# Patient Record
Sex: FEMALE | Race: BLACK OR AFRICAN AMERICAN | NOT HISPANIC OR LATINO | URBAN - METROPOLITAN AREA
[De-identification: names, ages, dates, MRNs, and addresses within clinical notes are randomized per-mention and may not be internally consistent; named-entity substitution may affect disease eponyms.]

---

## 2024-06-03 ENCOUNTER — INPATIENT (INPATIENT)
Facility: HOSPITAL | Age: 39
LOS: 7 days | Discharge: HOME | End: 2024-06-11
Attending: HOSPITALIST | Admitting: HOSPITALIST
Payer: MEDICAID

## 2024-06-03 VITALS
HEART RATE: 148 BPM | DIASTOLIC BLOOD PRESSURE: 60 MMHG | OXYGEN SATURATION: 99 % | TEMPERATURE: 100 F | RESPIRATION RATE: 10 BRPM | SYSTOLIC BLOOD PRESSURE: 100 MMHG

## 2024-06-03 DIAGNOSIS — E11.10 TYPE 2 DIABETES MELLITUS WITH KETOACIDOSIS WITHOUT COMA: ICD-10-CM

## 2024-06-03 LAB
ADD ON TEST-SPECIMEN IN LAB: SIGNIFICANT CHANGE UP
ALBUMIN SERPL ELPH-MCNC: 3.6 G/DL — SIGNIFICANT CHANGE UP (ref 3.3–5)
ALBUMIN SERPL ELPH-MCNC: 4.1 G/DL — SIGNIFICANT CHANGE UP (ref 3.3–5)
ALP SERPL-CCNC: 132 U/L — HIGH (ref 40–120)
ALP SERPL-CCNC: 150 U/L — HIGH (ref 40–120)
ALT FLD-CCNC: 10 U/L — SIGNIFICANT CHANGE UP (ref 4–33)
ALT FLD-CCNC: 7 U/L — SIGNIFICANT CHANGE UP (ref 4–33)
ANION GAP SERPL CALC-SCNC: 36 MMOL/L — HIGH (ref 7–14)
ANION GAP SERPL CALC-SCNC: 38 MMOL/L — HIGH (ref 7–14)
ANION GAP SERPL CALC-SCNC: 40 MMOL/L — HIGH (ref 7–14)
APPEARANCE UR: CLEAR — SIGNIFICANT CHANGE UP
APPEARANCE UR: CLEAR — SIGNIFICANT CHANGE UP
APTT BLD: 25.2 SEC — SIGNIFICANT CHANGE UP (ref 24.5–35.6)
AST SERPL-CCNC: 10 U/L — SIGNIFICANT CHANGE UP (ref 4–32)
AST SERPL-CCNC: 21 U/L — SIGNIFICANT CHANGE UP (ref 4–32)
B-OH-BUTYR SERPL-SCNC: >9 MMOL/L — HIGH (ref 0–0.4)
BASOPHILS # BLD AUTO: 0.02 K/UL — SIGNIFICANT CHANGE UP (ref 0–0.2)
BASOPHILS # BLD AUTO: 0.02 K/UL — SIGNIFICANT CHANGE UP (ref 0–0.2)
BASOPHILS NFR BLD AUTO: 0.1 % — SIGNIFICANT CHANGE UP (ref 0–2)
BASOPHILS NFR BLD AUTO: 0.1 % — SIGNIFICANT CHANGE UP (ref 0–2)
BILIRUB SERPL-MCNC: 0.2 MG/DL — SIGNIFICANT CHANGE UP (ref 0.2–1.2)
BILIRUB SERPL-MCNC: 0.3 MG/DL — SIGNIFICANT CHANGE UP (ref 0.2–1.2)
BILIRUB UR-MCNC: NEGATIVE — SIGNIFICANT CHANGE UP
BILIRUB UR-MCNC: NEGATIVE — SIGNIFICANT CHANGE UP
BUN SERPL-MCNC: 67 MG/DL — HIGH (ref 7–23)
BUN SERPL-MCNC: 69 MG/DL — HIGH (ref 7–23)
BUN SERPL-MCNC: 74 MG/DL — HIGH (ref 7–23)
CALCIUM SERPL-MCNC: 9.2 MG/DL — SIGNIFICANT CHANGE UP (ref 8.4–10.5)
CALCIUM SERPL-MCNC: 9.3 MG/DL — SIGNIFICANT CHANGE UP (ref 8.4–10.5)
CALCIUM SERPL-MCNC: 9.9 MG/DL — SIGNIFICANT CHANGE UP (ref 8.4–10.5)
CHLORIDE SERPL-SCNC: 105 MMOL/L — SIGNIFICANT CHANGE UP (ref 98–107)
CHLORIDE SERPL-SCNC: 90 MMOL/L — LOW (ref 98–107)
CHLORIDE SERPL-SCNC: 99 MMOL/L — SIGNIFICANT CHANGE UP (ref 98–107)
CO2 SERPL-SCNC: 10 MMOL/L — CRITICAL LOW (ref 22–31)
CO2 SERPL-SCNC: 8 MMOL/L — CRITICAL LOW (ref 22–31)
CO2 SERPL-SCNC: 9 MMOL/L — CRITICAL LOW (ref 22–31)
COLOR SPEC: YELLOW — SIGNIFICANT CHANGE UP
COLOR SPEC: YELLOW — SIGNIFICANT CHANGE UP
CREAT ?TM UR-MCNC: 25 MG/DL — SIGNIFICANT CHANGE UP
CREAT SERPL-MCNC: 3.75 MG/DL — HIGH (ref 0.5–1.3)
CREAT SERPL-MCNC: 3.92 MG/DL — HIGH (ref 0.5–1.3)
CREAT SERPL-MCNC: 4.57 MG/DL — HIGH (ref 0.5–1.3)
DIFF PNL FLD: ABNORMAL
DIFF PNL FLD: ABNORMAL
EGFR: 12 ML/MIN/1.73M2 — LOW
EGFR: 14 ML/MIN/1.73M2 — LOW
EGFR: 15 ML/MIN/1.73M2 — LOW
EOSINOPHIL # BLD AUTO: 0 K/UL — SIGNIFICANT CHANGE UP (ref 0–0.5)
EOSINOPHIL # BLD AUTO: 0 K/UL — SIGNIFICANT CHANGE UP (ref 0–0.5)
EOSINOPHIL NFR BLD AUTO: 0 % — SIGNIFICANT CHANGE UP (ref 0–6)
EOSINOPHIL NFR BLD AUTO: 0 % — SIGNIFICANT CHANGE UP (ref 0–6)
FLUAV AG NPH QL: SIGNIFICANT CHANGE UP
FLUBV AG NPH QL: SIGNIFICANT CHANGE UP
GAS PNL BLDV: SIGNIFICANT CHANGE UP
GAS PNL BLDV: SIGNIFICANT CHANGE UP
GLUCOSE BLDC GLUCOMTR-MCNC: >600 MG/DL — CRITICAL HIGH (ref 70–99)
GLUCOSE SERPL-MCNC: 1011 MG/DL — CRITICAL HIGH (ref 70–99)
GLUCOSE SERPL-MCNC: 1202 MG/DL — CRITICAL HIGH (ref 70–99)
GLUCOSE SERPL-MCNC: >1500 MG/DL — CRITICAL HIGH (ref 70–99)
GLUCOSE UR QL: >=1000 MG/DL
GLUCOSE UR QL: >=1000 MG/DL
HCG SERPL-ACNC: <1 MIU/ML — SIGNIFICANT CHANGE UP
HCT VFR BLD CALC: 33.1 % — LOW (ref 34.5–45)
HCT VFR BLD CALC: 35.1 % — SIGNIFICANT CHANGE UP (ref 34.5–45)
HCT VFR BLD CALC: 40.1 % — SIGNIFICANT CHANGE UP (ref 34.5–45)
HGB BLD-MCNC: 10.2 G/DL — LOW (ref 11.5–15.5)
HGB BLD-MCNC: 11.2 G/DL — LOW (ref 11.5–15.5)
HGB BLD-MCNC: 9.9 G/DL — LOW (ref 11.5–15.5)
IANC: 14.44 K/UL — HIGH (ref 1.8–7.4)
IANC: 16.63 K/UL — HIGH (ref 1.8–7.4)
IMM GRANULOCYTES NFR BLD AUTO: 0.6 % — SIGNIFICANT CHANGE UP (ref 0–0.9)
IMM GRANULOCYTES NFR BLD AUTO: 0.8 % — SIGNIFICANT CHANGE UP (ref 0–0.9)
INR BLD: 1.06 RATIO — SIGNIFICANT CHANGE UP (ref 0.85–1.18)
KETONES UR-MCNC: 40 MG/DL
KETONES UR-MCNC: 40 MG/DL
LEUKOCYTE ESTERASE UR-ACNC: NEGATIVE — SIGNIFICANT CHANGE UP
LEUKOCYTE ESTERASE UR-ACNC: NEGATIVE — SIGNIFICANT CHANGE UP
LYMPHOCYTES # BLD AUTO: 0.32 K/UL — LOW (ref 1–3.3)
LYMPHOCYTES # BLD AUTO: 0.76 K/UL — LOW (ref 1–3.3)
LYMPHOCYTES # BLD AUTO: 1.9 % — LOW (ref 13–44)
LYMPHOCYTES # BLD AUTO: 4 % — LOW (ref 13–44)
MAGNESIUM SERPL-MCNC: 3.3 MG/DL — HIGH (ref 1.6–2.6)
MCHC RBC-ENTMCNC: 23 PG — LOW (ref 27–34)
MCHC RBC-ENTMCNC: 23.4 PG — LOW (ref 27–34)
MCHC RBC-ENTMCNC: 23.9 PG — LOW (ref 27–34)
MCHC RBC-ENTMCNC: 27.9 GM/DL — LOW (ref 32–36)
MCHC RBC-ENTMCNC: 28.2 GM/DL — LOW (ref 32–36)
MCHC RBC-ENTMCNC: 30.8 GM/DL — LOW (ref 32–36)
MCV RBC AUTO: 75.9 FL — LOW (ref 80–100)
MCV RBC AUTO: 81.4 FL — SIGNIFICANT CHANGE UP (ref 80–100)
MCV RBC AUTO: 85.5 FL — SIGNIFICANT CHANGE UP (ref 80–100)
MONOCYTES # BLD AUTO: 1.52 K/UL — HIGH (ref 0–0.9)
MONOCYTES # BLD AUTO: 1.98 K/UL — HIGH (ref 0–0.9)
MONOCYTES NFR BLD AUTO: 11.7 % — SIGNIFICANT CHANGE UP (ref 2–14)
MONOCYTES NFR BLD AUTO: 8 % — SIGNIFICANT CHANGE UP (ref 2–14)
NEUTROPHILS # BLD AUTO: 14.44 K/UL — HIGH (ref 1.8–7.4)
NEUTROPHILS # BLD AUTO: 16.63 K/UL — HIGH (ref 1.8–7.4)
NEUTROPHILS NFR BLD AUTO: 85.5 % — HIGH (ref 43–77)
NEUTROPHILS NFR BLD AUTO: 87.3 % — HIGH (ref 43–77)
NITRITE UR-MCNC: NEGATIVE — SIGNIFICANT CHANGE UP
NITRITE UR-MCNC: NEGATIVE — SIGNIFICANT CHANGE UP
NRBC # BLD: 0 /100 WBCS — SIGNIFICANT CHANGE UP (ref 0–0)
NRBC # FLD: 0 K/UL — SIGNIFICANT CHANGE UP (ref 0–0)
NRBC # FLD: 0 K/UL — SIGNIFICANT CHANGE UP (ref 0–0)
NRBC # FLD: 0.02 K/UL — HIGH (ref 0–0)
PH UR: 5.5 — SIGNIFICANT CHANGE UP (ref 5–8)
PH UR: 5.5 — SIGNIFICANT CHANGE UP (ref 5–8)
PHOSPHATE SERPL-MCNC: 1.3 MG/DL — LOW (ref 2.5–4.5)
PLATELET # BLD AUTO: 391 K/UL — SIGNIFICANT CHANGE UP (ref 150–400)
PLATELET # BLD AUTO: 427 K/UL — HIGH (ref 150–400)
PLATELET # BLD AUTO: 509 K/UL — HIGH (ref 150–400)
POTASSIUM SERPL-MCNC: 3.3 MMOL/L — LOW (ref 3.5–5.3)
POTASSIUM SERPL-MCNC: 3.4 MMOL/L — LOW (ref 3.5–5.3)
POTASSIUM SERPL-MCNC: 3.6 MMOL/L — SIGNIFICANT CHANGE UP (ref 3.5–5.3)
POTASSIUM SERPL-SCNC: 3.3 MMOL/L — LOW (ref 3.5–5.3)
POTASSIUM SERPL-SCNC: 3.4 MMOL/L — LOW (ref 3.5–5.3)
POTASSIUM SERPL-SCNC: 3.6 MMOL/L — SIGNIFICANT CHANGE UP (ref 3.5–5.3)
PROT SERPL-MCNC: 8 G/DL — SIGNIFICANT CHANGE UP (ref 6–8.3)
PROT SERPL-MCNC: 8.7 G/DL — HIGH (ref 6–8.3)
PROT UR-MCNC: 100 MG/DL
PROT UR-MCNC: 100 MG/DL
PROTHROM AB SERPL-ACNC: 12 SEC — SIGNIFICANT CHANGE UP (ref 9.5–13)
RBC # BLD: 4.31 M/UL — SIGNIFICANT CHANGE UP (ref 3.8–5.2)
RBC # BLD: 4.36 M/UL — SIGNIFICANT CHANGE UP (ref 3.8–5.2)
RBC # BLD: 4.69 M/UL — SIGNIFICANT CHANGE UP (ref 3.8–5.2)
RBC # FLD: 17.3 % — HIGH (ref 10.3–14.5)
RBC # FLD: 17.8 % — HIGH (ref 10.3–14.5)
RBC # FLD: 18.2 % — HIGH (ref 10.3–14.5)
RSV RNA NPH QL NAA+NON-PROBE: SIGNIFICANT CHANGE UP
SARS-COV-2 RNA SPEC QL NAA+PROBE: SIGNIFICANT CHANGE UP
SODIUM SERPL-SCNC: 140 MMOL/L — SIGNIFICANT CHANGE UP (ref 135–145)
SODIUM SERPL-SCNC: 146 MMOL/L — HIGH (ref 135–145)
SODIUM SERPL-SCNC: 149 MMOL/L — HIGH (ref 135–145)
SODIUM UR-SCNC: 54 MMOL/L — SIGNIFICANT CHANGE UP
SP GR SPEC: 1.02 — SIGNIFICANT CHANGE UP (ref 1–1.03)
SP GR SPEC: 1.02 — SIGNIFICANT CHANGE UP (ref 1–1.03)
UROBILINOGEN FLD QL: 0.2 MG/DL — SIGNIFICANT CHANGE UP (ref 0.2–1)
UROBILINOGEN FLD QL: 0.2 MG/DL — SIGNIFICANT CHANGE UP (ref 0.2–1)
WBC # BLD: 16.9 K/UL — HIGH (ref 3.8–10.5)
WBC # BLD: 19.05 K/UL — HIGH (ref 3.8–10.5)
WBC # BLD: 20.07 K/UL — HIGH (ref 3.8–10.5)
WBC # FLD AUTO: 16.9 K/UL — HIGH (ref 3.8–10.5)
WBC # FLD AUTO: 19.05 K/UL — HIGH (ref 3.8–10.5)
WBC # FLD AUTO: 20.07 K/UL — HIGH (ref 3.8–10.5)

## 2024-06-03 PROCEDURE — 99291 CRITICAL CARE FIRST HOUR: CPT | Mod: GC

## 2024-06-03 PROCEDURE — 99291 CRITICAL CARE FIRST HOUR: CPT

## 2024-06-03 PROCEDURE — 71045 X-RAY EXAM CHEST 1 VIEW: CPT | Mod: 26

## 2024-06-03 RX ORDER — DEXTROSE MONOHYDRATE, SODIUM CHLORIDE, AND POTASSIUM CHLORIDE 50; .745; 4.5 G/1000ML; G/1000ML; G/1000ML
1000 INJECTION, SOLUTION INTRAVENOUS
Refills: 0 | Status: DISCONTINUED | OUTPATIENT
Start: 2024-06-03 | End: 2024-06-03

## 2024-06-03 RX ORDER — INSULIN HUMAN 100 [IU]/ML
7 INJECTION, SOLUTION SUBCUTANEOUS
Qty: 100 | Refills: 0 | Status: DISCONTINUED | OUTPATIENT
Start: 2024-06-03 | End: 2024-06-06

## 2024-06-03 RX ORDER — CALCIUM GLUCONATE 100 MG/ML
2 VIAL (ML) INTRAVENOUS ONCE
Refills: 0 | Status: COMPLETED | OUTPATIENT
Start: 2024-06-03 | End: 2024-06-03

## 2024-06-03 RX ORDER — POTASSIUM PHOSPHATE, MONOBASIC POTASSIUM PHOSPHATE, DIBASIC 236; 224 MG/ML; MG/ML
30 INJECTION, SOLUTION INTRAVENOUS ONCE
Refills: 0 | Status: COMPLETED | OUTPATIENT
Start: 2024-06-03 | End: 2024-06-04

## 2024-06-03 RX ORDER — PIPERACILLIN AND TAZOBACTAM 4; .5 G/20ML; G/20ML
3.38 INJECTION, POWDER, LYOPHILIZED, FOR SOLUTION INTRAVENOUS EVERY 12 HOURS
Refills: 0 | Status: DISCONTINUED | OUTPATIENT
Start: 2024-06-04 | End: 2024-06-04

## 2024-06-03 RX ORDER — INSULIN HUMAN 100 [IU]/ML
0.8 INJECTION, SOLUTION SUBCUTANEOUS
Qty: 100 | Refills: 0 | Status: DISCONTINUED | OUTPATIENT
Start: 2024-06-03 | End: 2024-06-03

## 2024-06-03 RX ORDER — SODIUM CHLORIDE 9 MG/ML
1000 INJECTION INTRAMUSCULAR; INTRAVENOUS; SUBCUTANEOUS ONCE
Refills: 0 | Status: COMPLETED | OUTPATIENT
Start: 2024-06-03 | End: 2024-06-03

## 2024-06-03 RX ORDER — VANCOMYCIN HCL 1 G
1000 VIAL (EA) INTRAVENOUS ONCE
Refills: 0 | Status: COMPLETED | OUTPATIENT
Start: 2024-06-03 | End: 2024-06-03

## 2024-06-03 RX ORDER — HEPARIN SODIUM 5000 [USP'U]/ML
5000 INJECTION INTRAVENOUS; SUBCUTANEOUS EVERY 8 HOURS
Refills: 0 | Status: DISCONTINUED | OUTPATIENT
Start: 2024-06-03 | End: 2024-06-11

## 2024-06-03 RX ORDER — SODIUM CHLORIDE 9 MG/ML
2000 INJECTION INTRAMUSCULAR; INTRAVENOUS; SUBCUTANEOUS ONCE
Refills: 0 | Status: COMPLETED | OUTPATIENT
Start: 2024-06-03 | End: 2024-06-03

## 2024-06-03 RX ORDER — PIPERACILLIN AND TAZOBACTAM 4; .5 G/20ML; G/20ML
3.38 INJECTION, POWDER, LYOPHILIZED, FOR SOLUTION INTRAVENOUS ONCE
Refills: 0 | Status: COMPLETED | OUTPATIENT
Start: 2024-06-03 | End: 2024-06-03

## 2024-06-03 RX ORDER — SODIUM CHLORIDE 9 MG/ML
1000 INJECTION, SOLUTION INTRAVENOUS
Refills: 0 | Status: DISCONTINUED | OUTPATIENT
Start: 2024-06-03 | End: 2024-06-03

## 2024-06-03 RX ORDER — ACETAMINOPHEN 500 MG
1000 TABLET ORAL ONCE
Refills: 0 | Status: COMPLETED | OUTPATIENT
Start: 2024-06-03 | End: 2024-06-03

## 2024-06-03 RX ORDER — SODIUM CHLORIDE 9 MG/ML
1000 INJECTION, SOLUTION INTRAVENOUS
Refills: 0 | Status: DISCONTINUED | OUTPATIENT
Start: 2024-06-03 | End: 2024-06-04

## 2024-06-03 RX ORDER — INSULIN HUMAN 100 [IU]/ML
68 INJECTION, SOLUTION SUBCUTANEOUS
Qty: 100 | Refills: 0 | Status: DISCONTINUED | OUTPATIENT
Start: 2024-06-03 | End: 2024-06-03

## 2024-06-03 RX ORDER — POTASSIUM CHLORIDE 20 MEQ
10 PACKET (EA) ORAL ONCE
Refills: 0 | Status: DISCONTINUED | OUTPATIENT
Start: 2024-06-03 | End: 2024-06-03

## 2024-06-03 RX ADMIN — SODIUM CHLORIDE 1000 MILLILITER(S): 9 INJECTION INTRAMUSCULAR; INTRAVENOUS; SUBCUTANEOUS at 17:00

## 2024-06-03 RX ADMIN — SODIUM CHLORIDE 250 MILLILITER(S): 9 INJECTION, SOLUTION INTRAVENOUS at 19:00

## 2024-06-03 RX ADMIN — SODIUM CHLORIDE 250 MILLILITER(S): 9 INJECTION, SOLUTION INTRAVENOUS at 19:18

## 2024-06-03 RX ADMIN — SODIUM CHLORIDE 2000 MILLILITER(S): 9 INJECTION INTRAMUSCULAR; INTRAVENOUS; SUBCUTANEOUS at 16:50

## 2024-06-03 RX ADMIN — INSULIN HUMAN 8 UNIT(S)/HR: 100 INJECTION, SOLUTION SUBCUTANEOUS at 18:48

## 2024-06-03 RX ADMIN — Medication 400 MILLIGRAM(S): at 17:10

## 2024-06-03 RX ADMIN — PIPERACILLIN AND TAZOBACTAM 200 GRAM(S): 4; .5 INJECTION, POWDER, LYOPHILIZED, FOR SOLUTION INTRAVENOUS at 16:59

## 2024-06-03 RX ADMIN — HEPARIN SODIUM 5000 UNIT(S): 5000 INJECTION INTRAVENOUS; SUBCUTANEOUS at 23:44

## 2024-06-03 RX ADMIN — Medication 200 GRAM(S): at 16:50

## 2024-06-03 RX ADMIN — Medication 250 MILLIGRAM(S): at 18:36

## 2024-06-03 NOTE — ED ADULT NURSE REASSESSMENT NOTE - NS ED NURSE REASSESS COMMENT FT1
report received from day shift ED RN. pt in stretcher, + tachycardiac and tachypnea noted. maintained on cardiac monitor,  bpm, sinus tach. maintained on 2L NC, o2 sat 99% with supplemental o2. iv meds infusing well without complications, iv sites WNL. safety measures maintained, side rails up x2. awaiting MICU bed placement report received from day shift ED RN. pt in stretcher, nonverbal at this time, unable to follow commands. pt lethargic but eyes open spontaneously. + tachycardiac and tachypnea noted. maintained on cardiac monitor,  bpm, sinus tach. maintained on 2L NC, o2 sat 99% with supplemental o2. iv meds infusing well without complications, iv sites WNL. safety measures maintained, side rails up x2. awaiting MICU bed placement

## 2024-06-03 NOTE — ED ADULT NURSE REASSESSMENT NOTE - NS ED NURSE REASSESS COMMENT FT1
Break RN: Patient received in 25 as hyperglycemia notification; facilitator RN Justino and MD Kingston at bedside. Will provide care as needed.

## 2024-06-03 NOTE — PATIENT PROFILE ADULT - HEALTH LITERACY
We believe that your chronic cough is secondary to your congestive heart failure. We have performed an echo (ultrasound of your heart) here as well as ordered the necessary labs for your cardiology visit. You have an appointment in the cardiology clinic at 8:40 am with Dr. Mathew.    We have given you medicine here to help you void the fluid from your lungs that was causing the cough and shortness of breath. We would recommend continuing with the same medication in oral form, furosemide 40 mg, to be taken once a day. Please follow-up with your cardiologist in regards to titrating up or weaning down this medication.    One of your medications we have stopped, the lisinopril, which is a blood pressure medication but has a noted side effect of dry cough. We have reason to believe it may have contributed to your symptoms. In the future, you may be placed on another medication called losartan.     We have sent these medications as well as some cough medication or you to take, when needed, to your pharmacy ([facility] in [place], [place])    
no

## 2024-06-03 NOTE — PATIENT PROFILE ADULT - FALL HARM RISK - RISK INTERVENTIONS

## 2024-06-03 NOTE — ED ADULT NURSE REASSESSMENT NOTE - NS ED NURSE REASSESS COMMENT FT1
delayed report for MICU bed d/t cleaning, ANM made aware. report given to Champ MICU RN, all questions answered. charge RN made aware. awaiting mobile critical care RN for transport to MICU. 1500 cc of urine drained from lloyd bag. vitals as documented. iv meds infusing well without complications, iv sites WNL. side rails up x2.

## 2024-06-03 NOTE — ED ADULT NURSE REASSESSMENT NOTE - NS ED NURSE REASSESS COMMENT FT1
Float RN note- 20g IV placed to left wrist. Labs sent as ordered. Patient medicated as ordered. Safety maintained. Patient stable upon exiting the room.

## 2024-06-03 NOTE — ED PROVIDER NOTE - PROGRESS NOTE DETAILS
Kevan PGY2: Brother stating the patient is A&Ox4 at baseline.  She recently had a URI and brother found her on the floor.    Brother (Myriam): [phone]   (Stoney): [phone]    Patient has improving mental status on repeat evaluation. Admitted to MICU.

## 2024-06-03 NOTE — H&P ADULT - NSHPPHYSICALEXAM_GEN_ALL_CORE
VITALS:   T(C): 38 (06-03-24 @ 16:15), Max: 38 (06-03-24 @ 16:15)  HR: 138 (06-03-24 @ 16:45) (138 - 148)  BP: 127/82 (06-03-24 @ 16:45) (100/60 - 127/82)  RR: 18 (06-03-24 @ 16:45) (10 - 18)  SpO2: 98% (06-03-24 @ 16:45) (98% - 99%)    GENERAL: NAD, lying in bed comfortably  HEAD:  Atraumatic, Normocephalic  EYES: conjunctiva and sclera clear  ENT: Moist mucous membranes  NECK: Supple, No JVD  CHEST/LUNG: Clear to auscultation bilaterally; No rales, rhonchi, wheezing, or rubs. Unlabored respirations  HEART: Regular rate and rhythm; No murmurs, rubs, or gallops  ABDOMEN: BSx4; Soft, nontender, nondistended  EXTREMITIES:   No clubbing, cyanosis, or edema  NERVOUS SYSTEM:  A&Ox3, no focal deficits   SKIN: No rashes or lesions  Psych: Normal speech, normal behavior, normal affect VITALS:   T(C): 38 (06-03-24 @ 16:15), Max: 38 (06-03-24 @ 16:15)  HR: 138 (06-03-24 @ 16:45) (138 - 148)  BP: 127/82 (06-03-24 @ 16:45) (100/60 - 127/82)  RR: 18 (06-03-24 @ 16:45) (10 - 18)  SpO2: 98% (06-03-24 @ 16:45) (98% - 99%)    GENERAL: obese   HEAD:  Atraumatic, Normocephalic  EYES: conjunctiva and sclera clear  ENT: poor dentition   NECK: Supple, No JVD  CHEST/LUNG: Clear to auscultation bilaterally; No rales, rhonchi, wheezing, or rubs. Unlabored respirations  HEART: Regular rate and rhythm; No murmurs, rubs, or gallops  ABDOMEN: BSx4; Soft, nontender, nondistended  EXTREMITIES:  No clubbing, cyanosis, or edema  NERVOUS SYSTEM:  A&Ox0, pupils reaction, unable to follow commands, clenching of hands, not purposeful   SKIN: No rashes or lesions  Psych: Normal speech, normal behavior, normal affect

## 2024-06-03 NOTE — ED PROVIDER NOTE - ATTENDING CONTRIBUTION TO CARE
40 yo F w/ PMHx of DM presents via EMS after being found unresponsive with emesis on face in a hot room and with fingerstick reporting HI.  EMS gave patient 1 L of fluids and started her on a nonrebreather prior to bringing her to the ED.  EMS rhythm strip appeared to have peaked T waves.    In the ED, patient was barely responding to sternal rub.  She was on nonrebreather with bilateral breath sounds and warm to touch with bounding pulses. Pupils were equal and reactive to light.  Patient was exposed with no identifiable injury/contusion or wounds.    She was satting well on nonrebreather and was weaned down to 6 L NC w/ 100% SpO2.  Vital signs were remarkable for tachycardia (130s), tachypnea (30s), fever (100.9 F).  In addition to the 1 L fluids given by EMS, patient will receive 2 L of saline, 2 g calcium gluconate, vanc/Zosyn, and will order sepsis order set with beta-hydroxybutyrate.  Concern for DKA and sepsis from unknown etiology.  GlideScope and additional airway equipment set up at bedside. Will defer intubation at this time as patient is satting at 100% and has improving mental status.

## 2024-06-03 NOTE — H&P ADULT - HISTORY OF PRESENT ILLNESS
In the ED, T 100.4, , /60, RR 10, 99% on NRB -> weaned to 6L NC. Labs significant for WBC 16.9, AG 40, Cr 4.57, glucose >1500, BHB >9, pH 7.11, U/A w/ 40 ketones. s/p  In the ED, T 100.4, , /60, RR 10, 99% on NRB -> weaned to 6L NC. Labs significant for WBC 16.9, AG 40, Cr 4.57, glucose >1500, BHB >9, pH 7.11, U/A w/ 40 ketones. s/p vanc 1g x1, zosyn x1, 3L NS bolus, calcium gluconate 2g x1. MICU consulted for DKA mgmt.       In the ED, T 100.4, , /60, RR 10, 99% on NRB -> weaned to 6L NC. Labs significant for WBC 16.9, AG 40, Cr 4.57, glucose >1500, BHB >9, pH 7.11, U/A w/ 40 ketones. s/p vanc 1g x1, zosyn x1, 3L NS bolus, calcium gluconate 2g x1. MICU consulted for DKA mgmt.   This is a 40 y/o F with PMHx of DM who presented from EMS after found unresponsive w/ emesis on face. Patient unable to provide any history due to AMS. No family at bedside or contact info in chart to provide further collateral.     Per ED note, pt was given 1L of fluids, started on NRB. Patient barely responsive to sternal rub. Deferred intubation as patient satting 100%.     In the ED, T 100.4, , /60, RR 10, 99% on NRB -> weaned to 6L NC. Labs significant for WBC 16.9, AG 40, Cr 4.57, glucose >1500, BHB >9, pH 7.11, U/A w/ 40 ketones. s/p vanc 1g x1, zosyn x1, 3L NS bolus, calcium gluconate 2g x1. MICU consulted for DKA mgmt.

## 2024-06-03 NOTE — H&P ADULT - NSHPLABSRESULTS_GEN_ALL_CORE
11.2   16.90 )-----------( 509      ( 2024 16:30 )             40.1     -    140  |  90<L>  |  74<H>  ----------------------------<  >1500<HH>  3.6   |  10<LL>  |  4.57<H>    Ca    9.3      2024 16:30    TPro  8.7<H>  /  Alb  4.1  /  TBili  0.3  /  DBili  x   /  AST  10  /  ALT  7   /  AlkPhos  150<H>  06-        Urinalysis Basic - ( 2024 17:18 )    Color: Yellow / Appearance: Clear / S.023 / pH: x  Gluc: x / Ketone: 40 mg/dL  / Bili: Negative / Urobili: 0.2 mg/dL   Blood: x / Protein: 100 mg/dL / Nitrite: Negative   Leuk Esterase: Negative / RBC: 1 /HPF / WBC 12 /HPF   Sq Epi: x / Non Sq Epi: 9 /HPF / Bacteria: Occasional /HPF    PT/INR - ( 2024 16:30 )   PT: 12.0 sec;   INR: 1.06 ratio    PTT - ( 2024 16:30 )  PTT:25.2 sec    Lactate Trend    CAPILLARY BLOOD GLUCOSE  POCT Blood Glucose.: >600 mg/dL (2024 18:37)    < from: Xray Chest 1 View-PORTABLE IMMEDIATE (24 @ 17:24) >    FINDINGS:  Evaluation is limited by rotated film.  Heart/Vascular: Heart is normal in size.  Pulmonary: Low lung volumes. No focal consolidations. No pleural   effusion. No pneumothorax.  Bones: No acute bony abnormalities.  Lines and tubes: None.    IMPRESSION:  Limited evaluation.  No focal consolidations.    < end of copied text > 11.2   16.90 )-----------( 509      ( 2024 16:30 )              40.1     -    140  |  90<L>  |  74<H>  ----------------------------<  >1500<HH>  3.6   |  10<LL>  |  4.57<H>    Ca    9.3      2024 16:30    TPro  8.7<H>  /  Alb  4.1  /  TBili  0.3  /  DBili  x   /  AST  10  /  ALT  7   /  AlkPhos  150<H>  06-        Urinalysis Basic - ( 2024 17:18 )    Color: Yellow / Appearance: Clear / S.023 / pH: x  Gluc: x / Ketone: 40 mg/dL  / Bili: Negative / Urobili: 0.2 mg/dL   Blood: x / Protein: 100 mg/dL / Nitrite: Negative   Leuk Esterase: Negative / RBC: 1 /HPF / WBC 12 /HPF   Sq Epi: x / Non Sq Epi: 9 /HPF / Bacteria: Occasional /HPF    PT/INR - ( 2024 16:30 )   PT: 12.0 sec;   INR: 1.06 ratio    PTT - ( 2024 16:30 )  PTT:25.2 sec    Lactate Trend    CAPILLARY BLOOD GLUCOSE  POCT Blood Glucose.: >600 mg/dL (2024 18:37)    < from: Xray Chest 1 View-PORTABLE IMMEDIATE (24 @ 17:24) >    FINDINGS:  Evaluation is limited by rotated film.  Heart/Vascular: Heart is normal in size.  Pulmonary: Low lung volumes. No focal consolidations. No pleural   effusion. No pneumothorax.  Bones: No acute bony abnormalities.  Lines and tubes: None.    IMPRESSION:  Limited evaluation.  No focal consolidations.    < end of copied text >

## 2024-06-03 NOTE — ED ADULT NURSE NOTE - NSFALLRISKINTERV_ED_ALL_ED

## 2024-06-03 NOTE — H&P ADULT - ASSESSMENT
This is a 38 y/o F with PMHx     Plan :    Neuro:  This is a 40 y/o F with PMHx who was found down at home w/ nausea on face, admitted to MICU for DKA mgmt.     Plan :    Neuro:   #Metabolic Encephalopathy   - likely in setting of DKA vs sepsis  - baseline A&Ox     CV:   - no active issues at this time     PULM:  #AHRF     GI:   -     RENAL:   #OSMAN   - unclear baseline  - strict I's and O's  - avoid nephrotoxic agents  - renally dose meds     ENDO:  #DKA  #DM  - FS q1h  - A1c  - insulin gtt, titrate per hospital protocol  - start D5 when glucose <200   - BMP, VBG q4h       ID:  #sepsis   - unclear etiology   - f/u urine culture, blood cx   - trend WBC, fever curve     HEME/ONC:  #DVT ppx: SQH This is a 38 y/o F with PMHx who was found down at home w/ nausea on face, admitted to MICU for DKA mgmt.     =======NEURO=========  #Encephalopathy  - likely metabolic 2/2 DKA, also possibly in setting of infection  - reassess when blood glucose and volume status improved    =======CARDIOVASCULAR=====  - no active issues at this time    =======RESPIRATORY=========  #AHRF  - 2/2 to possible aspiration event   - CXR limited view but clear lungs   - wean o2 as tolerated     =======GASTROINTESTINAL====      =======GENITOURINARY=======  #OSMAN  - Admission Cr 4.57, unclear baseline   - given fluid boluses in ED  - Possibly pre-renal ISO volume depletion and DKA   - ctm Cr  - avoid nephrotoxic agents  - renally dose meds  - strict I's and O's    =======ID===================  #Sepsis   - WBC 16.9, T 100.4,    - U/A negative, limited CXR w/o focal consolidation, RVP negative   - continue zosyn for now (6/3- )   - f/u urine cx, blood culture     =======HEME================  #Normocytic anemia  - no active bleed  - monitor CBC    =======ENDOCRINE============  #DKA  - A1c 8.2 in March; now elevated > 10  - unclear etiology, possibly 2/2 to infection?   - Started on insulin drip at 8 units per hour, adjust per hospital protocol  - D5 when glucose <200  - NPO   - BMP/VBG q4 to monitor anion gap/acidosis    =======PROPHYLAXIS===========  -Access: PIV  -Code Status: Full  -Disposition: pending clinical course This is a 40 y/o F with PMHx who was found down at home w/ nausea on face, admitted to MICU for DKA mgmt.     =======NEURO=========  #Encephalopathy  - likely metabolic 2/2 DKA, also possibly in setting of infection  - reassess when blood glucose and volume status improved    =======CARDIOVASCULAR=====  #Tachycardia  - likely in setting of DKA vs active infection vs dehydration   - EKG    =======RESPIRATORY=========  #AHRF  - 2/2 to possible aspiration event   - CXR limited view but clear lungs  - will need more info once mental status improves   - wean o2 as tolerated     =======GASTROINTESTINAL====  - NPO     =======GENITOURINARY=======  #OSMAN  - Admission Cr 4.57, unclear baseline   - given fluid boluses in ED  - Possibly pre-renal ISO volume depletion and DKA   - urine lytes   - ctm Cr  - avoid nephrotoxic agents  - renally dose meds  - strict I's and O's    =======ID===================  #Sepsis   - WBC 16.9, T 100.4,    - U/A negative, limited CXR w/o focal consolidation, RVP negative   - s/p vanc/zosyn in ED   - continue vanc, zosyn for now (6/3- )   - f/u urine cx, blood culture     =======HEME================  #Normocytic anemia  - no active bleed  - monitor CBC    =======ENDOCRINE============  #DKA  - per EMS, hx of DM (unclear 1 vs 2)   - A1c 8.2 in March; now elevated > 10  - unclear etiology, will need more collateral   - Started on insulin drip at 8 units per hour, adjust per hospital protocol  - D5 when glucose <200  - NPO   - BMP/VBG q4 to monitor anion gap/acidosis    =======PROPHYLAXIS===========  -Access: PIV  -Code Status: Full  -Disposition: pending clinical course This is a 40 y/o F with PMHx who was found down at home w/ nausea on face, admitted to MICU for DKA mgmt.     =======NEURO=========  #Encephalopathy  - likely metabolic 2/2 DKA, also possibly in setting of infection  - reassess when blood glucose and volume status improved    =======CARDIOVASCULAR=====  #Tachycardia  - likely in setting of DKA vs active infection vs dehydration   - EKG    =======RESPIRATORY=========  #AHRF  - 2/2 to possible aspiration event   - CXR limited view but clear lungs  - will need more info once mental status improves   - wean o2 as tolerated     =======GASTROINTESTINAL====  - NPO     =======GENITOURINARY=======  #OSMAN  - Admission Cr 4.57, unclear baseline   - given fluid boluses in ED  - Possibly pre-renal ISO volume depletion and DKA   - urine lytes   - ctm Cr  - avoid nephrotoxic agents  - renally dose meds  - strict I's and O's    =======ID===================  #Sepsis   - WBC 16.9, T 100.4,    - U/A negative, limited CXR w/o focal consolidation, RVP negative   - s/p vanc/zosyn in ED   - continue vanc, zosyn for now (6/3- )   - f/u urine cx, blood culture     =======HEME================  #Normocytic anemia  - no active bleed  - monitor CBC    =======ENDOCRINE============  #DKA  - per EMS, hx of DM (unclear 1 vs 2)   - unclear etiology, will need more collateral   - A1c  - Started on insulin drip at 8 units per hour, adjust per hospital protocol  - D5 when glucose <200  - NPO   - BMP/VBG q4 to monitor anion gap/acidosis, replete K as needed     =======PROPHYLAXIS===========  -Access: PIV  -Code Status: Full  -Disposition: pending clinical course

## 2024-06-03 NOTE — ED PROVIDER NOTE - CLINICAL SUMMARY MEDICAL DECISION MAKING FREE TEXT BOX
40 yo F w/ PMHx of DM presents via EMS after being found unresponsive with emesis on face in a hot room and with fingerstick reporting HI.  EMS gave patient 1 L of fluids and started her on a nonrebreather prior to bringing her to the ED.  EMS rhythm strip appeared to have peaked T waves.    In the ED, patient was barely responding to sternal rub.  She was on nonrebreather with bilateral breath sounds and warm to touch with bounding pulses. Pupils were equal and reactive to light.  Patient was exposed with no identifiable injury/contusion or wounds.    She was satting well on nonrebreather and was weaned down to 6 L NC w/ 100% SpO2.  Vital signs were remarkable for tachycardia (130s), tachypnea (30s), fever (100.9 F).  In addition to the 1 L fluids given by EMS, patient will receive 2 L of saline, 2 g calcium gluconate, bank/Zosyn, and will order sepsis order set.  Concern for DKA and sepsis from unknown etiology.  GlideScope and additional airway equipment set up at bedside. Will defer intubation at this time as patient is satting at 100% and has improving mental status. 40 yo F w/ PMHx of DM presents via EMS after being found unresponsive with emesis on face in a hot room and with fingerstick reporting HI.  EMS gave patient 1 L of fluids and started her on a nonrebreather prior to bringing her to the ED.  EMS rhythm strip appeared to have peaked T waves.    In the ED, patient was barely responding to sternal rub.  She was on nonrebreather with bilateral breath sounds and warm to touch with bounding pulses. Pupils were equal and reactive to light.  Patient was exposed with no identifiable injury/contusion or wounds.    She was satting well on nonrebreather and was weaned down to 6 L NC w/ 100% SpO2.  Vital signs were remarkable for tachycardia (130s), tachypnea (30s), fever (100.9 F).  In addition to the 1 L fluids given by EMS, patient will receive 2 L of saline, 2 g calcium gluconate, vanc/Zosyn, and will order sepsis order set with beta-hydroxybutyrate.  Concern for DKA and sepsis from unknown etiology.  GlideScope and additional airway equipment set up at bedside. Will defer intubation at this time as patient is satting at 100% and has improving mental status.

## 2024-06-03 NOTE — H&P ADULT - ATTENDING COMMENTS
pt is a 38 yo female with hx DM presents with episode of nausea, vomiting  change in mental status, found nonverbal by ems, Pt given one liter ivf by   EMS and two liters NS by ER , noted FS hi.  In the ER, pt nonverbal noted  glucose 1500 bicarb 10, started on insulind drip for Diabetic ketoacidosis.  asked to evaluate. PE bp 140/74 rr 30 heent dry mucosa, neck supple,  eyes open pupils reactive, lungs clear heart s1s2 abdomen obese  nontender, ext no edema    labs    wbc 17 hgb 11 hct 40  cl 90 bicarb 10             bun 74  cr 4.57  glucose >1500   bhb  9   anion gap 40          ph 7.11    A/p  38 yo female with hyperglycemia, metabolic acidosis, c/w DKA,  - continue LR at 250 cc/hr with 20 meq potassium  -insulin drip at 8 units /hr, check fs q 1 hrs  -check mg, phos, bhb q4 hrs  -panculture, blood, urine  -dvt prophylaxis  -endocrine evaluation  -monitor hemodynamically  -critically ill with dka and metabolic acidosis,

## 2024-06-04 DIAGNOSIS — E11.10 TYPE 2 DIABETES MELLITUS WITH KETOACIDOSIS WITHOUT COMA: ICD-10-CM

## 2024-06-04 DIAGNOSIS — I10 ESSENTIAL (PRIMARY) HYPERTENSION: ICD-10-CM

## 2024-06-04 DIAGNOSIS — E78.5 HYPERLIPIDEMIA, UNSPECIFIED: ICD-10-CM

## 2024-06-04 DIAGNOSIS — E10.9 TYPE 1 DIABETES MELLITUS WITHOUT COMPLICATIONS: ICD-10-CM

## 2024-06-04 LAB
A1C WITH ESTIMATED AVERAGE GLUCOSE RESULT: 16.2 % — HIGH (ref 4–5.6)
ADD ON TEST-SPECIMEN IN LAB: SIGNIFICANT CHANGE UP
ALBUMIN SERPL ELPH-MCNC: 3.2 G/DL — LOW (ref 3.3–5)
ALBUMIN SERPL ELPH-MCNC: 3.3 G/DL — SIGNIFICANT CHANGE UP (ref 3.3–5)
ALBUMIN SERPL ELPH-MCNC: 3.4 G/DL — SIGNIFICANT CHANGE UP (ref 3.3–5)
ALBUMIN SERPL ELPH-MCNC: 3.7 G/DL — SIGNIFICANT CHANGE UP (ref 3.3–5)
ALP SERPL-CCNC: 114 U/L — SIGNIFICANT CHANGE UP (ref 40–120)
ALP SERPL-CCNC: 115 U/L — SIGNIFICANT CHANGE UP (ref 40–120)
ALP SERPL-CCNC: 115 U/L — SIGNIFICANT CHANGE UP (ref 40–120)
ALP SERPL-CCNC: 122 U/L — HIGH (ref 40–120)
ALP SERPL-CCNC: 122 U/L — HIGH (ref 40–120)
ALP SERPL-CCNC: 129 U/L — HIGH (ref 40–120)
ALT FLD-CCNC: 12 U/L — SIGNIFICANT CHANGE UP (ref 4–33)
ALT FLD-CCNC: 15 U/L — SIGNIFICANT CHANGE UP (ref 4–33)
ALT FLD-CCNC: 15 U/L — SIGNIFICANT CHANGE UP (ref 4–33)
ALT FLD-CCNC: 16 U/L — SIGNIFICANT CHANGE UP (ref 4–33)
ALT FLD-CCNC: 18 U/L — SIGNIFICANT CHANGE UP (ref 4–33)
ALT FLD-CCNC: 18 U/L — SIGNIFICANT CHANGE UP (ref 4–33)
AMPHET UR-MCNC: NEGATIVE — SIGNIFICANT CHANGE UP
ANION GAP SERPL CALC-SCNC: 15 MMOL/L — HIGH (ref 7–14)
ANION GAP SERPL CALC-SCNC: 16 MMOL/L — HIGH (ref 7–14)
ANION GAP SERPL CALC-SCNC: 16 MMOL/L — HIGH (ref 7–14)
ANION GAP SERPL CALC-SCNC: 19 MMOL/L — HIGH (ref 7–14)
ANION GAP SERPL CALC-SCNC: 19 MMOL/L — HIGH (ref 7–14)
ANION GAP SERPL CALC-SCNC: 24 MMOL/L — HIGH (ref 7–14)
APAP SERPL-MCNC: <10 UG/ML — LOW (ref 15–25)
APTT BLD: 21.8 SEC — LOW (ref 24.5–35.6)
AST SERPL-CCNC: 23 U/L — SIGNIFICANT CHANGE UP (ref 4–32)
AST SERPL-CCNC: 45 U/L — HIGH (ref 4–32)
AST SERPL-CCNC: 46 U/L — HIGH (ref 4–32)
AST SERPL-CCNC: 51 U/L — HIGH (ref 4–32)
AST SERPL-CCNC: 54 U/L — HIGH (ref 4–32)
AST SERPL-CCNC: 56 U/L — HIGH (ref 4–32)
B-OH-BUTYR SERPL-SCNC: 1 MMOL/L — HIGH (ref 0–0.4)
B-OH-BUTYR SERPL-SCNC: 1.9 MMOL/L — HIGH (ref 0–0.4)
B-OH-BUTYR SERPL-SCNC: 2.1 MMOL/L — HIGH (ref 0–0.4)
BACTERIA # UR AUTO: ABNORMAL /HPF
BARBITURATES UR SCN-MCNC: NEGATIVE — SIGNIFICANT CHANGE UP
BASE EXCESS BLDV CALC-SCNC: -2.1 MMOL/L — LOW (ref -2–3)
BASE EXCESS BLDV CALC-SCNC: -6 MMOL/L — LOW (ref -2–3)
BASE EXCESS BLDV CALC-SCNC: 1 MMOL/L — SIGNIFICANT CHANGE UP (ref -2–3)
BASOPHILS # BLD AUTO: 0.02 K/UL — SIGNIFICANT CHANGE UP (ref 0–0.2)
BASOPHILS NFR BLD AUTO: 0.1 % — SIGNIFICANT CHANGE UP (ref 0–2)
BENZODIAZ UR-MCNC: NEGATIVE — SIGNIFICANT CHANGE UP
BILIRUB SERPL-MCNC: 0.2 MG/DL — SIGNIFICANT CHANGE UP (ref 0.2–1.2)
BILIRUB SERPL-MCNC: 0.3 MG/DL — SIGNIFICANT CHANGE UP (ref 0.2–1.2)
BILIRUB SERPL-MCNC: 0.4 MG/DL — SIGNIFICANT CHANGE UP (ref 0.2–1.2)
BILIRUB SERPL-MCNC: <0.2 MG/DL — SIGNIFICANT CHANGE UP (ref 0.2–1.2)
BLOOD GAS VENOUS COMPREHENSIVE RESULT: SIGNIFICANT CHANGE UP
BUN SERPL-MCNC: 33 MG/DL — HIGH (ref 7–23)
BUN SERPL-MCNC: 37 MG/DL — HIGH (ref 7–23)
BUN SERPL-MCNC: 41 MG/DL — HIGH (ref 7–23)
BUN SERPL-MCNC: 49 MG/DL — HIGH (ref 7–23)
BUN SERPL-MCNC: 56 MG/DL — HIGH (ref 7–23)
BUN SERPL-MCNC: 64 MG/DL — HIGH (ref 7–23)
CALCIUM SERPL-MCNC: 10.1 MG/DL — SIGNIFICANT CHANGE UP (ref 8.4–10.5)
CALCIUM SERPL-MCNC: 8.9 MG/DL — SIGNIFICANT CHANGE UP (ref 8.4–10.5)
CALCIUM SERPL-MCNC: 9 MG/DL — SIGNIFICANT CHANGE UP (ref 8.4–10.5)
CALCIUM SERPL-MCNC: 9.1 MG/DL — SIGNIFICANT CHANGE UP (ref 8.4–10.5)
CALCIUM SERPL-MCNC: 9.3 MG/DL — SIGNIFICANT CHANGE UP (ref 8.4–10.5)
CALCIUM SERPL-MCNC: 9.7 MG/DL — SIGNIFICANT CHANGE UP (ref 8.4–10.5)
CAST: 2 /LPF — SIGNIFICANT CHANGE UP (ref 0–4)
CHLORIDE BLDV-SCNC: 118 MMOL/L — HIGH (ref 96–108)
CHLORIDE BLDV-SCNC: 121 MMOL/L — HIGH (ref 96–108)
CHLORIDE BLDV-SCNC: 125 MMOL/L — HIGH (ref 96–108)
CHLORIDE SERPL-SCNC: 117 MMOL/L — HIGH (ref 98–107)
CHLORIDE SERPL-SCNC: 118 MMOL/L — HIGH (ref 98–107)
CHLORIDE SERPL-SCNC: 121 MMOL/L — HIGH (ref 98–107)
CHLORIDE SERPL-SCNC: 121 MMOL/L — HIGH (ref 98–107)
CHLORIDE SERPL-SCNC: 122 MMOL/L — HIGH (ref 98–107)
CHLORIDE SERPL-SCNC: 125 MMOL/L — HIGH (ref 98–107)
CHOLEST SERPL-MCNC: 152 MG/DL — SIGNIFICANT CHANGE UP
CK SERPL-CCNC: 1227 U/L — HIGH (ref 25–170)
CO2 BLDV-SCNC: 19.8 MMOL/L — LOW (ref 22–26)
CO2 BLDV-SCNC: 26.5 MMOL/L — HIGH (ref 22–26)
CO2 BLDV-SCNC: 28 MMOL/L — HIGH (ref 22–26)
CO2 SERPL-SCNC: 16 MMOL/L — LOW (ref 22–31)
CO2 SERPL-SCNC: 18 MMOL/L — LOW (ref 22–31)
CO2 SERPL-SCNC: 22 MMOL/L — SIGNIFICANT CHANGE UP (ref 22–31)
CO2 SERPL-SCNC: 22 MMOL/L — SIGNIFICANT CHANGE UP (ref 22–31)
CO2 SERPL-SCNC: 23 MMOL/L — SIGNIFICANT CHANGE UP (ref 22–31)
CO2 SERPL-SCNC: 23 MMOL/L — SIGNIFICANT CHANGE UP (ref 22–31)
COCAINE METAB.OTHER UR-MCNC: NEGATIVE — SIGNIFICANT CHANGE UP
CREAT SERPL-MCNC: 1.89 MG/DL — HIGH (ref 0.5–1.3)
CREAT SERPL-MCNC: 2.06 MG/DL — HIGH (ref 0.5–1.3)
CREAT SERPL-MCNC: 2.09 MG/DL — HIGH (ref 0.5–1.3)
CREAT SERPL-MCNC: 2.5 MG/DL — HIGH (ref 0.5–1.3)
CREAT SERPL-MCNC: 2.66 MG/DL — HIGH (ref 0.5–1.3)
CREAT SERPL-MCNC: 3.27 MG/DL — HIGH (ref 0.5–1.3)
CREATININE URINE RESULT, DAU: 55 MG/DL — SIGNIFICANT CHANGE UP
CULTURE RESULTS: SIGNIFICANT CHANGE UP
EGFR: 18 ML/MIN/1.73M2 — LOW
EGFR: 23 ML/MIN/1.73M2 — LOW
EGFR: 24 ML/MIN/1.73M2 — LOW
EGFR: 30 ML/MIN/1.73M2 — LOW
EGFR: 31 ML/MIN/1.73M2 — LOW
EGFR: 34 ML/MIN/1.73M2 — LOW
EOSINOPHIL # BLD AUTO: 0 K/UL — SIGNIFICANT CHANGE UP (ref 0–0.5)
EOSINOPHIL NFR BLD AUTO: 0 % — SIGNIFICANT CHANGE UP (ref 0–6)
ESTIMATED AVERAGE GLUCOSE: 418 — SIGNIFICANT CHANGE UP
ETHANOL SERPL-MCNC: <10 MG/DL — SIGNIFICANT CHANGE UP
FENTANYL UR QL SCN: NEGATIVE — SIGNIFICANT CHANGE UP
GAS PNL BLDV: 156 MMOL/L — HIGH (ref 136–145)
GAS PNL BLDV: 156 MMOL/L — HIGH (ref 136–145)
GAS PNL BLDV: 158 MMOL/L — HIGH (ref 136–145)
GAS PNL BLDV: SIGNIFICANT CHANGE UP
GLUCOSE BLDC GLUCOMTR-MCNC: 264 MG/DL — HIGH (ref 70–99)
GLUCOSE BLDC GLUCOMTR-MCNC: 271 MG/DL — HIGH (ref 70–99)
GLUCOSE BLDC GLUCOMTR-MCNC: 279 MG/DL — HIGH (ref 70–99)
GLUCOSE BLDC GLUCOMTR-MCNC: 281 MG/DL — HIGH (ref 70–99)
GLUCOSE BLDC GLUCOMTR-MCNC: 285 MG/DL — HIGH (ref 70–99)
GLUCOSE BLDC GLUCOMTR-MCNC: 288 MG/DL — HIGH (ref 70–99)
GLUCOSE BLDC GLUCOMTR-MCNC: 289 MG/DL — HIGH (ref 70–99)
GLUCOSE BLDC GLUCOMTR-MCNC: 314 MG/DL — HIGH (ref 70–99)
GLUCOSE BLDC GLUCOMTR-MCNC: 320 MG/DL — HIGH (ref 70–99)
GLUCOSE BLDC GLUCOMTR-MCNC: 326 MG/DL — HIGH (ref 70–99)
GLUCOSE BLDC GLUCOMTR-MCNC: 343 MG/DL — HIGH (ref 70–99)
GLUCOSE BLDC GLUCOMTR-MCNC: 344 MG/DL — HIGH (ref 70–99)
GLUCOSE BLDC GLUCOMTR-MCNC: 345 MG/DL — HIGH (ref 70–99)
GLUCOSE BLDC GLUCOMTR-MCNC: 349 MG/DL — HIGH (ref 70–99)
GLUCOSE BLDC GLUCOMTR-MCNC: 350 MG/DL — HIGH (ref 70–99)
GLUCOSE BLDC GLUCOMTR-MCNC: 352 MG/DL — HIGH (ref 70–99)
GLUCOSE BLDC GLUCOMTR-MCNC: 405 MG/DL — HIGH (ref 70–99)
GLUCOSE BLDC GLUCOMTR-MCNC: 406 MG/DL — HIGH (ref 70–99)
GLUCOSE BLDC GLUCOMTR-MCNC: 424 MG/DL — HIGH (ref 70–99)
GLUCOSE BLDC GLUCOMTR-MCNC: 444 MG/DL — HIGH (ref 70–99)
GLUCOSE BLDC GLUCOMTR-MCNC: 459 MG/DL — CRITICAL HIGH (ref 70–99)
GLUCOSE BLDC GLUCOMTR-MCNC: 515 MG/DL — CRITICAL HIGH (ref 70–99)
GLUCOSE BLDC GLUCOMTR-MCNC: 573 MG/DL — CRITICAL HIGH (ref 70–99)
GLUCOSE BLDC GLUCOMTR-MCNC: >600 MG/DL — CRITICAL HIGH (ref 70–99)
GLUCOSE BLDV-MCNC: 260 MG/DL — HIGH (ref 70–99)
GLUCOSE BLDV-MCNC: 470 MG/DL — CRITICAL HIGH (ref 70–99)
GLUCOSE BLDV-MCNC: >685 MG/DL — CRITICAL HIGH (ref 70–99)
GLUCOSE SERPL-MCNC: 263 MG/DL — HIGH (ref 70–99)
GLUCOSE SERPL-MCNC: 316 MG/DL — HIGH (ref 70–99)
GLUCOSE SERPL-MCNC: 336 MG/DL — HIGH (ref 70–99)
GLUCOSE SERPL-MCNC: 346 MG/DL — HIGH (ref 70–99)
GLUCOSE SERPL-MCNC: 447 MG/DL — HIGH (ref 70–99)
GLUCOSE SERPL-MCNC: 608 MG/DL — CRITICAL HIGH (ref 70–99)
HCG UR QL: NEGATIVE — SIGNIFICANT CHANGE UP
HCO3 BLDV-SCNC: 19 MMOL/L — LOW (ref 22–29)
HCO3 BLDV-SCNC: 25 MMOL/L — SIGNIFICANT CHANGE UP (ref 22–29)
HCO3 BLDV-SCNC: 27 MMOL/L — SIGNIFICANT CHANGE UP (ref 22–29)
HCT VFR BLD CALC: 32.7 % — LOW (ref 34.5–45)
HCT VFR BLDA CALC: 28 % — LOW (ref 34.5–46.5)
HCT VFR BLDA CALC: 29 % — LOW (ref 34.5–46.5)
HCT VFR BLDA CALC: 37 % — SIGNIFICANT CHANGE UP (ref 34.5–46.5)
HDLC SERPL-MCNC: 34 MG/DL — LOW
HGB BLD CALC-MCNC: 12.2 G/DL — SIGNIFICANT CHANGE UP (ref 11.7–16.1)
HGB BLD CALC-MCNC: 9.2 G/DL — LOW (ref 11.7–16.1)
HGB BLD CALC-MCNC: 9.7 G/DL — LOW (ref 11.7–16.1)
HGB BLD-MCNC: 10.3 G/DL — LOW (ref 11.5–15.5)
IANC: 16.8 K/UL — HIGH (ref 1.8–7.4)
IMM GRANULOCYTES NFR BLD AUTO: 0.9 % — SIGNIFICANT CHANGE UP (ref 0–0.9)
INR BLD: 1 RATIO — SIGNIFICANT CHANGE UP (ref 0.85–1.18)
LACTATE BLDV-MCNC: 1.9 MMOL/L — SIGNIFICANT CHANGE UP (ref 0.5–2)
LACTATE BLDV-MCNC: 2.1 MMOL/L — HIGH (ref 0.5–2)
LACTATE BLDV-MCNC: 2.9 MMOL/L — HIGH (ref 0.5–2)
LIPID PNL WITH DIRECT LDL SERPL: 61 MG/DL — SIGNIFICANT CHANGE UP
LYMPHOCYTES # BLD AUTO: 1.01 K/UL — SIGNIFICANT CHANGE UP (ref 1–3.3)
LYMPHOCYTES # BLD AUTO: 5.1 % — LOW (ref 13–44)
MAGNESIUM SERPL-MCNC: 1.9 MG/DL — SIGNIFICANT CHANGE UP (ref 1.6–2.6)
MAGNESIUM SERPL-MCNC: 2.1 MG/DL — SIGNIFICANT CHANGE UP (ref 1.6–2.6)
MAGNESIUM SERPL-MCNC: 2.2 MG/DL — SIGNIFICANT CHANGE UP (ref 1.6–2.6)
MAGNESIUM SERPL-MCNC: 2.6 MG/DL — SIGNIFICANT CHANGE UP (ref 1.6–2.6)
MAGNESIUM SERPL-MCNC: 3 MG/DL — HIGH (ref 1.6–2.6)
MAGNESIUM SERPL-MCNC: 3.3 MG/DL — HIGH (ref 1.6–2.6)
MCHC RBC-ENTMCNC: 23.1 PG — LOW (ref 27–34)
MCHC RBC-ENTMCNC: 31.5 GM/DL — LOW (ref 32–36)
MCV RBC AUTO: 73.5 FL — LOW (ref 80–100)
METHADONE UR-MCNC: NEGATIVE — SIGNIFICANT CHANGE UP
MONOCYTES # BLD AUTO: 1.82 K/UL — HIGH (ref 0–0.9)
MONOCYTES NFR BLD AUTO: 9.2 % — SIGNIFICANT CHANGE UP (ref 2–14)
MRSA PCR RESULT.: SIGNIFICANT CHANGE UP
NEUTROPHILS # BLD AUTO: 16.8 K/UL — HIGH (ref 1.8–7.4)
NEUTROPHILS NFR BLD AUTO: 84.7 % — HIGH (ref 43–77)
NON HDL CHOLESTEROL: 118 MG/DL — SIGNIFICANT CHANGE UP
NRBC # BLD: 0 /100 WBCS — SIGNIFICANT CHANGE UP (ref 0–0)
NRBC # FLD: 0.02 K/UL — HIGH (ref 0–0)
OPIATES UR-MCNC: NEGATIVE — SIGNIFICANT CHANGE UP
OXYCODONE UR-MCNC: NEGATIVE — SIGNIFICANT CHANGE UP
PCO2 BLDV: 34 MMHG — LOW (ref 39–52)
PCO2 BLDV: 46 MMHG — SIGNIFICANT CHANGE UP (ref 39–52)
PCO2 BLDV: 53 MMHG — HIGH (ref 39–52)
PCP SPEC-MCNC: SIGNIFICANT CHANGE UP
PCP UR-MCNC: NEGATIVE — SIGNIFICANT CHANGE UP
PH BLDV: 7.28 — LOW (ref 7.32–7.43)
PH BLDV: 7.35 — SIGNIFICANT CHANGE UP (ref 7.32–7.43)
PH BLDV: 7.37 — SIGNIFICANT CHANGE UP (ref 7.32–7.43)
PHOSPHATE SERPL-MCNC: 0.5 MG/DL — CRITICAL LOW (ref 2.5–4.5)
PHOSPHATE SERPL-MCNC: 1.7 MG/DL — LOW (ref 2.5–4.5)
PHOSPHATE SERPL-MCNC: 2.1 MG/DL — LOW (ref 2.5–4.5)
PHOSPHATE SERPL-MCNC: 2.2 MG/DL — LOW (ref 2.5–4.5)
PHOSPHATE SERPL-MCNC: 2.5 MG/DL — SIGNIFICANT CHANGE UP (ref 2.5–4.5)
PHOSPHATE SERPL-MCNC: 4.2 MG/DL — SIGNIFICANT CHANGE UP (ref 2.5–4.5)
PLATELET # BLD AUTO: 423 K/UL — HIGH (ref 150–400)
PO2 BLDV: 28 MMHG — SIGNIFICANT CHANGE UP (ref 25–45)
PO2 BLDV: 29 MMHG — SIGNIFICANT CHANGE UP (ref 25–45)
PO2 BLDV: 82 MMHG — HIGH (ref 25–45)
POTASSIUM BLDV-SCNC: 3.3 MMOL/L — LOW (ref 3.5–5.1)
POTASSIUM BLDV-SCNC: 3.3 MMOL/L — LOW (ref 3.5–5.1)
POTASSIUM BLDV-SCNC: 3.8 MMOL/L — SIGNIFICANT CHANGE UP (ref 3.5–5.1)
POTASSIUM SERPL-MCNC: 2.7 MMOL/L — CRITICAL LOW (ref 3.5–5.3)
POTASSIUM SERPL-MCNC: 3 MMOL/L — LOW (ref 3.5–5.3)
POTASSIUM SERPL-MCNC: 3.1 MMOL/L — LOW (ref 3.5–5.3)
POTASSIUM SERPL-MCNC: 3.4 MMOL/L — LOW (ref 3.5–5.3)
POTASSIUM SERPL-MCNC: 3.6 MMOL/L — SIGNIFICANT CHANGE UP (ref 3.5–5.3)
POTASSIUM SERPL-MCNC: 4.6 MMOL/L — SIGNIFICANT CHANGE UP (ref 3.5–5.3)
POTASSIUM SERPL-SCNC: 2.7 MMOL/L — CRITICAL LOW (ref 3.5–5.3)
POTASSIUM SERPL-SCNC: 3 MMOL/L — LOW (ref 3.5–5.3)
POTASSIUM SERPL-SCNC: 3.1 MMOL/L — LOW (ref 3.5–5.3)
POTASSIUM SERPL-SCNC: 3.4 MMOL/L — LOW (ref 3.5–5.3)
POTASSIUM SERPL-SCNC: 3.6 MMOL/L — SIGNIFICANT CHANGE UP (ref 3.5–5.3)
POTASSIUM SERPL-SCNC: 4.6 MMOL/L — SIGNIFICANT CHANGE UP (ref 3.5–5.3)
PROCALCITONIN SERPL-MCNC: 0.57 NG/ML — HIGH (ref 0.02–0.1)
PROT SERPL-MCNC: 6.9 G/DL — SIGNIFICANT CHANGE UP (ref 6–8.3)
PROT SERPL-MCNC: 7 G/DL — SIGNIFICANT CHANGE UP (ref 6–8.3)
PROT SERPL-MCNC: 7.2 G/DL — SIGNIFICANT CHANGE UP (ref 6–8.3)
PROT SERPL-MCNC: 7.6 G/DL — SIGNIFICANT CHANGE UP (ref 6–8.3)
PROT SERPL-MCNC: 7.7 G/DL — SIGNIFICANT CHANGE UP (ref 6–8.3)
PROT SERPL-MCNC: 8.1 G/DL — SIGNIFICANT CHANGE UP (ref 6–8.3)
PROTHROM AB SERPL-ACNC: 11.2 SEC — SIGNIFICANT CHANGE UP (ref 9.5–13)
RBC # BLD: 4.45 M/UL — SIGNIFICANT CHANGE UP (ref 3.8–5.2)
RBC # FLD: 16.5 % — HIGH (ref 10.3–14.5)
RBC CASTS # UR COMP ASSIST: 2 /HPF — SIGNIFICANT CHANGE UP (ref 0–4)
REVIEW: SIGNIFICANT CHANGE UP
RH IG SCN BLD-IMP: POSITIVE — SIGNIFICANT CHANGE UP
S AUREUS DNA NOSE QL NAA+PROBE: DETECTED
SALICYLATES SERPL-MCNC: <0.3 MG/DL — LOW (ref 15–30)
SAO2 % BLDV: 40.4 % — LOW (ref 67–88)
SAO2 % BLDV: 49 % — LOW (ref 67–88)
SAO2 % BLDV: 97 % — HIGH (ref 67–88)
SODIUM SERPL-SCNC: 157 MMOL/L — HIGH (ref 135–145)
SODIUM SERPL-SCNC: 159 MMOL/L — HIGH (ref 135–145)
SODIUM SERPL-SCNC: 159 MMOL/L — HIGH (ref 135–145)
SODIUM SERPL-SCNC: 160 MMOL/L — CRITICAL HIGH (ref 135–145)
SODIUM SERPL-SCNC: 160 MMOL/L — CRITICAL HIGH (ref 135–145)
SODIUM SERPL-SCNC: 162 MMOL/L — CRITICAL HIGH (ref 135–145)
SPECIMEN SOURCE: SIGNIFICANT CHANGE UP
SQUAMOUS # UR AUTO: 8 /HPF — HIGH (ref 0–5)
THC UR QL: NEGATIVE — SIGNIFICANT CHANGE UP
TOXICOLOGY SCREEN, DRUGS OF ABUSE, SERUM RESULT: SIGNIFICANT CHANGE UP
TRIGL SERPL-MCNC: 284 MG/DL — HIGH
TROPONIN T, HIGH SENSITIVITY RESULT: 42 NG/L — SIGNIFICANT CHANGE UP
VANCOMYCIN FLD-MCNC: 7.5 UG/ML — SIGNIFICANT CHANGE UP
WBC # BLD: 19.82 K/UL — HIGH (ref 3.8–10.5)
WBC # FLD AUTO: 19.82 K/UL — HIGH (ref 3.8–10.5)
WBC UR QL: 12 /HPF — HIGH (ref 0–5)
YEAST-LIKE CELLS: PRESENT

## 2024-06-04 PROCEDURE — 93308 TTE F-UP OR LMTD: CPT | Mod: 26

## 2024-06-04 PROCEDURE — 76604 US EXAM CHEST: CPT | Mod: 26

## 2024-06-04 PROCEDURE — 99223 1ST HOSP IP/OBS HIGH 75: CPT

## 2024-06-04 PROCEDURE — 71045 X-RAY EXAM CHEST 1 VIEW: CPT | Mod: 26

## 2024-06-04 PROCEDURE — 76937 US GUIDE VASCULAR ACCESS: CPT | Mod: 26

## 2024-06-04 PROCEDURE — 93010 ELECTROCARDIOGRAM REPORT: CPT

## 2024-06-04 PROCEDURE — 93306 TTE W/DOPPLER COMPLETE: CPT | Mod: 26

## 2024-06-04 PROCEDURE — 99291 CRITICAL CARE FIRST HOUR: CPT | Mod: GC

## 2024-06-04 PROCEDURE — 36600 WITHDRAWAL OF ARTERIAL BLOOD: CPT

## 2024-06-04 PROCEDURE — 70450 CT HEAD/BRAIN W/O DYE: CPT | Mod: 26

## 2024-06-04 RX ORDER — METOCLOPRAMIDE HCL 10 MG
10 TABLET ORAL ONCE
Refills: 0 | Status: COMPLETED | OUTPATIENT
Start: 2024-06-04 | End: 2024-06-04

## 2024-06-04 RX ORDER — SODIUM CHLORIDE 9 MG/ML
1000 INJECTION, SOLUTION INTRAVENOUS
Refills: 0 | Status: DISCONTINUED | OUTPATIENT
Start: 2024-06-04 | End: 2024-06-04

## 2024-06-04 RX ORDER — SODIUM CHLORIDE 9 MG/ML
1000 INJECTION, SOLUTION INTRAVENOUS ONCE
Refills: 0 | Status: COMPLETED | OUTPATIENT
Start: 2024-06-04 | End: 2024-06-04

## 2024-06-04 RX ORDER — POTASSIUM CHLORIDE 20 MEQ
40 PACKET (EA) ORAL ONCE
Refills: 0 | Status: COMPLETED | OUTPATIENT
Start: 2024-06-04 | End: 2024-06-04

## 2024-06-04 RX ORDER — POTASSIUM CHLORIDE 20 MEQ
10 PACKET (EA) ORAL
Refills: 0 | Status: COMPLETED | OUTPATIENT
Start: 2024-06-04 | End: 2024-06-05

## 2024-06-04 RX ORDER — SODIUM CHLORIDE 9 MG/ML
1000 INJECTION, SOLUTION INTRAVENOUS
Refills: 0 | Status: DISCONTINUED | OUTPATIENT
Start: 2024-06-04 | End: 2024-06-05

## 2024-06-04 RX ORDER — POTASSIUM CHLORIDE 20 MEQ
10 PACKET (EA) ORAL ONCE
Refills: 0 | Status: COMPLETED | OUTPATIENT
Start: 2024-06-04 | End: 2024-06-04

## 2024-06-04 RX ORDER — POTASSIUM CHLORIDE 20 MEQ
10 PACKET (EA) ORAL
Refills: 0 | Status: DISCONTINUED | OUTPATIENT
Start: 2024-06-04 | End: 2024-06-04

## 2024-06-04 RX ORDER — SODIUM,POTASSIUM PHOSPHATES 278-250MG
2 POWDER IN PACKET (EA) ORAL ONCE
Refills: 0 | Status: COMPLETED | OUTPATIENT
Start: 2024-06-04 | End: 2024-06-04

## 2024-06-04 RX ORDER — PIPERACILLIN AND TAZOBACTAM 4; .5 G/20ML; G/20ML
3.38 INJECTION, POWDER, LYOPHILIZED, FOR SOLUTION INTRAVENOUS EVERY 8 HOURS
Refills: 0 | Status: DISCONTINUED | OUTPATIENT
Start: 2024-06-04 | End: 2024-06-05

## 2024-06-04 RX ORDER — POTASSIUM PHOSPHATE, MONOBASIC POTASSIUM PHOSPHATE, DIBASIC 236; 224 MG/ML; MG/ML
30 INJECTION, SOLUTION INTRAVENOUS ONCE
Refills: 0 | Status: COMPLETED | OUTPATIENT
Start: 2024-06-04 | End: 2024-06-04

## 2024-06-04 RX ORDER — POTASSIUM PHOSPHATE, MONOBASIC POTASSIUM PHOSPHATE, DIBASIC 236; 224 MG/ML; MG/ML
15 INJECTION, SOLUTION INTRAVENOUS ONCE
Refills: 0 | Status: COMPLETED | OUTPATIENT
Start: 2024-06-04 | End: 2024-06-04

## 2024-06-04 RX ORDER — POTASSIUM CHLORIDE 20 MEQ
10 PACKET (EA) ORAL
Refills: 0 | Status: COMPLETED | OUTPATIENT
Start: 2024-06-04 | End: 2024-06-04

## 2024-06-04 RX ORDER — MAGNESIUM SULFATE 500 MG/ML
1 VIAL (ML) INJECTION ONCE
Refills: 0 | Status: COMPLETED | OUTPATIENT
Start: 2024-06-04 | End: 2024-06-05

## 2024-06-04 RX ORDER — ACETAMINOPHEN 500 MG
1000 TABLET ORAL ONCE
Refills: 0 | Status: COMPLETED | OUTPATIENT
Start: 2024-06-04 | End: 2024-06-04

## 2024-06-04 RX ORDER — CHLORHEXIDINE GLUCONATE 213 G/1000ML
1 SOLUTION TOPICAL DAILY
Refills: 0 | Status: DISCONTINUED | OUTPATIENT
Start: 2024-06-04 | End: 2024-06-11

## 2024-06-04 RX ORDER — DEXTROSE MONOHYDRATE, SODIUM CHLORIDE, AND POTASSIUM CHLORIDE 50; .745; 4.5 G/1000ML; G/1000ML; G/1000ML
1000 INJECTION, SOLUTION INTRAVENOUS
Refills: 0 | Status: DISCONTINUED | OUTPATIENT
Start: 2024-06-04 | End: 2024-06-04

## 2024-06-04 RX ADMIN — Medication 100 MILLIEQUIVALENT(S): at 20:59

## 2024-06-04 RX ADMIN — INSULIN HUMAN 8 UNIT(S)/HR: 100 INJECTION, SOLUTION SUBCUTANEOUS at 01:27

## 2024-06-04 RX ADMIN — SODIUM CHLORIDE 250 MILLILITER(S): 9 INJECTION, SOLUTION INTRAVENOUS at 01:26

## 2024-06-04 RX ADMIN — PIPERACILLIN AND TAZOBACTAM 25 GRAM(S): 4; .5 INJECTION, POWDER, LYOPHILIZED, FOR SOLUTION INTRAVENOUS at 21:49

## 2024-06-04 RX ADMIN — Medication 100 MILLIEQUIVALENT(S): at 03:58

## 2024-06-04 RX ADMIN — PIPERACILLIN AND TAZOBACTAM 25 GRAM(S): 4; .5 INJECTION, POWDER, LYOPHILIZED, FOR SOLUTION INTRAVENOUS at 13:12

## 2024-06-04 RX ADMIN — Medication 400 MILLIGRAM(S): at 21:27

## 2024-06-04 RX ADMIN — Medication 100 MILLIEQUIVALENT(S): at 17:46

## 2024-06-04 RX ADMIN — SODIUM CHLORIDE 250 MILLILITER(S): 9 INJECTION, SOLUTION INTRAVENOUS at 00:21

## 2024-06-04 RX ADMIN — Medication 1000 MILLIGRAM(S): at 22:00

## 2024-06-04 RX ADMIN — Medication 100 MILLIEQUIVALENT(S): at 23:37

## 2024-06-04 RX ADMIN — Medication 100 MILLIEQUIVALENT(S): at 15:30

## 2024-06-04 RX ADMIN — CHLORHEXIDINE GLUCONATE 1 APPLICATION(S): 213 SOLUTION TOPICAL at 11:20

## 2024-06-04 RX ADMIN — Medication 100 MILLIEQUIVALENT(S): at 21:49

## 2024-06-04 RX ADMIN — INSULIN HUMAN 9 UNIT(S)/HR: 100 INJECTION, SOLUTION SUBCUTANEOUS at 19:31

## 2024-06-04 RX ADMIN — INSULIN HUMAN 12 UNIT(S)/HR: 100 INJECTION, SOLUTION SUBCUTANEOUS at 11:23

## 2024-06-04 RX ADMIN — SODIUM CHLORIDE 150 MILLILITER(S): 9 INJECTION, SOLUTION INTRAVENOUS at 11:19

## 2024-06-04 RX ADMIN — Medication 100 MILLIEQUIVALENT(S): at 19:39

## 2024-06-04 RX ADMIN — POTASSIUM PHOSPHATE, MONOBASIC POTASSIUM PHOSPHATE, DIBASIC 83.33 MILLIMOLE(S): 236; 224 INJECTION, SOLUTION INTRAVENOUS at 03:58

## 2024-06-04 RX ADMIN — Medication 1000 MILLIGRAM(S): at 00:32

## 2024-06-04 RX ADMIN — HEPARIN SODIUM 5000 UNIT(S): 5000 INJECTION INTRAVENOUS; SUBCUTANEOUS at 21:49

## 2024-06-04 RX ADMIN — SODIUM CHLORIDE 150 MILLILITER(S): 9 INJECTION, SOLUTION INTRAVENOUS at 20:18

## 2024-06-04 RX ADMIN — Medication 10 MILLIGRAM(S): at 20:58

## 2024-06-04 RX ADMIN — Medication 2 PACKET(S): at 19:39

## 2024-06-04 RX ADMIN — SODIUM CHLORIDE 250 MILLILITER(S): 9 INJECTION, SOLUTION INTRAVENOUS at 07:38

## 2024-06-04 RX ADMIN — Medication 2 PACKET(S): at 23:36

## 2024-06-04 RX ADMIN — Medication 40 MILLIEQUIVALENT(S): at 19:39

## 2024-06-04 RX ADMIN — SODIUM CHLORIDE 1000 MILLILITER(S): 9 INJECTION, SOLUTION INTRAVENOUS at 11:20

## 2024-06-04 RX ADMIN — Medication 100 MILLIEQUIVALENT(S): at 16:35

## 2024-06-04 RX ADMIN — Medication 100 MILLIEQUIVALENT(S): at 06:00

## 2024-06-04 RX ADMIN — SODIUM CHLORIDE 1000 MILLILITER(S): 9 INJECTION, SOLUTION INTRAVENOUS at 17:46

## 2024-06-04 RX ADMIN — Medication 40 MILLIEQUIVALENT(S): at 23:37

## 2024-06-04 RX ADMIN — PIPERACILLIN AND TAZOBACTAM 25 GRAM(S): 4; .5 INJECTION, POWDER, LYOPHILIZED, FOR SOLUTION INTRAVENOUS at 05:08

## 2024-06-04 RX ADMIN — DEXTROSE MONOHYDRATE, SODIUM CHLORIDE, AND POTASSIUM CHLORIDE 150 MILLILITER(S): 50; .745; 4.5 INJECTION, SOLUTION INTRAVENOUS at 15:27

## 2024-06-04 RX ADMIN — Medication 100 MILLIEQUIVALENT(S): at 02:49

## 2024-06-04 RX ADMIN — POTASSIUM PHOSPHATE, MONOBASIC POTASSIUM PHOSPHATE, DIBASIC 83.33 MILLIMOLE(S): 236; 224 INJECTION, SOLUTION INTRAVENOUS at 01:27

## 2024-06-04 RX ADMIN — Medication 100 MILLIEQUIVALENT(S): at 07:08

## 2024-06-04 RX ADMIN — Medication 100 MILLIEQUIVALENT(S): at 03:52

## 2024-06-04 RX ADMIN — HEPARIN SODIUM 5000 UNIT(S): 5000 INJECTION INTRAVENOUS; SUBCUTANEOUS at 05:08

## 2024-06-04 RX ADMIN — POTASSIUM PHOSPHATE, MONOBASIC POTASSIUM PHOSPHATE, DIBASIC 62.5 MILLIMOLE(S): 236; 224 INJECTION, SOLUTION INTRAVENOUS at 15:32

## 2024-06-04 RX ADMIN — INSULIN HUMAN 6 UNIT(S)/HR: 100 INJECTION, SOLUTION SUBCUTANEOUS at 07:39

## 2024-06-04 RX ADMIN — HEPARIN SODIUM 5000 UNIT(S): 5000 INJECTION INTRAVENOUS; SUBCUTANEOUS at 13:14

## 2024-06-04 RX ADMIN — Medication 100 MILLIEQUIVALENT(S): at 00:12

## 2024-06-04 NOTE — ED ADULT NURSE REASSESSMENT NOTE - NS ED NURSE REASSESS COMMENT FT1
iv meds infusing well without complications, maintenance fluids bag replaced. iv sites WNL, no swelling or redness noted to iv sites. pt placed on portable zoll monitor with continuous pulse ox. pt transported to MICU via stretcher with mobile critical care RN at bedside with ED tech for transport. all belongings with pt, safety measures maintained, side rails up x2 iv meds infusing well without complications, maintenance fluids bag replaced. iv sites WNL, no swelling or redness noted to iv sites. repeat BGL as documented. pt placed on portable zoll monitor with continuous pulse ox. pt transported to MICU via stretcher with mobile critical care RN at bedside with ED tech for transport. all belongings with pt, safety measures maintained, side rails up x2

## 2024-06-04 NOTE — CONSULT NOTE ADULT - ATTENDING COMMENTS
39F presents with DKA, BG > 1500, newly diagnosed DM, HbA1c 16.2%, AMS.  Rule out type 1 DM/ KELLY vs. ketosis prone DM2.  Follow DKA protocol until resolution, care per MICU.  When ready transition to basal bolus insulin.  Will need DM education when AMS improved.  Discussed with sister at bedside.  Patient is high risk and high level decision making, requiring ICU level of care.    Vickie Hernandez MD  Division of Endocrinology  Pager: [phone]    If after 6PM or before 9AM, or on weekends/holidays, please call endocrine answering service for assistance ([phone]).  For nonurgent matters email [email or web address] for assistance.

## 2024-06-04 NOTE — CHART NOTE - NSCHARTNOTEFT_GEN_A_CORE
Indication:  Daily POCUS    PROCEDURE:  [ X ] LIMITED ECHO  [ X ] LIMITED CHEST  [ ] LIMITED RETROPERITONEAL  [ ] LIMITED ABDOMINAL  [ ] LIMITED DVT  [ ] NEEDLE GUIDANCE VASCULAR  [ ] NEEDLE GUIDANCE THORACENTESIS  [ ] NEEDLE GUIDANCE PARACENTESIS  [ ] NEEDLE GUIDANCE PERICARDIOCENTESIS  [ ] OTHER    FINDINGS:  Chest: A-line predominant, normal aeration pattern bilat. No effusions bilat. No consolidation noted bilaterally.    ECHO: Tachycardic. Grossly normal RV and LV function with no wall motion abnormalities, septal bowing/flattening, or gross valvular pathology. LV>RV in Apical view  No pericardial effusion    INTERPRETATION:  Grossly normal Chest and Cardiac POCUS      Images stored in [employer] Indication:  acute hypoxemic respiratory failure    PROCEDURE:  [ X ] LIMITED ECHO  [ X ] LIMITED CHEST  [ ] LIMITED RETROPERITONEAL  [ ] LIMITED ABDOMINAL  [ ] LIMITED DVT  [ ] NEEDLE GUIDANCE VASCULAR  [ ] NEEDLE GUIDANCE THORACENTESIS  [ ] NEEDLE GUIDANCE PARACENTESIS  [ ] NEEDLE GUIDANCE PERICARDIOCENTESIS  [ ] OTHER    FINDINGS:  Chest: A-line predominant, normal aeration pattern bilat. No effusions bilat. No consolidation noted bilaterally.    ECHO: Tachycardic. Grossly normal RV and LV function with no wall motion abnormalities, septal bowing/flattening. LV>RV in Apical view  No pericardial effusion    INTERPRETATION:  Grossly normal Chest and Cardiac POCUS      Images stored in [employer]    Attending Attestation:  I was present during the key portions of the procedure and immediately available during the entire procedure.  Ophelia Rosales MD  Attending  Pulmonary & Critical Care Medicine

## 2024-06-04 NOTE — PROGRESS NOTE ADULT - SUBJECTIVE AND OBJECTIVE BOX
INTERVAL HPI/OVERNIGHT EVENTS:    SUBJECTIVE: Patient seen and examined at bedside.     ROS: All negative except as listed above.    VITAL SIGNS:  ICU Vital Signs Last 24 Hrs  T(C): 37.1 (04 Jun 2024 04:00), Max: 38.2 (04 Jun 2024 00:35)  T(F): 98.8 (04 Jun 2024 04:00), Max: 100.8 (04 Jun 2024 00:35)  HR: 131 (04 Jun 2024 05:00) (128 - 148)  BP: 163/87 (04 Jun 2024 05:00) (100/60 - 163/87)  BP(mean): 104 (04 Jun 2024 05:00) (87 - 104)  ABP: --  ABP(mean): --  RR: 25 (04 Jun 2024 05:00) (10 - 30)  SpO2: 100% (04 Jun 2024 05:00) (98% - 100%)    O2 Parameters below as of 04 Jun 2024 05:00  Patient On (Oxygen Delivery Method): nasal cannula  O2 Flow (L/min): 2          Plateau pressure:   P/F ratio:     06-03 @ 07:01  -  06-04 @ 07:00  --------------------------------------------------------  IN: 2353 mL / OUT: 325 mL / NET: 2028 mL      CAPILLARY BLOOD GLUCOSE      POCT Blood Glucose.: 320 mg/dL (04 Jun 2024 06:22)      ECG: reviewed.    PHYSICAL EXAM:    GENERAL: NAD, lying in bed comfortably  HEAD:  Atraumatic, normocephalic  EYES: EOMI, PERRLA, conjunctiva and sclera clear  NECK: Supple, trachea midline, no JVD  HEART: Regular rate and rhythm, no murmurs, rubs, or gallops  LUNGS: Unlabored respirations.  Clear to auscultation bilaterally, no crackles, wheezing, or rhonchi  ABDOMEN: Soft, nontender, nondistended, +BS  EXTREMITIES: 2+ peripheral pulses bilaterally, cap refill<2 secs. No clubbing, cyanosis, or edema  NERVOUS SYSTEM:  A&Ox3, following commands, moving all extremities, no focal deficits   SKIN: No rashes or lesions    MEDICATIONS:  MEDICATIONS  (STANDING):  heparin   Injectable 5000 Unit(s) SubCutaneous every 8 hours  insulin regular Infusion 6 Unit(s)/Hr (6 mL/Hr) IV Continuous <Continuous>  lactated ringers 1000 milliLiter(s) (250 mL/Hr) IV Continuous <Continuous>  piperacillin/tazobactam IVPB.. 3.375 Gram(s) IV Intermittent every 12 hours  potassium chloride  10 mEq/100 mL IVPB 10 milliEquivalent(s) IV Intermittent every 1 hour  potassium chloride  10 mEq/100 mL IVPB 10 milliEquivalent(s) IV Intermittent every 1 hour    MEDICATIONS  (PRN):      ALLERGIES:  Allergies    No Known Allergies    Intolerances        LABS:                        10.3   19.82 )-----------( 423      ( 04 Jun 2024 01:40 )             32.7     06-04    157<H>  |  117<H>  |  64<H>  ----------------------------<  608<HH>  3.1<L>   |  16<L>  |  3.27<H>    Ca    10.1      04 Jun 2024 01:40  Phos  0.5     06-04  Mg     3.30     06-04    TPro  8.1  /  Alb  3.7  /  TBili  0.4  /  DBili  x   /  AST  23  /  ALT  12  /  AlkPhos  129<H>  06-04    PT/INR - ( 04 Jun 2024 01:40 )   PT: 11.2 sec;   INR: 1.00 ratio         PTT - ( 04 Jun 2024 01:40 )  PTT:21.8 sec  Urinalysis Basic - ( 04 Jun 2024 01:40 )    Color: x / Appearance: x / SG: x / pH: x  Gluc: 608 mg/dL / Ketone: x  / Bili: x / Urobili: x   Blood: x / Protein: x / Nitrite: x   Leuk Esterase: x / RBC: x / WBC x   Sq Epi: x / Non Sq Epi: x / Bacteria: x      ABG:      vBG:  pH, Venous: 7.35 (06-04-24 @ 01:40)  pCO2, Venous: 34 mmHg (06-04-24 @ 01:40)  pO2, Venous: 82 mmHg (06-04-24 @ 01:40)  HCO3, Venous: 19 mmol/L (06-04-24 @ 01:40)  pH, Venous: 7.11 (06-03-24 @ 16:30)  pCO2, Venous: 34 mmHg (06-03-24 @ 16:30)  pO2, Venous: 45 mmHg (06-03-24 @ 16:30)  HCO3, Venous: 11 mmol/L (06-03-24 @ 16:30)  pH, Venous: 7.10 (06-03-24 @ 15:13)  pCO2, Venous: 36 mmHg (06-03-24 @ 15:13)  pO2, Venous: 35 mmHg (06-03-24 @ 15:13)  HCO3, Venous: 11 mmol/L (06-03-24 @ 15:13)    Micro:        RADIOLOGY & ADDITIONAL TESTS: Reviewed.   INTERVAL HPI/OVERNIGHT EVENTS: Overnight, patient remains confused A&Ox0.     SUBJECTIVE: Patient seen and examined at bedside.     VITAL SIGNS:  ICU Vital Signs Last 24 Hrs  T(C): 37.1 (04 Jun 2024 04:00), Max: 38.2 (04 Jun 2024 00:35)  T(F): 98.8 (04 Jun 2024 04:00), Max: 100.8 (04 Jun 2024 00:35)  HR: 131 (04 Jun 2024 05:00) (128 - 148)  BP: 163/87 (04 Jun 2024 05:00) (100/60 - 163/87)  BP(mean): 104 (04 Jun 2024 05:00) (87 - 104)  ABP: --  ABP(mean): --  RR: 25 (04 Jun 2024 05:00) (10 - 30)  SpO2: 100% (04 Jun 2024 05:00) (98% - 100%)    O2 Parameters below as of 04 Jun 2024 05:00  Patient On (Oxygen Delivery Method): nasal cannula  O2 Flow (L/min): 2    Plateau pressure:   P/F ratio:     06-03 @ 07:01  -  06-04 @ 07:00  --------------------------------------------------------  IN: 2353 mL / OUT: 325 mL / NET: 2028 mL    CAPILLARY BLOOD GLUCOSE  POCT Blood Glucose.: 320 mg/dL (04 Jun 2024 06:22)    PHYSICAL EXAM:  GENERAL: NAD  HEAD: Atraumatic, normocephalic  EYES: EOMI, PERRLA, conjunctiva and sclera clear  NECK: Supple, trachea midline, no JVD  HEART: Regular rate and rhythm, no murmurs, rubs, or gallops  LUNGS: Unlabored respirations.  Clear to auscultation bilaterally, no crackles, wheezing, or rhonchi  ABDOMEN: Soft, nontender, nondistended, +BS  EXTREMITIES: 2+ peripheral pulses bilaterally, cap refill<2 secs. No clubbing, cyanosis, or edema  NERVOUS SYSTEM:  A&Ox0, able to clench   SKIN: No rashes or lesions    MEDICATIONS:  MEDICATIONS  (STANDING):  heparin   Injectable 5000 Unit(s) SubCutaneous every 8 hours  insulin regular Infusion 6 Unit(s)/Hr (6 mL/Hr) IV Continuous <Continuous>  lactated ringers 1000 milliLiter(s) (250 mL/Hr) IV Continuous <Continuous>  piperacillin/tazobactam IVPB.. 3.375 Gram(s) IV Intermittent every 12 hours  potassium chloride  10 mEq/100 mL IVPB 10 milliEquivalent(s) IV Intermittent every 1 hour  potassium chloride  10 mEq/100 mL IVPB 10 milliEquivalent(s) IV Intermittent every 1 hour    MEDICATIONS  (PRN):      ALLERGIES:  Allergies    No Known Allergies    Intolerances        LABS:                        10.3   19.82 )-----------( 423      ( 04 Jun 2024 01:40 )             32.7     06-04    157<H>  |  117<H>  |  64<H>  ----------------------------<  608<HH>  3.1<L>   |  16<L>  |  3.27<H>    Ca    10.1      04 Jun 2024 01:40  Phos  0.5     06-04  Mg     3.30     06-04    TPro  8.1  /  Alb  3.7  /  TBili  0.4  /  DBili  x   /  AST  23  /  ALT  12  /  AlkPhos  129<H>  06-04    PT/INR - ( 04 Jun 2024 01:40 )   PT: 11.2 sec;   INR: 1.00 ratio         PTT - ( 04 Jun 2024 01:40 )  PTT:21.8 sec  Urinalysis Basic - ( 04 Jun 2024 01:40 )    Color: x / Appearance: x / SG: x / pH: x  Gluc: 608 mg/dL / Ketone: x  / Bili: x / Urobili: x   Blood: x / Protein: x / Nitrite: x   Leuk Esterase: x / RBC: x / WBC x   Sq Epi: x / Non Sq Epi: x / Bacteria: x      ABG:      vBG:  pH, Venous: 7.35 (06-04-24 @ 01:40)  pCO2, Venous: 34 mmHg (06-04-24 @ 01:40)  pO2, Venous: 82 mmHg (06-04-24 @ 01:40)  HCO3, Venous: 19 mmol/L (06-04-24 @ 01:40)  pH, Venous: 7.11 (06-03-24 @ 16:30)  pCO2, Venous: 34 mmHg (06-03-24 @ 16:30)  pO2, Venous: 45 mmHg (06-03-24 @ 16:30)  HCO3, Venous: 11 mmol/L (06-03-24 @ 16:30)  pH, Venous: 7.10 (06-03-24 @ 15:13)  pCO2, Venous: 36 mmHg (06-03-24 @ 15:13)  pO2, Venous: 35 mmHg (06-03-24 @ 15:13)  HCO3, Venous: 11 mmol/L (06-03-24 @ 15:13)    Micro:        RADIOLOGY & ADDITIONAL TESTS: Reviewed.   INTERVAL HPI/OVERNIGHT EVENTS: Overnight, patient remains confused A&Ox0.     SUBJECTIVE: Patient seen and examined at bedside.     VITAL SIGNS:  ICU Vital Signs Last 24 Hrs  T(C): 37.1 (04 Jun 2024 04:00), Max: 38.2 (04 Jun 2024 00:35)  T(F): 98.8 (04 Jun 2024 04:00), Max: 100.8 (04 Jun 2024 00:35)  HR: 131 (04 Jun 2024 05:00) (128 - 148)  BP: 163/87 (04 Jun 2024 05:00) (100/60 - 163/87)  BP(mean): 104 (04 Jun 2024 05:00) (87 - 104)  ABP: --  ABP(mean): --  RR: 25 (04 Jun 2024 05:00) (10 - 30)  SpO2: 100% (04 Jun 2024 05:00) (98% - 100%)    O2 Parameters below as of 04 Jun 2024 05:00  Patient On (Oxygen Delivery Method): nasal cannula  O2 Flow (L/min): 2    Plateau pressure:   P/F ratio:     06-03 @ 07:01  -  06-04 @ 07:00  --------------------------------------------------------  IN: 2353 mL / OUT: 325 mL / NET: 2028 mL    CAPILLARY BLOOD GLUCOSE  POCT Blood Glucose.: 320 mg/dL (04 Jun 2024 06:22)    PHYSICAL EXAM:  GENERAL: NAD  HEAD: Atraumatic, normocephalic  EYES: pupils reactive   NECK: Supple, trachea midline, no JVD  HEART: Regular rate and rhythm, no murmurs, rubs, or gallops  LUNGS: Unlabored respirations.  Clear to auscultation bilaterally, no crackles, wheezing, or rhonchi  ABDOMEN: Soft, nontender, nondistended, +BS  EXTREMITIES: 2+ peripheral pulses bilaterally, cap refill<2 secs. No clubbing, cyanosis, or edema  NERVOUS SYSTEM:  A&Ox0, able to clench fist and wiggle toes   SKIN: No rashes or lesions    MEDICATIONS:  MEDICATIONS  (STANDING):  heparin   Injectable 5000 Unit(s) SubCutaneous every 8 hours  insulin regular Infusion 6 Unit(s)/Hr (6 mL/Hr) IV Continuous <Continuous>  lactated ringers 1000 milliLiter(s) (250 mL/Hr) IV Continuous <Continuous>  piperacillin/tazobactam IVPB.. 3.375 Gram(s) IV Intermittent every 12 hours  potassium chloride  10 mEq/100 mL IVPB 10 milliEquivalent(s) IV Intermittent every 1 hour  potassium chloride  10 mEq/100 mL IVPB 10 milliEquivalent(s) IV Intermittent every 1 hour    MEDICATIONS  (PRN):    ALLERGIES:  Allergies    No Known Allergies    Intolerances    LABS:                        10.3   19.82 )-----------( 423      ( 04 Jun 2024 01:40 )             32.7     06-04    157<H>  |  117<H>  |  64<H>  ----------------------------<  608<HH>  3.1<L>   |  16<L>  |  3.27<H>    Ca    10.1      04 Jun 2024 01:40  Phos  0.5     06-04  Mg     3.30     06-04    TPro  8.1  /  Alb  3.7  /  TBili  0.4  /  DBili  x   /  AST  23  /  ALT  12  /  AlkPhos  129<H>  06-04    PT/INR - ( 04 Jun 2024 01:40 )   PT: 11.2 sec;   INR: 1.00 ratio         PTT - ( 04 Jun 2024 01:40 )  PTT:21.8 sec  Urinalysis Basic - ( 04 Jun 2024 01:40 )    Color: x / Appearance: x / SG: x / pH: x  Gluc: 608 mg/dL / Ketone: x  / Bili: x / Urobili: x   Blood: x / Protein: x / Nitrite: x   Leuk Esterase: x / RBC: x / WBC x   Sq Epi: x / Non Sq Epi: x / Bacteria: x    ABG:    vBG:  pH, Venous: 7.35 (06-04-24 @ 01:40)  pCO2, Venous: 34 mmHg (06-04-24 @ 01:40)  pO2, Venous: 82 mmHg (06-04-24 @ 01:40)  HCO3, Venous: 19 mmol/L (06-04-24 @ 01:40)  pH, Venous: 7.11 (06-03-24 @ 16:30)  pCO2, Venous: 34 mmHg (06-03-24 @ 16:30)  pO2, Venous: 45 mmHg (06-03-24 @ 16:30)  HCO3, Venous: 11 mmol/L (06-03-24 @ 16:30)  pH, Venous: 7.10 (06-03-24 @ 15:13)  pCO2, Venous: 36 mmHg (06-03-24 @ 15:13)  pO2, Venous: 35 mmHg (06-03-24 @ 15:13)  HCO3, Venous: 11 mmol/L (06-03-24 @ 15:13)    Micro:        RADIOLOGY & ADDITIONAL TESTS: Reviewed.

## 2024-06-04 NOTE — CONSULT NOTE ADULT - PROBLEM SELECTOR PROBLEM 1
4/25/2019    Mg Nelson MD  [address]  [facility] [address]    RE: Bairon DOWNS Cristian       Dear Colleague,    I had the pleasure of seeing Bairon Foster in the [facility].    HPI and Plan:   I had the pleasure of seeing Bairon Foster and his wife today in cardiology clinic follow up following his recent hospitalization. He is a pleasant 87 year old patient of Dr. Hope.    He has seen Dr. Carrizales in the past for his venous insufficiency (bilateral GSV ablations in August 2017) and Dr. Rico for his history of VT and chronic atrial fibrillation anticoagulated, he was switched during this recent hospitalization from Pradaxa to Eliquis 2.5 this is had epistaxis.  For detailed history please see my note from March 27, 2019.    Mr. Foster has a history of coronary artery disease, progressive worsening MR and TR. He had a CHELLE in March demonstrating moderate to severe to severe mitral regurgitation.  He was able to see Dr. Hope earlier this month and is scheduled May 20 for mitral clip procedure at the [facility].  He has had a recent heart failure exasperation hospitalization.  And more recently he had a questionable GI bleed which resulted in an admission but which was subsequently felt to be epitaxis and he was switched to low-dose Eliquis.  There is discussion about whether he be a good watchman candidate once his mitral valve is repaired.     He has a single lead pacemaker which is getting close to the end of battery life.  His last device check demonstrated an underlying rhythm of A. fib with rates of 40-50. He has had chronically low platelets, during his recent hospitalization he had a bone marrow biopsy and has a follow-up office visit with Dr. Chavez next week.  Dr. Chavez felt as long as his platelets stayed above 50 that he would be okay on Eliquis, that sometimes the parameters can be lowered depending on the clinical picture.    I saw Mr. Foster when I  was in the hospital last week and I started him on Eliquis 2.5 mg twice daily.  He denies any recurrent epistaxis, hematuria or melena.  His weight has continued to go down on Bumex 1 mg daily.  His creatinine is starting to creep up though his BUN has actually declined.  His hemoglobin is stable today at 9.9.  He has had a lot of appointments and is fatigued but is overall doing well and recovering at home.    Physical Exam  Please see Below     Assessment and Plan  1.  Severe MR.  He has plans to have a mitral clip on May 20.  He has had issues with heart failure for the last year with recent hospitalizations.  I talked to our nurse and Dr. Hope's clinic and will schedule a 6-minute walk next week as part of that work-up.  He had a right heart cath in April demonstrating mild elevated pulmonary hypertension, mildly elevated right side filling pressures.  I did decrease his diuretic today, currently he is taking Bumex 1 mg daily.  I continued him on 1 mg every other day and half a tablet every other day.  He will continue to weigh himself and if he has significant increase in weight then will go back to the daily 1 mg dosing.  2.  Permanent atrial fibrillation with a permanent pacemaker.  He is tolerating his Eliquis 2.5 mg twice daily, previously on Pradaxa he had several incidence of epistaxis.  His devices reaching the end of its battery life will need to be replaced, he has a device check scheduled in the next couple of weeks.  He may be a good candidate for watchman once his valve is fixed.  3.  Coronary artery disease with previous stenting.  He had a coronary angiogram and a right heart catheter in April admission and was found to have mild nonobstructive coronary disease.       Thank you for allowing me to care for Bairon Foster today.    WILLIS Bullard, CNP  Cardiology    Greater than half of this 45 minute appointment was spent in counseling and coordination of care.      Voice recognition  software was used for this note, I have reviewed this note, but errors may have been missed.    No orders of the defined types were placed in this encounter.    Orders Placed This Encounter   Medications     bumetanide (BUMEX) 1 MG tablet     Sig: Take 1 tablet (1 mg) by mouth every other day     Dispense:  30 tablet     Refill:  0     bumetanide (BUMEX) 0.5 MG tablet     Sig: Take 1 tablet (0.5 mg) by mouth every other day     Medications Discontinued During This Encounter   Medication Reason     bumetanide (BUMEX) 1 MG tablet          CURRENT MEDICATIONS:  Current Outpatient Medications   Medication Sig Dispense Refill     acetaminophen (TYLENOL) 500 MG tablet Take 2 tablets (1,000 mg) by mouth every 6 hours as needed for mild pain 100 tablet 0     bumetanide (BUMEX) 0.5 MG tablet Take 1 tablet (0.5 mg) by mouth every other day       bumetanide (BUMEX) 1 MG tablet Take 1 tablet (1 mg) by mouth every other day 30 tablet 0     buPROPion (WELLBUTRIN XL) 150 MG 24 hr tablet Take 150 mg by mouth every morning       Carboxymethylcellulose Sod PF (CELLUVISC/REFRESH LIQUIGEL) 1 % ophthalmic solution Place 1 drop into both eyes 3 times daily as needed for dry eyes       carvedilol (COREG) 6.25 MG tablet TAKE 1 TABLET (6.25 MG) BY MOUTH 2 TIMES DAILY (WITH MEALS) 180 tablet 3     COMPRESSION STOCKINGS 1 each daily 2 each 0     diclofenac (VOLTAREN) 1 % topical gel Apply 2 g topically 4 times daily       finasteride (PROSCAR) 5 MG tablet TAKE 1 TABLET EVERY DAY 90 tablet 3     glucosamine-chondroitin 500-400 MG CAPS per capsule Take 2 capsules by mouth daily       ketotifen (ZADITOR) 0.025 % SOLN ophthalmic solution Place 1 drop into both eyes every 12 hours 1 Bottle      KLOR-CON 20 MEQ CR tablet TAKE 1 TABLET EVERY DAY 90 tablet 3     levothyroxine (SYNTHROID/LEVOTHROID) 100 MCG tablet TAKE 1 TABLET EVERY DAY 90 tablet 3     LORazepam (ATIVAN) 1 MG tablet Take 0.5-1 tablets (0.5-1 mg) by mouth every 8 hours as needed for  anxiety 30 tablet 5     Multiple Vitamins-Minerals (CENTRUM SILVER) per tablet Take 1 tablet by mouth daily       omeprazole (PRILOSEC) 20 MG DR capsule TAKE 1 CAPSULE TWICE DAILY 180 capsule 3     ORDER FOR DME Auto-CPAP:Max 9 cm H2OMin 9 cm H2O  Continuous Lifetime need and heated humidity.    1 Device 0     Polyethylene Glycol 3350 (MIRALAX PO) Take 1 packet by mouth At Bedtime        simvastatin (ZOCOR) 20 MG tablet Take 20 mg by mouth daily        sucralfate (CARAFATE) 1 GM tablet Take 1 tablet (1 g) by mouth 2 times daily 180 tablet 3     tamsulosin (FLOMAX) 0.4 MG capsule TAKE 1 CAPSULE EVERY DAY 90 capsule 3     triamcinolone (KENALOG) 0.025 % cream Apply topically 2 times daily as needed        apixaban ANTICOAGULANT (ELIQUIS) 2.5 MG tablet Take 1 tablet (2.5 mg) by mouth 2 times daily 180 tablet 3       ALLERGIES   No Known Allergies    PAST MEDICAL HISTORY:  Past Medical History:   Diagnosis Date     Acute, but ill-defined, cerebrovascular disease     NO RESIDUALS     Antiplatelet or antithrombotic long-term use      Arrhythmia     a fib     Blood in stool      Coronary atherosclerosis of unspecified type of vessel, native or graft     S/P PTCA, EF 50%     Esophageal reflux      Hydrocele, unspecified      Hypercholesterolemia      Hypersomnia with sleep apnea, unspecified      Inguinal hernia without mention of obstruction or gangrene, recurrent unilateral or unspecified      Ischemic cardiomyopathy      Major depression in complete remission (H)      Obese      Onychomycosis      JIM on CPAP      Other and unspecified hyperlipidemia      Other lymphedema     GROIN AND THIGHS     Other pancytopenia (H)      Other specified anemias      Overweight      Pacemaker      PAD (peripheral artery disease) (H)      Pancytopenia (H)      Persistent atrial fibrillation (H)     VVI PM for long pauses     Prostatic hypertrophy, benign      Pulmonary hypertension (H)      Scrotal varices      Thrombocytopenia,  unspecified (H)      Umbilical hernia without obstruction and without gangrene      Unilateral or unspecified femoral hernia without mention of obstruction or gangrene, unilateral or unspecified      Unspecified hypothyroidism      Venous stasis dermatitis of both lower extremities      Vitamin D deficiency        PAST SURGICAL HISTORY:  Past Surgical History:   Procedure Laterality Date     BONE MARROW BIOPSY, BONE SPECIMEN, NEEDLE/TROCAR N/A 4/19/2019    Procedure: BONE MARROW BIOPSY;  Surgeon: Yovani Cooley MD;  Location:  OR     CV HEART CATHETERIZATION WITH POSSIBLE INTERVENTION N/A 4/12/2019    Procedure: Heart Catheterization with possible Intervention;  Surgeon: Lev Beck MD;  Location:  HEART CARDIAC CATH LAB     CV RIGHT HEART CATH N/A 4/12/2019    Procedure: Right Heart Cath;  Surgeon: Lev Beck MD;  Location:  HEART CARDIAC CATH LAB     EYE SURGERY      CATARACT/BLEPHAROPLASTY     GENITOURINARY SURGERY      TUNA     HERNIA REPAIR      RIGHT INGUINAL     HERNIORRHAPHY INGUINAL  8/14/2012    Procedure: HERNIORRHAPHY INGUINAL;  right inguinal hernia repair;  Surgeon: Kareem Torrez MD;  Location:  [facility]     HERNIORRHAPHY UMBILICAL N/A 10/31/2017    Procedure: HERNIORRHAPHY UMBILICAL;  UMBILICAL HERNIA REPAIR WITH MESH;  Surgeon: Scar Harrington MD;  Location: [facility]     IMPLANT PACEMAKER  8/2009    single chamber     ORTHOPEDIC SURGERY      [facility] RIGHT     ORTHOPEDIC SURGERY  2010    right TKR, left TKR     SURGICAL HISTORY OF -       Thumb surgery     SURGICAL HISTORY OF -   1990s    Left total knee replacement     SURGICAL HISTORY OF -   3/11    Rig total knee replacement       FAMILY HISTORY:  Family History   Problem Relation Age of Onset     Cardiovascular Father      C.A.D. Father      Lung Cancer Sister      Unknown/Adopted Mother      Cancer - colorectal No family hx of        SOCIAL HISTORY:  Social History     Socioeconomic History     Marital status:       Spouse name: None     Number of children: None     Years of education: None     Highest education level: None   Occupational History     Occupation: Teacher, author, speaker     Employer: RETIRED   Social Needs     Financial resource strain: None     Food insecurity:     Worry: None     Inability: None     Transportation needs:     Medical: None     Non-medical: None   Tobacco Use     Smoking status: Never Smoker     Smokeless tobacco: Never Used     Tobacco comment: per encounter 2/12/07   Substance and Sexual Activity     Alcohol use: Yes     Alcohol/week: 0.0 oz     Comment: 1/month     Drug use: No     Sexual activity: Never     Partners: Female   Lifestyle     Physical activity:     Days per week: None     Minutes per session: None     Stress: None   Relationships     Social connections:     Talks on phone: None     Gets together: None     Attends Latter-day service: None     Active member of club or organization: None     Attends meetings of clubs or organizations: None     Relationship status: None     Intimate partner violence:     Fear of current or ex partner: None     Emotionally abused: None     Physically abused: None     Forced sexual activity: None   Other Topics Concern     Parent/sibling w/ CABG, MI or angioplasty before 65F 55M? No      Service Not Asked     Blood Transfusions Not Asked     Caffeine Concern No     Comment: 1-2 cups coffee a day     Occupational Exposure Not Asked     Hobby Hazards Not Asked     Sleep Concern No     Stress Concern Yes     Comment: due to wifes health condition     Weight Concern No     Special Diet No     Back Care Not Asked     Exercise Yes     Comment: states he uses his tredmill on and off     Bike Helmet Not Asked     Seat Belt Yes     Self-Exams Not Asked   Social History Narrative     None       Review of Systems:  Skin:  Positive for bruising dry skin    Eyes:  Positive for glasses cataract surgery on both eyes  ENT:  Negative      Respiratory:   "Positive for sleep apnea;CPAP;dyspnea on exertion     Cardiovascular:    Positive for;edema;lightheadedness chest heaviness when walking, edema,   Gastroenterology: Negative      Genitourinary:  Negative      Musculoskeletal:  Positive for arthritis    Neurologic:  Positive for stroke;numbness or tingling of feet right foot  Psychiatric:  Positive for sleep disturbances;anxiety;excessive stress stress due to wifes health   Heme/Lymph/Imm:  Positive for easy bruising    Endocrine:  Positive for thyroid disorder      Physical Exam:  Vitals: /70   Pulse 71   Ht 1.88 m (6' 2.02\")   Wt 87.9 kg (193 lb 12.8 oz)   BMI 24.87 kg/m       Constitutional:  cooperative;in no acute distress frail      Skin:  warm and dry to the touch bruises present   fell twice recently, once on ice, once into a fire place which he can't really explain but resulted in some bruising.    Head:  normocephalic        Eyes:  pupils equal and round        Lymph:      ENT:  no pallor or cyanosis        Neck:    JVP elevated Large V wave consistent with tricuspid regurgitation    Respiratory:  clear to auscultation;normal symmetry    bilateral basilar crackles    Cardiac: regular rhythm       systolic murmur     Paced  pulses full and equal venous stasis changes bilaterally                                 Doppler triphasic R DP, Blunted biphasic L DP on portable to auscultation.    GI:  abdomen soft   benign    Extremities and Muscular Skeletal:  no deformities, clubbing, cyanosis, erythema observed stasis pigmentation bilateral LE edema;trace;1+ 1+ trace wearing compression stockings    Neurological:  affect appropriate        Psych:  Alert and Oriented x 3    Encounter Diagnosis   Name Primary?     Acute systolic CHF (congestive heart failure) (H)        Recent Lab Results:  LIPID RESULTS:  Lab Results   Component Value Date    CHOL 92 04/25/2019    HDL 35 (L) 04/25/2019    LDL 48 04/25/2019    TRIG 45 04/25/2019    CHOLHDLRATIO 2.1 " 06/15/2015       LIVER ENZYME RESULTS:  Lab Results   Component Value Date    AST 31 04/17/2019    ALT <5 (L) 04/25/2019       CBC RESULTS:  Lab Results   Component Value Date    WBC 3.0 (L) 04/19/2019    RBC 3.00 (L) 04/19/2019    HGB 9.9 (L) 04/25/2019    HCT 29.7 (L) 04/19/2019    MCV 99 04/19/2019    MCH 33.0 04/19/2019    MCHC 33.3 04/19/2019    RDW 16.5 (H) 04/19/2019    PLT 83 (L) 04/19/2019       BMP RESULTS:  Lab Results   Component Value Date     (L) 04/25/2019    POTASSIUM 4.1 04/25/2019    CHLORIDE 103 04/25/2019    CO2 29 04/25/2019    ANIONGAP 7.1 04/25/2019     (H) 04/25/2019    BUN 20 04/25/2019    CR 1.21 04/25/2019    GFRESTIMATED 57 (L) 04/25/2019    GFRESTBLACK 68 04/25/2019    TEODORO 9.5 04/25/2019        A1C RESULTS:  No results found for: A1C    INR RESULTS:  Lab Results   Component Value Date    INR 1.79 (H) 11/20/2018    INR 1.48 (H) 03/21/2018           CC  No referring provider defined for this encounter.                Thank you for allowing me to participate in the care of your patient.  Sincerely,     WILLIS Smith CNP     [facility]         DKA (diabetic ketoacidosis)

## 2024-06-04 NOTE — CONSULT NOTE ADULT - ASSESSMENT
38 y/o F w/ PMHx of HTN who presented after found down at home unresponsive in bed with emesis on face, admitted to MICU for DKA. a1c 16.2%, started on insulin gtt. 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 Pt will need perscription for basal insulin pen (ie. Lantus Solostar pen, if not covered then ask pharmacy which brand of basal insulin is covered by their insurance plan - other brands include: Basaglar, Lantus, Tresiba, Toujeo) and bolus insulin pen (ie. Humalog Kwikpen , if not covered then ask pharmacy which brand of bolus insulin is covered by their insurance plan - other brands include: Novolog, Admelog). Dispense 5 insulin pens with 3 refills. Please send prescriptions for diabetes supplies (glucometer, test strips, lancets, alcohol swabs, insulin pen needles) - dispense #100, use as directed (3 refills).  - OUT-PATIENT FOLLOW UP: Patient should follow up with Endocrinology office ([phone]) at [address] [place] [address] [address] [place] [id number].    #HLD  - patient on atorvastatin mg  - goal LDL in diabetes mellitus is <70    #HTN  - patient on   - goal BP in diabetes mellitus is <130/80  - defer to primary team for management    ****ADD Problems note**** 40 y/o F w/ PMHx of HTN who presented after found down at home unresponsive in bed with emesis on face, admitted to MICU for DKA. a1c 16.2%, started on insulin gtt. 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%)  Home meds: none  - does not meet criteria for transitioning (AG <12, Bicarb > 18, FS < 200-250)  - would recommend continuing insulin drip per DKA protocol and keep patient NPO while on insulin drip  - add dextrose to fluids when FS < 250  - check BMP/VBG/BHB q4h  - given patient with acute DKA - should check Latent autoimmune diabetes of adults (KELLY) antibodies: zinc transporter antibody, glutamic acid decarboxylase antibody, IA-2 antibody  - Nutrition consult and diabetes/insulin pen teaching prior to discharge given this is NEW diabetes diagnosis  - DISCHARGE RECOMMENDATIONS: basal-bolus regimen, doses TBD. Pt will need perscription for basal insulin pen (ie. Lantus Solostar pen, if not covered then ask pharmacy which brand of basal insulin is covered by their insurance plan - other brands include: Basaglar, Lantus, Tresiba, Toujeo) and bolus insulin pen (ie. Humalog Kwikpen , if not covered then ask pharmacy which brand of bolus insulin is covered by their insurance plan - other brands include: Novolog, Admelog). Dispense 5 insulin pens with 3 refills. Please send prescriptions for diabetes supplies (glucometer, test strips, lancets, alcohol swabs, insulin pen needles) - dispense #100, use as directed (3 refills).  - OUT-PATIENT FOLLOW UP: Patient should follow up with Endocrinology office ([phone]) at [address] [place] [address] Suite [facility], [place] [address].    #HLD  LDL 61  - goal LDL in T2DM is <70    #HTN  - goal BP in T2DM is <130/80  - defer to primary team    Twan Reyes MD  Endocrine Fellow  Can be reached via [employer] teams.    For follow up questions, discharge recommendations, or new consults, please email [email or web address] ([email or web address]) or [email or web address] ([facility]) or call answering service at [phone] (weekdays); [phone] (nights/weekends).  For emergiencies please page fellow on call.   38 y/o F w/ PMHx of HTN who presented after found down at home unresponsive in bed with emesis on face, admitted to MICU for DKA. a1c 16.2%, started on insulin gtt. 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  -Recommend CGM discuss further with patient when able.  - DISCHARGE RECOMMENDATIONS: basal-bolus regimen, doses TBD. Pt will need prescription for basal insulin pen (ie. Lantus Solostar pen, if not covered then ask pharmacy which brand of basal insulin is covered by their insurance plan - other brands include: Basaglar, Lantus, Tresiba, Toujeo) and bolus insulin pen (ie. Humalog Kwikpen , if not covered then ask pharmacy which brand of bolus insulin is covered by their insurance plan - other brands include: Novolog, Admelog). Dispense 5 insulin pens with 3 refills. Please send prescriptions for diabetes supplies (glucometer, test strips, lancets, alcohol swabs, insulin pen needles) - dispense #100, use as directed (3 refills).  - OUT-PATIENT FOLLOW UP: Patient should follow up with Endocrinology office ([phone]) at [address] [place] [address] Suite [facility], [place] [address].    #HLD  LDL 61  - goal LDL in T2DM is <70    #HTN  - goal BP in T2DM is <130/80  - defer to primary team    Twan Reyes MD  Endocrine Fellow  Can be reached via [employer] teams.    For follow up questions, discharge recommendations, or new consults, please email [email or web address] ([email or web address]) or [email or web address] ([facility]) or call answering service at [phone] (weekdays); [phone] (nights/weekends).  For emergiencies please page fellow on call.

## 2024-06-04 NOTE — ED ADULT NURSE REASSESSMENT NOTE - NS ED NURSE REASSESS COMMENT FT1
Mobile Critical Care RN: Pt received in rm 25 in ED, lethargic, eyes open at times, intermittently follows comands (squeezes hand), does not respond verbally to questions. Pt received on insulin gtt at 8 units/hr, glucose monitored hourly per finger stick, DKA protocol followed. Report given to MICU by ED RN, pt transported to MICU without incident.

## 2024-06-04 NOTE — PROCEDURE NOTE - NSINDICATIONS_GEN_A_CORE
antibiotic therapy/emergency venous access/fluid administration
antibiotic therapy/emergency venous access/fluid administration

## 2024-06-04 NOTE — CHART NOTE - NSCHARTNOTEFT_GEN_A_CORE
Critically ill patient requiring ABG to determine respiratory status.  This was an emergent procedure.  Patients radial artery was palpated/ visualized with US and cleaned with alcohol pad.   23g x 3/4" x 12" butterfly needed was inserted into the left/right radial artery with pulsatile flash.  One attempt was performed.  3cc of blood was obtained from patient.  Gauze pad was placed over site, needle withdrawn and firm pressure was held until complete hemostasis was achieved and band-aid was applied.  Patient tolerated procedure well with no complications.        [facility] RPA C [id number]

## 2024-06-04 NOTE — PROGRESS NOTE ADULT - ASSESSMENT
This is a 40 y/o F with PMHx who was found down at home w/ nausea on face, admitted to MICU for DKA mgmt.     =======NEURO=========  #Encephalopathy  - likely metabolic 2/2 DKA, also possibly in setting of infection  - reassess when blood glucose and volume status improved    =======CARDIOVASCULAR=====  #Tachycardia  - likely in setting of DKA vs active infection vs dehydration   - EKG    =======RESPIRATORY=========  #AHRF  - 2/2 to possible aspiration event   - CXR limited view but clear lungs  - will need more info once mental status improves   - wean o2 as tolerated     =======GASTROINTESTINAL====  - NPO     =======GENITOURINARY=======  #OSMAN  - Admission Cr 4.57, unclear baseline   - given fluid boluses in ED  - Possibly pre-renal ISO volume depletion and DKA   - urine lytes   - ctm Cr  - avoid nephrotoxic agents  - renally dose meds  - strict I's and O's    =======ID===================  #Sepsis   - WBC 16.9, T 100.4,    - U/A negative, limited CXR w/o focal consolidation, RVP negative   - s/p vanc/zosyn in ED   - continue vanc, zosyn for now (6/3- )   - f/u urine cx, blood culture     =======HEME================  #Normocytic anemia  - no active bleed  - monitor CBC    =======ENDOCRINE============  #DKA  - per EMS, hx of DM (unclear 1 vs 2)   - unclear etiology, will need more collateral   - A1c  - Started on insulin drip at 8 units per hour, adjust per hospital protocol  - D5 when glucose <200  - NPO   - BMP/VBG q4 to monitor anion gap/acidosis, replete K as needed     =======PROPHYLAXIS===========  -Access: PIV  -Code Status: Full  -Disposition: pending clinical course This is a 40 y/o F with PMHx who was found down at home w/ nausea on face, admitted to MICU for DKA mgmt.     =======NEURO=========  #Encephalopathy  - likely metabolic 2/2 DKA, also possibly in setting of infection.   - reassess when blood glucose and volume status improves  -A&O x 4 at baseline   - CTH      =======CARDIOVASCULAR=====  #Tachycardia  - likely in setting of DKA vs active infection vs dehydration   - EKG    =======RESPIRATORY=========  #AHRF  - 2/2 to possible aspiration event   - CXR limited view but clear lungs  - will need more info once mental status improves   - wean o2 as tolerated     =======GASTROINTESTINAL====  - NPO     =======GENITOURINARY=======  #OSMAN  - Admission Cr 4.57, unclear baseline   - given fluid boluses in ED  - Possibly pre-renal ISO volume depletion and DKA   - urine lytes   - ctm Cr  - avoid nephrotoxic agents  - renally dose meds  - strict I's and O's    #Hypernatremia   -     =======ID===================  #Sepsis   - WBC 16.9, T 100.4,    - U/A negative, limited CXR w/o focal consolidation, RVP negative   - s/p vanc/zosyn in ED   - continue vanc, zosyn for now (6/3- )   - f/u urine cx, blood culture     =======HEME================  #Normocytic anemia  - no active bleed  - monitor CBC    =======ENDOCRINE============  #DKA  - per ,   - unclear etiology, will need more collateral   - A1c  - continue insulin drip per protocol  - D5 when glucose <200  - NPO   - BMP/VBG q4 to monitor anion gap/acidosis, replete K as needed     =======PROPHYLAXIS===========  -Access: PIV  -Code Status: Full  -Disposition: pending clinical course This is a 38 y/o F with PMHx who was found down at home w/ nausea on face, admitted to MICU for DKA mgmt.     =======NEURO=========  #Encephalopathy  - likely metabolic 2/2 DKA, also possibly in setting of infection  - reassess when blood glucose and volume status improves  - A&O x 4 at baseline   - CTH given continued AMS     =======CARDIOVASCULAR=====  #Tachycardia  - likely in setting of DKA vs active infection vs dehydration   - trop neg x1  - EKG    =======RESPIRATORY=========  #AHRF  - 2/2 to possible aspiration event?  - CXR limited view but clear lungs  - wean o2 as tolerated     =======GASTROINTESTINAL====  - NPO     =======GENITOURINARY=======  #OSMAN  - Admission Cr 4.57 -> 2.5, unclear baseline   - given fluid boluses in ED  - Possibly pre-renal ISO volume depletion and DKA   - urine lytes   - ctm Cr  - avoid nephrotoxic agents  - renally dose meds  - strict I's and O's    #Hypernatremia   - Na ~165 after correction of glucose  - 2/2 to dehydration, no PO intake  - continue D5 LR mIVF for now   - continue switch to 1/2NS or place NGT for free water flushes (pt w/ agitation)     =======ID===================  #Sepsis   - WBC 16.9, T 100.4,    - U/A negative, limited CXR w/o focal consolidation, RVP negative   - s/p vanc/zosyn in ED   - continue vanc, zosyn for now (6/3- )   - f/u urine cx, blood culture   - consider LP if pt w/o improvement in mental status     =======HEME================  #Normocytic anemia  - no active bleed  - monitor CBC    =======ENDOCRINE============  #DKA  #new diabetes  - per , no dx of DM    - A1c 16.2   - continue insulin drip per protocol  - FS q1h   - NPO   - BMP/VBG q4 to monitor anion gap/acidosis, replete K as needed     =======PROPHYLAXIS===========  -Access: PIV  -Code Status: Full  -Disposition: pending clinical course

## 2024-06-04 NOTE — PROVIDER CONTACT NOTE (CRITICAL VALUE NOTIFICATION) - ACTION/TREATMENT ORDERED:
Awaiting NGT placement by medical team, checking FS hourly and adjusting Insulin drip accordingly and making team aware

## 2024-06-04 NOTE — CONSULT NOTE ADULT - SUBJECTIVE AND OBJECTIVE BOX
Twan Reyes MD   |   PGY-4  Endocrinology Fellow  Available on [employer] Teams    HPI:  This is a 40 y/o F with PMHx of DM who presented from EMS after found unresponsive w/ emesis on face. Patient unable to provide any history due to AMS. No family at bedside or contact info in chart to provide further collateral.     Per ED note, pt was given 1L of fluids, started on NRB. Patient barely responsive to sternal rub. Deferred intubation as patient satting 100%.     In the ED, T 100.4, , /60, RR 10, 99% on NRB -> weaned to 6L NC. Labs significant for WBC 16.9, AG 40, Cr 4.57, glucose >1500, BHB >9, pH 7.11, U/A w/ 40 ketones. s/p vanc 1g x1, zosyn x1, 3L NS bolus, calcium gluconate 2g x1. MICU consulted for DKA mgmt.   (03 Jun 2024 19:09)      Endocrine History:  40 y/o F w/ PMHx of HTN who presented after found down at home unresponsive in bed with emesis on face, admitted to MICU for DKA. a1c 16.2%, started on insulin gtt. Reason for endocrine consult: DKA, blood glucose >1500 on admission, new diabetes      Spoke with brother as patient is AOx0. History is limited, he denies her ever being diagnosed with diabetes. He reports Grandmother and Aunt have T2DM. Brother denies hx of CVA or MI and does not take any medications   --Statin: not on   --ACE/ARB: not on   --Insurance: Uninsured?   --Follow up/any appts?   --Inpatient Diet? NPO   --Inpatient diabetes regimen? Insulin infusion   --On steroids inpatient? no   --BMI:         weight: 81.8kg   --GFR? 24     PAST MEDICAL & SURGICAL HISTORY:  Diabetes          MEDICATIONS  (STANDING):  chlorhexidine 2% Cloths 1 Application(s) Topical daily  dextrose 5% + lactated ringers with potassium chloride 20 mEq/L 1000 milliLiter(s) (150 mL/Hr) IV Continuous <Continuous>  heparin   Injectable 5000 Unit(s) SubCutaneous every 8 hours  insulin regular Infusion 9 Unit(s)/Hr (9 mL/Hr) IV Continuous <Continuous>  piperacillin/tazobactam IVPB.. 3.375 Gram(s) IV Intermittent every 8 hours  potassium chloride  10 mEq/100 mL IVPB 10 milliEquivalent(s) IV Intermittent every 1 hour    MEDICATIONS  (PRN):      Allergies    No Known Allergies    Intolerances      Review of Systems:  Unable to assess ROS d/t AMS.    ===================PHYSICAL EXAM=======================  VITALS: T(C): 36.7 (06-04-24 @ 12:00)  T(F): 98.1 (06-04-24 @ 12:00), Max: 100.8 (06-04-24 @ 00:35)  HR: 123 (06-04-24 @ 15:00) (114 - 148)  BP: 138/63 (06-04-24 @ 15:00) (100/60 - 181/101)  RR:  (10 - 30)  SpO2:  (98% - 100%)  Wt(kg): --  GENERAL: NAD  EYES: No proptosis, no lid lag, anicteric  THYROID: Normal size, no palpable nodules  RESPIRATORY: Clear to auscultation bilaterally  CARDIOVASCULAR: Regular rate and rhythm  GI: Soft, nontender, non distended  EXT: b/l feet without wounds; 2+ pulses  PSYCH: Alert and oriented x 0  ======================================================    POCT Blood Glucose.: 289 mg/dL (06-04-24 @ 15:01)  POCT Blood Glucose.: 326 mg/dL (06-04-24 @ 14:16)  POCT Blood Glucose.: 345 mg/dL (06-04-24 @ 13:03)  POCT Blood Glucose.: 424 mg/dL (06-04-24 @ 12:03)  POCT Blood Glucose.: 459 mg/dL (06-04-24 @ 11:02)  POCT Blood Glucose.: 406 mg/dL (06-04-24 @ 10:02)  POCT Blood Glucose.: 349 mg/dL (06-04-24 @ 09:06)  POCT Blood Glucose.: 343 mg/dL (06-04-24 @ 08:07)  POCT Blood Glucose.: 288 mg/dL (06-04-24 @ 07:06)  POCT Blood Glucose.: 320 mg/dL (06-04-24 @ 06:22)  POCT Blood Glucose.: 350 mg/dL (06-04-24 @ 04:59)  POCT Blood Glucose.: 405 mg/dL (06-04-24 @ 04:01)  POCT Blood Glucose.: 444 mg/dL (06-04-24 @ 03:00)  POCT Blood Glucose.: 515 mg/dL (06-04-24 @ 02:00)  POCT Blood Glucose.: >600 mg/dL (06-04-24 @ 01:06)  POCT Blood Glucose.: 573 mg/dL (06-04-24 @ 00:20)  POCT Blood Glucose.: >600 mg/dL (06-03-24 @ 22:51)  POCT Blood Glucose.: >600 mg/dL (06-03-24 @ 20:59)  POCT Blood Glucose.: >600 mg/dL (06-03-24 @ 19:56)  POCT Blood Glucose.: >600 mg/dL (06-03-24 @ 18:37)  POCT Blood Glucose.: >600 mg/dL (06-03-24 @ 16:49)  POCT Blood Glucose.: >600 mg/dL (06-03-24 @ 16:21)                            10.3   19.82 )-----------( 423 ( 04 Jun 2024 01:40 )             32.7       06-04    159<H>  |  121<H>  |  41<H>  ----------------------------<  346<H>  2.7<LL>   |  22  |  2.09<H>    eGFR: 30<L>    Ca    9.0      06-04  Mg     2.20     06-04  Phos  2.1     06-04    TPro  6.9  /  Alb  3.2<L>  /  TBili  0.2  /  DBili  x   /  AST  51<H>  /  ALT  16  /  AlkPhos  114  06-04      Thyroid Function Tests:      A1C with Estimated Average Glucose Result: 16.2 % (06-04-24 @ 01:40)      06-04 Chol 152 Direct LDL -- LDL calculated 61 HDL 34<L> Trig 284<H>    Radiology:

## 2024-06-05 LAB
ADD ON TEST-SPECIMEN IN LAB: SIGNIFICANT CHANGE UP
ALBUMIN SERPL ELPH-MCNC: 3.1 G/DL — LOW (ref 3.3–5)
ALBUMIN SERPL ELPH-MCNC: 3.2 G/DL — LOW (ref 3.3–5)
ALBUMIN SERPL ELPH-MCNC: 3.3 G/DL — SIGNIFICANT CHANGE UP (ref 3.3–5)
ALP SERPL-CCNC: 113 U/L — SIGNIFICANT CHANGE UP (ref 40–120)
ALP SERPL-CCNC: 116 U/L — SIGNIFICANT CHANGE UP (ref 40–120)
ALP SERPL-CCNC: 119 U/L — SIGNIFICANT CHANGE UP (ref 40–120)
ALP SERPL-CCNC: 119 U/L — SIGNIFICANT CHANGE UP (ref 40–120)
ALP SERPL-CCNC: 121 U/L — HIGH (ref 40–120)
ALT FLD-CCNC: 18 U/L — SIGNIFICANT CHANGE UP (ref 4–33)
ALT FLD-CCNC: 19 U/L — SIGNIFICANT CHANGE UP (ref 4–33)
ALT FLD-CCNC: 20 U/L — SIGNIFICANT CHANGE UP (ref 4–33)
ANION GAP SERPL CALC-SCNC: 14 MMOL/L — SIGNIFICANT CHANGE UP (ref 7–14)
ANION GAP SERPL CALC-SCNC: 14 MMOL/L — SIGNIFICANT CHANGE UP (ref 7–14)
ANION GAP SERPL CALC-SCNC: 16 MMOL/L — HIGH (ref 7–14)
ANION GAP SERPL CALC-SCNC: 16 MMOL/L — HIGH (ref 7–14)
ANION GAP SERPL CALC-SCNC: 17 MMOL/L — HIGH (ref 7–14)
APTT BLD: 21.9 SEC — LOW (ref 24.5–35.6)
AST SERPL-CCNC: 39 U/L — HIGH (ref 4–32)
AST SERPL-CCNC: 46 U/L — HIGH (ref 4–32)
AST SERPL-CCNC: 47 U/L — HIGH (ref 4–32)
AST SERPL-CCNC: 52 U/L — HIGH (ref 4–32)
AST SERPL-CCNC: 54 U/L — HIGH (ref 4–32)
B-OH-BUTYR SERPL-SCNC: 0.4 MMOL/L — SIGNIFICANT CHANGE UP (ref 0–0.4)
B-OH-BUTYR SERPL-SCNC: 0.5 MMOL/L — HIGH (ref 0–0.4)
B-OH-BUTYR SERPL-SCNC: 2.3 MMOL/L — HIGH (ref 0–0.4)
B-OH-BUTYR SERPL-SCNC: <0 MMOL/L — SIGNIFICANT CHANGE UP (ref 0–0.4)
BASE EXCESS BLDV CALC-SCNC: 1.2 MMOL/L — SIGNIFICANT CHANGE UP (ref -2–3)
BASE EXCESS BLDV CALC-SCNC: 2.2 MMOL/L — SIGNIFICANT CHANGE UP (ref -2–3)
BASE EXCESS BLDV CALC-SCNC: 2.9 MMOL/L — SIGNIFICANT CHANGE UP (ref -2–3)
BASE EXCESS BLDV CALC-SCNC: 3.6 MMOL/L — HIGH (ref -2–3)
BASE EXCESS BLDV CALC-SCNC: 3.8 MMOL/L — HIGH (ref -2–3)
BASOPHILS # BLD AUTO: 0.04 K/UL — SIGNIFICANT CHANGE UP (ref 0–0.2)
BASOPHILS NFR BLD AUTO: 0.2 % — SIGNIFICANT CHANGE UP (ref 0–2)
BILIRUB SERPL-MCNC: 0.4 MG/DL — SIGNIFICANT CHANGE UP (ref 0.2–1.2)
BILIRUB SERPL-MCNC: 0.5 MG/DL — SIGNIFICANT CHANGE UP (ref 0.2–1.2)
BLD GP AB SCN SERPL QL: NEGATIVE — SIGNIFICANT CHANGE UP
BLOOD GAS VENOUS COMPREHENSIVE RESULT: SIGNIFICANT CHANGE UP
BUN SERPL-MCNC: 15 MG/DL — SIGNIFICANT CHANGE UP (ref 7–23)
BUN SERPL-MCNC: 15 MG/DL — SIGNIFICANT CHANGE UP (ref 7–23)
BUN SERPL-MCNC: 18 MG/DL — SIGNIFICANT CHANGE UP (ref 7–23)
BUN SERPL-MCNC: 23 MG/DL — SIGNIFICANT CHANGE UP (ref 7–23)
BUN SERPL-MCNC: 29 MG/DL — HIGH (ref 7–23)
CALCIUM SERPL-MCNC: 8.6 MG/DL — SIGNIFICANT CHANGE UP (ref 8.4–10.5)
CALCIUM SERPL-MCNC: 8.9 MG/DL — SIGNIFICANT CHANGE UP (ref 8.4–10.5)
CALCIUM SERPL-MCNC: 8.9 MG/DL — SIGNIFICANT CHANGE UP (ref 8.4–10.5)
CALCIUM SERPL-MCNC: 9 MG/DL — SIGNIFICANT CHANGE UP (ref 8.4–10.5)
CALCIUM SERPL-MCNC: 9.2 MG/DL — SIGNIFICANT CHANGE UP (ref 8.4–10.5)
CHLORIDE BLDV-SCNC: 107 MMOL/L — SIGNIFICANT CHANGE UP (ref 96–108)
CHLORIDE BLDV-SCNC: 113 MMOL/L — HIGH (ref 96–108)
CHLORIDE BLDV-SCNC: 117 MMOL/L — HIGH (ref 96–108)
CHLORIDE BLDV-SCNC: 120 MMOL/L — HIGH (ref 96–108)
CHLORIDE BLDV-SCNC: 122 MMOL/L — HIGH (ref 96–108)
CHLORIDE SERPL-SCNC: 103 MMOL/L — SIGNIFICANT CHANGE UP (ref 98–107)
CHLORIDE SERPL-SCNC: 112 MMOL/L — HIGH (ref 98–107)
CHLORIDE SERPL-SCNC: 115 MMOL/L — HIGH (ref 98–107)
CHLORIDE SERPL-SCNC: 118 MMOL/L — HIGH (ref 98–107)
CHLORIDE SERPL-SCNC: 118 MMOL/L — HIGH (ref 98–107)
CO2 BLDV-SCNC: 28.3 MMOL/L — HIGH (ref 22–26)
CO2 BLDV-SCNC: 28.6 MMOL/L — HIGH (ref 22–26)
CO2 BLDV-SCNC: 28.9 MMOL/L — HIGH (ref 22–26)
CO2 BLDV-SCNC: 28.9 MMOL/L — HIGH (ref 22–26)
CO2 BLDV-SCNC: 29.7 MMOL/L — HIGH (ref 22–26)
CO2 SERPL-SCNC: 23 MMOL/L — SIGNIFICANT CHANGE UP (ref 22–31)
CO2 SERPL-SCNC: 25 MMOL/L — SIGNIFICANT CHANGE UP (ref 22–31)
CO2 SERPL-SCNC: 25 MMOL/L — SIGNIFICANT CHANGE UP (ref 22–31)
CREAT SERPL-MCNC: 1.34 MG/DL — HIGH (ref 0.5–1.3)
CREAT SERPL-MCNC: 1.34 MG/DL — HIGH (ref 0.5–1.3)
CREAT SERPL-MCNC: 1.49 MG/DL — HIGH (ref 0.5–1.3)
CREAT SERPL-MCNC: 1.58 MG/DL — HIGH (ref 0.5–1.3)
CREAT SERPL-MCNC: 1.78 MG/DL — HIGH (ref 0.5–1.3)
EGFR: 37 ML/MIN/1.73M2 — LOW
EGFR: 42 ML/MIN/1.73M2 — LOW
EGFR: 45 ML/MIN/1.73M2 — LOW
EGFR: 51 ML/MIN/1.73M2 — LOW
EGFR: 51 ML/MIN/1.73M2 — LOW
EOSINOPHIL # BLD AUTO: 0.09 K/UL — SIGNIFICANT CHANGE UP (ref 0–0.5)
EOSINOPHIL NFR BLD AUTO: 0.4 % — SIGNIFICANT CHANGE UP (ref 0–6)
GAS PNL BLDV: 141 MMOL/L — SIGNIFICANT CHANGE UP (ref 136–145)
GAS PNL BLDV: 147 MMOL/L — HIGH (ref 136–145)
GAS PNL BLDV: 152 MMOL/L — HIGH (ref 136–145)
GAS PNL BLDV: 153 MMOL/L — HIGH (ref 136–145)
GAS PNL BLDV: 156 MMOL/L — HIGH (ref 136–145)
GAS PNL BLDV: SIGNIFICANT CHANGE UP
GLUCOSE BLDC GLUCOMTR-MCNC: 138 MG/DL — HIGH (ref 70–99)
GLUCOSE BLDC GLUCOMTR-MCNC: 153 MG/DL — HIGH (ref 70–99)
GLUCOSE BLDC GLUCOMTR-MCNC: 161 MG/DL — HIGH (ref 70–99)
GLUCOSE BLDC GLUCOMTR-MCNC: 173 MG/DL — HIGH (ref 70–99)
GLUCOSE BLDC GLUCOMTR-MCNC: 183 MG/DL — HIGH (ref 70–99)
GLUCOSE BLDC GLUCOMTR-MCNC: 193 MG/DL — HIGH (ref 70–99)
GLUCOSE BLDC GLUCOMTR-MCNC: 196 MG/DL — HIGH (ref 70–99)
GLUCOSE BLDC GLUCOMTR-MCNC: 228 MG/DL — HIGH (ref 70–99)
GLUCOSE BLDC GLUCOMTR-MCNC: 230 MG/DL — HIGH (ref 70–99)
GLUCOSE BLDC GLUCOMTR-MCNC: 233 MG/DL — HIGH (ref 70–99)
GLUCOSE BLDC GLUCOMTR-MCNC: 234 MG/DL — HIGH (ref 70–99)
GLUCOSE BLDC GLUCOMTR-MCNC: 238 MG/DL — HIGH (ref 70–99)
GLUCOSE BLDC GLUCOMTR-MCNC: 238 MG/DL — HIGH (ref 70–99)
GLUCOSE BLDC GLUCOMTR-MCNC: 241 MG/DL — HIGH (ref 70–99)
GLUCOSE BLDC GLUCOMTR-MCNC: 251 MG/DL — HIGH (ref 70–99)
GLUCOSE BLDC GLUCOMTR-MCNC: 254 MG/DL — HIGH (ref 70–99)
GLUCOSE BLDC GLUCOMTR-MCNC: 263 MG/DL — HIGH (ref 70–99)
GLUCOSE BLDC GLUCOMTR-MCNC: 274 MG/DL — HIGH (ref 70–99)
GLUCOSE BLDC GLUCOMTR-MCNC: 316 MG/DL — HIGH (ref 70–99)
GLUCOSE BLDC GLUCOMTR-MCNC: 372 MG/DL — HIGH (ref 70–99)
GLUCOSE BLDC GLUCOMTR-MCNC: 416 MG/DL — HIGH (ref 70–99)
GLUCOSE BLDV-MCNC: 156 MG/DL — HIGH (ref 70–99)
GLUCOSE BLDV-MCNC: 219 MG/DL — HIGH (ref 70–99)
GLUCOSE BLDV-MCNC: 250 MG/DL — HIGH (ref 70–99)
GLUCOSE BLDV-MCNC: 253 MG/DL — HIGH (ref 70–99)
GLUCOSE BLDV-MCNC: 437 MG/DL — HIGH (ref 70–99)
GLUCOSE SERPL-MCNC: 153 MG/DL — HIGH (ref 70–99)
GLUCOSE SERPL-MCNC: 225 MG/DL — HIGH (ref 70–99)
GLUCOSE SERPL-MCNC: 232 MG/DL — HIGH (ref 70–99)
GLUCOSE SERPL-MCNC: 250 MG/DL — HIGH (ref 70–99)
GLUCOSE SERPL-MCNC: 419 MG/DL — HIGH (ref 70–99)
HCO3 BLDV-SCNC: 27 MMOL/L — SIGNIFICANT CHANGE UP (ref 22–29)
HCO3 BLDV-SCNC: 27 MMOL/L — SIGNIFICANT CHANGE UP (ref 22–29)
HCO3 BLDV-SCNC: 28 MMOL/L — SIGNIFICANT CHANGE UP (ref 22–29)
HCT VFR BLD CALC: 30.2 % — LOW (ref 34.5–45)
HCT VFR BLDA CALC: 28 % — LOW (ref 34.5–46.5)
HCT VFR BLDA CALC: 29 % — LOW (ref 34.5–46.5)
HCT VFR BLDA CALC: 29 % — LOW (ref 34.5–46.5)
HCT VFR BLDA CALC: 30 % — LOW (ref 34.5–46.5)
HCT VFR BLDA CALC: 31 % — LOW (ref 34.5–46.5)
HGB BLD CALC-MCNC: 10.1 G/DL — LOW (ref 11.7–16.1)
HGB BLD CALC-MCNC: 10.2 G/DL — LOW (ref 11.7–16.1)
HGB BLD CALC-MCNC: 9.2 G/DL — LOW (ref 11.7–16.1)
HGB BLD CALC-MCNC: 9.6 G/DL — LOW (ref 11.7–16.1)
HGB BLD CALC-MCNC: 9.7 G/DL — LOW (ref 11.7–16.1)
HGB BLD-MCNC: 9.8 G/DL — LOW (ref 11.5–15.5)
IANC: 16.62 K/UL — HIGH (ref 1.8–7.4)
IMM GRANULOCYTES NFR BLD AUTO: 0.9 % — SIGNIFICANT CHANGE UP (ref 0–0.9)
INR BLD: 1.15 RATIO — SIGNIFICANT CHANGE UP (ref 0.85–1.18)
LACTATE BLDV-MCNC: 1.9 MMOL/L — SIGNIFICANT CHANGE UP (ref 0.5–2)
LACTATE BLDV-MCNC: 2.2 MMOL/L — HIGH (ref 0.5–2)
LACTATE BLDV-MCNC: 2.7 MMOL/L — HIGH (ref 0.5–2)
LACTATE BLDV-MCNC: 2.8 MMOL/L — HIGH (ref 0.5–2)
LACTATE BLDV-MCNC: 3.5 MMOL/L — HIGH (ref 0.5–2)
LYMPHOCYTES # BLD AUTO: 1.88 K/UL — SIGNIFICANT CHANGE UP (ref 1–3.3)
LYMPHOCYTES # BLD AUTO: 9.3 % — LOW (ref 13–44)
MAGNESIUM SERPL-MCNC: 1.7 MG/DL — SIGNIFICANT CHANGE UP (ref 1.6–2.6)
MAGNESIUM SERPL-MCNC: 1.8 MG/DL — SIGNIFICANT CHANGE UP (ref 1.6–2.6)
MAGNESIUM SERPL-MCNC: 1.9 MG/DL — SIGNIFICANT CHANGE UP (ref 1.6–2.6)
MAGNESIUM SERPL-MCNC: 2.2 MG/DL — SIGNIFICANT CHANGE UP (ref 1.6–2.6)
MAGNESIUM SERPL-MCNC: 2.5 MG/DL — SIGNIFICANT CHANGE UP (ref 1.6–2.6)
MCHC RBC-ENTMCNC: 24 PG — LOW (ref 27–34)
MCHC RBC-ENTMCNC: 32.5 GM/DL — SIGNIFICANT CHANGE UP (ref 32–36)
MCV RBC AUTO: 73.8 FL — LOW (ref 80–100)
MONOCYTES # BLD AUTO: 1.31 K/UL — HIGH (ref 0–0.9)
MONOCYTES NFR BLD AUTO: 6.5 % — SIGNIFICANT CHANGE UP (ref 2–14)
NEUTROPHILS # BLD AUTO: 16.62 K/UL — HIGH (ref 1.8–7.4)
NEUTROPHILS NFR BLD AUTO: 82.7 % — HIGH (ref 43–77)
NRBC # BLD: 0 /100 WBCS — SIGNIFICANT CHANGE UP (ref 0–0)
NRBC # FLD: 0.03 K/UL — HIGH (ref 0–0)
PCO2 BLDV: 38 MMHG — LOW (ref 39–52)
PCO2 BLDV: 39 MMHG — SIGNIFICANT CHANGE UP (ref 39–52)
PCO2 BLDV: 39 MMHG — SIGNIFICANT CHANGE UP (ref 39–52)
PCO2 BLDV: 48 MMHG — SIGNIFICANT CHANGE UP (ref 39–52)
PCO2 BLDV: 50 MMHG — SIGNIFICANT CHANGE UP (ref 39–52)
PH BLDV: 7.36 — SIGNIFICANT CHANGE UP (ref 7.32–7.43)
PH BLDV: 7.36 — SIGNIFICANT CHANGE UP (ref 7.32–7.43)
PH BLDV: 7.45 — HIGH (ref 7.32–7.43)
PH BLDV: 7.46 — HIGH (ref 7.32–7.43)
PH BLDV: 7.47 — HIGH (ref 7.32–7.43)
PHOSPHATE SERPL-MCNC: 2.3 MG/DL — LOW (ref 2.5–4.5)
PHOSPHATE SERPL-MCNC: 2.6 MG/DL — SIGNIFICANT CHANGE UP (ref 2.5–4.5)
PHOSPHATE SERPL-MCNC: 2.8 MG/DL — SIGNIFICANT CHANGE UP (ref 2.5–4.5)
PHOSPHATE SERPL-MCNC: 3 MG/DL — SIGNIFICANT CHANGE UP (ref 2.5–4.5)
PHOSPHATE SERPL-MCNC: 3.6 MG/DL — SIGNIFICANT CHANGE UP (ref 2.5–4.5)
PLATELET # BLD AUTO: 304 K/UL — SIGNIFICANT CHANGE UP (ref 150–400)
PO2 BLDV: 28 MMHG — SIGNIFICANT CHANGE UP (ref 25–45)
PO2 BLDV: 37 MMHG — SIGNIFICANT CHANGE UP (ref 25–45)
PO2 BLDV: 44 MMHG — SIGNIFICANT CHANGE UP (ref 25–45)
PO2 BLDV: 50 MMHG — HIGH (ref 25–45)
PO2 BLDV: 65 MMHG — HIGH (ref 25–45)
POTASSIUM BLDV-SCNC: 3.1 MMOL/L — LOW (ref 3.5–5.1)
POTASSIUM BLDV-SCNC: 3.5 MMOL/L — SIGNIFICANT CHANGE UP (ref 3.5–5.1)
POTASSIUM BLDV-SCNC: 3.5 MMOL/L — SIGNIFICANT CHANGE UP (ref 3.5–5.1)
POTASSIUM BLDV-SCNC: 3.7 MMOL/L — SIGNIFICANT CHANGE UP (ref 3.5–5.1)
POTASSIUM BLDV-SCNC: 4.1 MMOL/L — SIGNIFICANT CHANGE UP (ref 3.5–5.1)
POTASSIUM SERPL-MCNC: 3.3 MMOL/L — LOW (ref 3.5–5.3)
POTASSIUM SERPL-MCNC: 3.3 MMOL/L — LOW (ref 3.5–5.3)
POTASSIUM SERPL-MCNC: 3.6 MMOL/L — SIGNIFICANT CHANGE UP (ref 3.5–5.3)
POTASSIUM SERPL-MCNC: 3.7 MMOL/L — SIGNIFICANT CHANGE UP (ref 3.5–5.3)
POTASSIUM SERPL-MCNC: 3.9 MMOL/L — SIGNIFICANT CHANGE UP (ref 3.5–5.3)
POTASSIUM SERPL-SCNC: 3.3 MMOL/L — LOW (ref 3.5–5.3)
POTASSIUM SERPL-SCNC: 3.3 MMOL/L — LOW (ref 3.5–5.3)
POTASSIUM SERPL-SCNC: 3.6 MMOL/L — SIGNIFICANT CHANGE UP (ref 3.5–5.3)
POTASSIUM SERPL-SCNC: 3.7 MMOL/L — SIGNIFICANT CHANGE UP (ref 3.5–5.3)
POTASSIUM SERPL-SCNC: 3.9 MMOL/L — SIGNIFICANT CHANGE UP (ref 3.5–5.3)
PROT SERPL-MCNC: 6.4 G/DL — SIGNIFICANT CHANGE UP (ref 6–8.3)
PROT SERPL-MCNC: 6.8 G/DL — SIGNIFICANT CHANGE UP (ref 6–8.3)
PROT SERPL-MCNC: 7.2 G/DL — SIGNIFICANT CHANGE UP (ref 6–8.3)
PROT SERPL-MCNC: 7.3 G/DL — SIGNIFICANT CHANGE UP (ref 6–8.3)
PROT SERPL-MCNC: 7.4 G/DL — SIGNIFICANT CHANGE UP (ref 6–8.3)
PROTHROM AB SERPL-ACNC: 12.9 SEC — SIGNIFICANT CHANGE UP (ref 9.5–13)
RBC # BLD: 4.09 M/UL — SIGNIFICANT CHANGE UP (ref 3.8–5.2)
RBC # FLD: 17.2 % — HIGH (ref 10.3–14.5)
RH IG SCN BLD-IMP: POSITIVE — SIGNIFICANT CHANGE UP
SAO2 % BLDV: 41.8 % — LOW (ref 67–88)
SAO2 % BLDV: 59 % — LOW (ref 67–88)
SAO2 % BLDV: 76.5 % — SIGNIFICANT CHANGE UP (ref 67–88)
SAO2 % BLDV: 81.3 % — SIGNIFICANT CHANGE UP (ref 67–88)
SAO2 % BLDV: 94.7 % — HIGH (ref 67–88)
SODIUM SERPL-SCNC: 143 MMOL/L — SIGNIFICANT CHANGE UP (ref 135–145)
SODIUM SERPL-SCNC: 151 MMOL/L — HIGH (ref 135–145)
SODIUM SERPL-SCNC: 154 MMOL/L — HIGH (ref 135–145)
SODIUM SERPL-SCNC: 157 MMOL/L — HIGH (ref 135–145)
SODIUM SERPL-SCNC: 157 MMOL/L — HIGH (ref 135–145)
WBC # BLD: 20.13 K/UL — HIGH (ref 3.8–10.5)
WBC # FLD AUTO: 20.13 K/UL — HIGH (ref 3.8–10.5)

## 2024-06-05 PROCEDURE — 99232 SBSQ HOSP IP/OBS MODERATE 35: CPT

## 2024-06-05 PROCEDURE — 99291 CRITICAL CARE FIRST HOUR: CPT

## 2024-06-05 RX ORDER — SODIUM CHLORIDE 9 MG/ML
1000 INJECTION, SOLUTION INTRAVENOUS ONCE
Refills: 0 | Status: COMPLETED | OUTPATIENT
Start: 2024-06-05 | End: 2024-06-05

## 2024-06-05 RX ORDER — SODIUM CHLORIDE 9 MG/ML
1000 INJECTION, SOLUTION INTRAVENOUS
Refills: 0 | Status: DISCONTINUED | OUTPATIENT
Start: 2024-06-05 | End: 2024-06-11

## 2024-06-05 RX ORDER — POTASSIUM CHLORIDE 20 MEQ
10 PACKET (EA) ORAL
Refills: 0 | Status: COMPLETED | OUTPATIENT
Start: 2024-06-05 | End: 2024-06-05

## 2024-06-05 RX ORDER — DEXTROSE 10 % IN WATER 10 %
125 INTRAVENOUS SOLUTION INTRAVENOUS ONCE
Refills: 0 | Status: DISCONTINUED | OUTPATIENT
Start: 2024-06-05 | End: 2024-06-11

## 2024-06-05 RX ORDER — MAGNESIUM SULFATE 500 MG/ML
2 VIAL (ML) INJECTION ONCE
Refills: 0 | Status: COMPLETED | OUTPATIENT
Start: 2024-06-05 | End: 2024-06-05

## 2024-06-05 RX ORDER — INSULIN LISPRO 100/ML
VIAL (ML) SUBCUTANEOUS
Refills: 0 | Status: DISCONTINUED | OUTPATIENT
Start: 2024-06-05 | End: 2024-06-05

## 2024-06-05 RX ORDER — METOCLOPRAMIDE HCL 10 MG
10 TABLET ORAL ONCE
Refills: 0 | Status: COMPLETED | OUTPATIENT
Start: 2024-06-05 | End: 2024-06-05

## 2024-06-05 RX ORDER — SODIUM CHLORIDE 9 MG/ML
1000 INJECTION, SOLUTION INTRAVENOUS
Refills: 0 | Status: DISCONTINUED | OUTPATIENT
Start: 2024-06-05 | End: 2024-06-05

## 2024-06-05 RX ORDER — INSULIN LISPRO 100/ML
VIAL (ML) SUBCUTANEOUS AT BEDTIME
Refills: 0 | Status: DISCONTINUED | OUTPATIENT
Start: 2024-06-05 | End: 2024-06-05

## 2024-06-05 RX ORDER — SODIUM CHLORIDE 9 MG/ML
500 INJECTION, SOLUTION INTRAVENOUS ONCE
Refills: 0 | Status: COMPLETED | OUTPATIENT
Start: 2024-06-05 | End: 2024-06-05

## 2024-06-05 RX ORDER — POTASSIUM CHLORIDE 20 MEQ
40 PACKET (EA) ORAL ONCE
Refills: 0 | Status: DISCONTINUED | OUTPATIENT
Start: 2024-06-05 | End: 2024-06-05

## 2024-06-05 RX ORDER — INSULIN LISPRO 100/ML
4 VIAL (ML) SUBCUTANEOUS
Refills: 0 | Status: DISCONTINUED | OUTPATIENT
Start: 2024-06-05 | End: 2024-06-05

## 2024-06-05 RX ORDER — INSULIN GLARGINE 100 [IU]/ML
7 INJECTION, SOLUTION SUBCUTANEOUS ONCE
Refills: 0 | Status: COMPLETED | OUTPATIENT
Start: 2024-06-05 | End: 2024-06-05

## 2024-06-05 RX ORDER — DEXTROSE 50 % IN WATER 50 %
12.5 SYRINGE (ML) INTRAVENOUS ONCE
Refills: 0 | Status: DISCONTINUED | OUTPATIENT
Start: 2024-06-05 | End: 2024-06-11

## 2024-06-05 RX ORDER — SODIUM CHLORIDE 9 MG/ML
1000 INJECTION, SOLUTION INTRAVENOUS
Refills: 0 | Status: DISCONTINUED | OUTPATIENT
Start: 2024-06-05 | End: 2024-06-06

## 2024-06-05 RX ORDER — POTASSIUM CHLORIDE 20 MEQ
40 PACKET (EA) ORAL ONCE
Refills: 0 | Status: COMPLETED | OUTPATIENT
Start: 2024-06-05 | End: 2024-06-05

## 2024-06-05 RX ORDER — DEXTROSE 50 % IN WATER 50 %
15 SYRINGE (ML) INTRAVENOUS ONCE
Refills: 0 | Status: DISCONTINUED | OUTPATIENT
Start: 2024-06-05 | End: 2024-06-11

## 2024-06-05 RX ORDER — LABETALOL HCL 100 MG
100 TABLET ORAL
Refills: 0 | Status: DISCONTINUED | OUTPATIENT
Start: 2024-06-05 | End: 2024-06-07

## 2024-06-05 RX ORDER — GLUCAGON INJECTION, SOLUTION 0.5 MG/.1ML
1 INJECTION, SOLUTION SUBCUTANEOUS ONCE
Refills: 0 | Status: DISCONTINUED | OUTPATIENT
Start: 2024-06-05 | End: 2024-06-11

## 2024-06-05 RX ORDER — DEXTROSE 50 % IN WATER 50 %
25 SYRINGE (ML) INTRAVENOUS ONCE
Refills: 0 | Status: DISCONTINUED | OUTPATIENT
Start: 2024-06-05 | End: 2024-06-11

## 2024-06-05 RX ADMIN — Medication 100 GRAM(S): at 02:22

## 2024-06-05 RX ADMIN — PIPERACILLIN AND TAZOBACTAM 25 GRAM(S): 4; .5 INJECTION, POWDER, LYOPHILIZED, FOR SOLUTION INTRAVENOUS at 06:04

## 2024-06-05 RX ADMIN — HEPARIN SODIUM 5000 UNIT(S): 5000 INJECTION INTRAVENOUS; SUBCUTANEOUS at 21:41

## 2024-06-05 RX ADMIN — CHLORHEXIDINE GLUCONATE 1 APPLICATION(S): 213 SOLUTION TOPICAL at 12:28

## 2024-06-05 RX ADMIN — Medication 25 GRAM(S): at 03:15

## 2024-06-05 RX ADMIN — POTASSIUM PHOSPHATE, MONOBASIC POTASSIUM PHOSPHATE, DIBASIC 83.33 MILLIMOLE(S): 236; 224 INJECTION, SOLUTION INTRAVENOUS at 00:39

## 2024-06-05 RX ADMIN — Medication 100 MILLIEQUIVALENT(S): at 06:04

## 2024-06-05 RX ADMIN — Medication 40 MILLIEQUIVALENT(S): at 12:27

## 2024-06-05 RX ADMIN — Medication 40 MILLIEQUIVALENT(S): at 23:23

## 2024-06-05 RX ADMIN — Medication 100 MILLIEQUIVALENT(S): at 00:50

## 2024-06-05 RX ADMIN — Medication 100 MILLIEQUIVALENT(S): at 04:21

## 2024-06-05 RX ADMIN — Medication 100 MILLIGRAM(S): at 14:06

## 2024-06-05 RX ADMIN — Medication 100 MILLIEQUIVALENT(S): at 01:52

## 2024-06-05 RX ADMIN — HEPARIN SODIUM 5000 UNIT(S): 5000 INJECTION INTRAVENOUS; SUBCUTANEOUS at 14:05

## 2024-06-05 RX ADMIN — INSULIN GLARGINE 7 UNIT(S): 100 INJECTION, SOLUTION SUBCUTANEOUS at 17:11

## 2024-06-05 RX ADMIN — PIPERACILLIN AND TAZOBACTAM 25 GRAM(S): 4; .5 INJECTION, POWDER, LYOPHILIZED, FOR SOLUTION INTRAVENOUS at 14:05

## 2024-06-05 RX ADMIN — SODIUM CHLORIDE 500 MILLILITER(S): 9 INJECTION, SOLUTION INTRAVENOUS at 03:48

## 2024-06-05 RX ADMIN — INSULIN HUMAN 10 UNIT(S)/HR: 100 INJECTION, SOLUTION SUBCUTANEOUS at 03:15

## 2024-06-05 RX ADMIN — Medication 8: at 21:56

## 2024-06-05 RX ADMIN — Medication 25 GRAM(S): at 23:23

## 2024-06-05 RX ADMIN — Medication 10 MILLIGRAM(S): at 04:14

## 2024-06-05 RX ADMIN — SODIUM CHLORIDE 150 MILLILITER(S): 9 INJECTION, SOLUTION INTRAVENOUS at 13:59

## 2024-06-05 RX ADMIN — SODIUM CHLORIDE 75 MILLILITER(S): 9 INJECTION, SOLUTION INTRAVENOUS at 21:41

## 2024-06-05 RX ADMIN — HEPARIN SODIUM 5000 UNIT(S): 5000 INJECTION INTRAVENOUS; SUBCUTANEOUS at 06:03

## 2024-06-05 RX ADMIN — INSULIN GLARGINE 7 UNIT(S): 100 INJECTION, SOLUTION SUBCUTANEOUS at 17:50

## 2024-06-05 RX ADMIN — SODIUM CHLORIDE 1000 MILLILITER(S): 9 INJECTION, SOLUTION INTRAVENOUS at 13:00

## 2024-06-05 RX ADMIN — Medication 100 MILLIEQUIVALENT(S): at 05:38

## 2024-06-05 RX ADMIN — Medication 40 MILLIEQUIVALENT(S): at 03:37

## 2024-06-05 NOTE — DIETITIAN INITIAL EVALUATION ADULT - OTHER INFO
Per chart, 38 y/o female with PMHx who was found down with AMS, found to have HAGMA/OSMAN/lactic acidosis due to DKA, acute metabolic encephalopathy due to the same, including hypernatremia    Nutrition interview: No recent episodes of nausea, vomiting, diarrhea or constipation, BM noted on 6/4 per RN flowsheets. No food allergies. Stated UBW: unsure but thinks around current wt (~180#). Pt's reported height 5'0". Pt is NPO currently with Dextrose 5% and lactated ringers in place, insulin infusion also running at time of visit.     Unable to provide nutrition education at present time as Pt lethargic at present. Would recommend reconsult for Nutrition Ed when Pt more alert/oriented.

## 2024-06-05 NOTE — CHART NOTE - NSCHARTNOTEFT_GEN_A_CORE
MICU Transfer Note    Transfer from: MICU    Transfer to: (  ) Medicine    (  ) Telemetry     (   ) RCU        (    ) Palliative         (   ) Stroke Unit          (   ) __________________    [facility]          ASSESSMENT & PLAN:             For Followup:          Vital Signs Last 24 Hrs  T(C): 37.3 (05 Jun 2024 12:00), Max: 37.7 (05 Jun 2024 00:00)  T(F): 99.1 (05 Jun 2024 12:00), Max: 99.9 (05 Jun 2024 00:00)  HR: 124 (05 Jun 2024 15:08) (106 - 132)  BP: 170/93 (05 Jun 2024 15:00) (144/68 - 186/90)  BP(mean): 110 (05 Jun 2024 15:00) (80 - 114)  RR: 30 (05 Jun 2024 15:08) (18 - 32)  SpO2: 100% (05 Jun 2024 12:00) (100% - 100%)    Parameters below as of 05 Jun 2024 06:00  Patient On (Oxygen Delivery Method): room air      I&O's Summary    04 Jun 2024 07:01  -  05 Jun 2024 07:00  --------------------------------------------------------  IN: 8586.5 mL / OUT: 4495 mL / NET: 4091.5 mL    05 Jun 2024 07:01  -  05 Jun 2024 16:01  --------------------------------------------------------  IN: 3146 mL / OUT: 3275 mL / NET: -129 mL        MEDICATIONS  (STANDING):  chlorhexidine 2% Cloths 1 Application(s) Topical daily  dextrose 10% Bolus 125 milliLiter(s) IV Bolus once  dextrose 5% 1000 milliLiter(s) (150 mL/Hr) IV Continuous <Continuous>  dextrose 5%. 1000 milliLiter(s) (50 mL/Hr) IV Continuous <Continuous>  dextrose 5%. 1000 milliLiter(s) (100 mL/Hr) IV Continuous <Continuous>  dextrose 50% Injectable 25 Gram(s) IV Push once  dextrose 50% Injectable 12.5 Gram(s) IV Push once  glucagon  Injectable 1 milliGRAM(s) IntraMuscular once  heparin   Injectable 5000 Unit(s) SubCutaneous every 8 hours  insulin glargine Injectable (LANTUS) 7 Unit(s) SubCutaneous once  insulin lispro (ADMELOG) corrective regimen sliding scale   SubCutaneous at bedtime  insulin lispro (ADMELOG) corrective regimen sliding scale   SubCutaneous three times a day before meals  insulin lispro Injectable (ADMELOG) 4 Unit(s) SubCutaneous three times a day before meals  insulin regular Infusion 7 Unit(s)/Hr (7 mL/Hr) IV Continuous <Continuous>  labetalol 100 milliGRAM(s) Oral two times a day  lactated ringers Bolus 1000 milliLiter(s) IV Bolus once  piperacillin/tazobactam IVPB.. 3.375 Gram(s) IV Intermittent every 8 hours    MEDICATIONS  (PRN):  dextrose Oral Gel 15 Gram(s) Oral once PRN Blood Glucose LESS THAN 70 milliGRAM(s)/deciliter        LABS                                            9.8                   Neurophils% (auto):   82.7   (06-05 @ 02:14):    20.13)-----------(304          Lymphocytes% (auto):  9.3                                           30.2                   Eosinphils% (auto):   0.4      Manual%: Neutrophils x    ; Lymphocytes x    ; Eosinophils x    ; Bands%: x    ; Blasts x                                    151    |  112    |  15                  Calcium: 8.9   / iCa: x      (06-05 @ 14:15)    ----------------------------<  250       Magnesium: 1.90                             3.7     |  25     |  1.34             Phosphorous: 2.6      TPro  6.4    /  Alb  3.2    /  TBili  0.4    /  DBili  x      /  AST  46     /  ALT  18     /  AlkPhos  116    05 Jun 2024 14:15    ( 06-05 @ 02:14 )   PT: 12.9 sec;   INR: 1.15 ratio  aPTT: 21.9 sec MICU Transfer Note    Transfer from: MICU    Transfer to: (  ) Medicine    (  ) Telemetry     (   ) RCU        (    ) Palliative         (   ) Stroke Unit          (   ) __________________    Accepting physican:      MICU COURSE:  This is a 38 y/o F with PMHx HTN who was found down at home w/ nausea on face. She was found to have glucose >1500, BHB>9, pH 7.11, UA w/ 40 ketones, admitted to MICU for DKA, AMS and sepsis.     For the DKA, patient was started on an insulin drip. She was found to have an A1c of 16.2 (no known hx of diabetes). Endo following, KELLY labs sent. Patient's blood glucose has now improved. AG now closed x2, restarted on diet and transitioned to basal/bolus regimen. Patient was also altered A&O x0, suspect in setting of DKA. She has now returned to baseline A&O x4.     Patient also met sepsis criteria on admission, likely 2/2 to aspiration pna. She has been on zosyn (6/3- ). UA, Ucx and blood cx negative. She initially required NC, now back on RA.     Course complicated w/ Na 164 (after glucose correction) on admission. She was started on D5LR w/ improvement of Na to 153. For the OSMAN, suspect 2/2 to pre-renal in setting of dehydration vs ATN, improved w/ fluids.     For Followup:  [ ] f/u endo recs   [ ] f/u Na, K, Cr  [ ] f/u BP - consider transition to ACE/ARB when kidney function improves  [ ] zosyn x 5 days for ?aspiration PNA (6/3- )     ASSESSMENT & PLAN:     This is a 38 y/o F with PMHx who was found down at home w/ nausea on face, admitted to MICU for DKA mgmt. Course complicated w/ sepsis, AMS, hypernatremia and OSMAN.     =======NEURO=========  #Encephalopathy resolved   - likely metabolic 2/2 DKA  - reassess when blood glucose and volume status improves  - CTH negative   - now back at baseline A&O x4     =======CARDIOVASCULAR=====  #Tachycardia  - sinus tach on EKG, likely in setting of DKA vs active infection vs dehydration   - trop neg x1    #HTN  - not on any home meds  - start labetolol 100mg BID  - pt will benefit from ACE/ARB when Cr improves     =======RESPIRATORY=========  #AHRF- resolved   - 2/2 to possible aspiration event?  - CXR limited view but clear lungs  - on RA    =======GASTROINTESTINAL====  - start CC now that AG x2 closed     =======GENITOURINARY=======  #OSMAN  - Admission Cr 4.57 -> 2.5, unclear baseline   - given fluid boluses in ED  - Possibly pre-renal ISO volume depletion and DKA + ATN   - ctm Cr  - avoid nephrotoxic agents  - renally dose meds  - monitor UO     #Hypernatremia - improved   - Na ~157 after correction of glucose   - 2/2 to dehydration, no PO intake  - 1.9 L free water deficit   - continue D5 LR mIVF for now     =======ID===================  #Sepsis   - WBC 16.9, T 100.4,    - U/A negative, limited CXR w/o focal consolidation, RVP negative, Blood cultures NGTD, UCx neg   - s/p vanc/zosyn in ED   - continue zosyn for now (6/3- ) x 5 days   - trend fever curve, WBC     =======HEME================  #Normocytic anemia  - no active bleed  - monitor CBC    =======ENDOCRINE============  #DKA- improving  #new diabetes  - per , no dx of DM    - A1c 16.2   - continue insulin drip per protocol  - FS q1h   - BMP/VBG q4 to monitor anion gap/acidosis, replete K as needed     =======PROPHYLAXIS===========  -Access: PIV  -Code Status: Full  -Disposition: pending clinical course    Vital Signs Last 24 Hrs  T(C): 37.3 (05 Jun 2024 12:00), Max: 37.7 (05 Jun 2024 00:00)  T(F): 99.1 (05 Jun 2024 12:00), Max: 99.9 (05 Jun 2024 00:00)  HR: 124 (05 Jun 2024 15:08) (106 - 132)  BP: 170/93 (05 Jun 2024 15:00) (144/68 - 186/90)  BP(mean): 110 (05 Jun 2024 15:00) (80 - 114)  RR: 30 (05 Jun 2024 15:08) (18 - 32)  SpO2: 100% (05 Jun 2024 12:00) (100% - 100%)    Parameters below as of 05 Jun 2024 06:00  Patient On (Oxygen Delivery Method): room air      I&O's Summary    04 Jun 2024 07:01  -  05 Jun 2024 07:00  --------------------------------------------------------  IN: 8586.5 mL / OUT: 4495 mL / NET: 4091.5 mL    05 Jun 2024 07:01  -  05 Jun 2024 16:01  --------------------------------------------------------  IN: 3146 mL / OUT: 3275 mL / NET: -129 mL        MEDICATIONS  (STANDING):  chlorhexidine 2% Cloths 1 Application(s) Topical daily  dextrose 10% Bolus 125 milliLiter(s) IV Bolus once  dextrose 5% 1000 milliLiter(s) (150 mL/Hr) IV Continuous <Continuous>  dextrose 5%. 1000 milliLiter(s) (50 mL/Hr) IV Continuous <Continuous>  dextrose 5%. 1000 milliLiter(s) (100 mL/Hr) IV Continuous <Continuous>  dextrose 50% Injectable 25 Gram(s) IV Push once  dextrose 50% Injectable 12.5 Gram(s) IV Push once  glucagon  Injectable 1 milliGRAM(s) IntraMuscular once  heparin   Injectable 5000 Unit(s) SubCutaneous every 8 hours  insulin glargine Injectable (LANTUS) 7 Unit(s) SubCutaneous once  insulin lispro (ADMELOG) corrective regimen sliding scale   SubCutaneous at bedtime  insulin lispro (ADMELOG) corrective regimen sliding scale   SubCutaneous three times a day before meals  insulin lispro Injectable (ADMELOG) 4 Unit(s) SubCutaneous three times a day before meals  insulin regular Infusion 7 Unit(s)/Hr (7 mL/Hr) IV Continuous <Continuous>  labetalol 100 milliGRAM(s) Oral two times a day  lactated ringers Bolus 1000 milliLiter(s) IV Bolus once  piperacillin/tazobactam IVPB.. 3.375 Gram(s) IV Intermittent every 8 hours    MEDICATIONS  (PRN):  dextrose Oral Gel 15 Gram(s) Oral once PRN Blood Glucose LESS THAN 70 milliGRAM(s)/deciliter    LABS                                            9.8                   Neurophils% (auto):   82.7   (06-05 @ 02:14):    20.13)-----------(304          Lymphocytes% (auto):  9.3                                           30.2                   Eosinphils% (auto):   0.4      Manual%: Neutrophils x    ; Lymphocytes x    ; Eosinophils x    ; Bands%: x    ; Blasts x                                    151    |  112    |  15                  Calcium: 8.9   / iCa: x      (06-05 @ 14:15)    ----------------------------<  250       Magnesium: 1.90                             3.7     |  25     |  1.34             Phosphorous: 2.6      TPro  6.4    /  Alb  3.2    /  TBili  0.4    /  DBili  x      /  AST  46     /  ALT  18     /  AlkPhos  116    05 Jun 2024 14:15    ( 06-05 @ 02:14 )   PT: 12.9 sec;   INR: 1.15 ratio  aPTT: 21.9 sec MICU Transfer Note    Transfer from: MICU    Transfer to: (  ) Medicine    (  ) Telemetry     (   ) RCU        (    ) Palliative         (   ) Stroke Unit          (   ) __________________    Accepting physican:      MICU COURSE:  This is a 40 y/o F with PMHx HTN who was found down at home w/ nausea on face. She was found to have glucose >1500, BHB>9, pH 7.11, UA w/ 40 ketones, admitted to MICU for DKA, AMS and sepsis.     For the DKA, patient was started on an insulin drip. She was found to have an A1c of 16.2 (no known hx of diabetes). Endo following, KELLY labs sent. Patient's blood glucose has now improved. AG now closed x2, restarted on diet and transitioned to basal/bolus regimen. Patient was also altered A&O x0, suspect in setting of DKA. She has now returned to baseline A&O x4.     Patient also met sepsis criteria on admission, likely 2/2 to aspiration. She has been on zosyn (6/3- ). UA, Ucx and blood cx negative. She initially required NC, now back on RA.     Course complicated w/ Na 164 (after glucose correction) on admission. She was started on D5LR w/ improvement of Na to 153. For the OSMAN, suspect 2/2 to pre-renal in setting of dehydration vs ATN, continues to improve w/ fluids.     For Followup:  [ ] f/u endo recs   [ ] f/u BMP - Na, K, Cr  [ ] f/u BP - consider transition to ACE/ARB when kidney function improves  [ ] zosyn x 5 days for ?aspiration (6/3- )     ASSESSMENT & PLAN:     This is a 40 y/o F with PMHx who was found down at home w/ nausea on face, admitted to MICU for DKA mgmt. Course complicated w/ sepsis, AMS, hypernatremia and OSMAN.     =======NEURO=========  #Encephalopathy resolved   - likely metabolic 2/2 DKA  - reassess when blood glucose and volume status improves  - CTH negative   - now back at baseline A&O x4     =======CARDIOVASCULAR=====  #Tachycardia  - sinus tach on EKG, likely in setting of DKA vs active infection vs dehydration   - trop neg x1    #HTN  - not on any home meds  - start labetolol 100mg BID  - pt will benefit from ACE/ARB when Cr improves     =======RESPIRATORY=========  #AHRF- resolved   - 2/2 to possible aspiration event?  - CXR limited view but clear lungs  - on RA    =======GASTROINTESTINAL====  - start CC now that AG x2 closed     =======GENITOURINARY=======  #OSMAN  - Admission Cr 4.57 -> 2.5, unclear baseline   - given fluid boluses in ED  - Possibly pre-renal ISO volume depletion and DKA + ATN   - ctm Cr  - avoid nephrotoxic agents  - renally dose meds  - monitor UO     #Hypernatremia - improved   - Na ~157 after correction of glucose   - 2/2 to dehydration, no PO intake  - 1.9 L free water deficit   - continue D5 LR mIVF for now     =======ID===================  #Sepsis   - WBC 16.9, T 100.4,    - U/A negative, limited CXR w/o focal consolidation, RVP negative, Blood cultures NGTD, UCx neg   - s/p vanc/zosyn in ED   - continue zosyn for now (6/3- ) x 5 days   - trend fever curve, WBC     =======HEME================  #Normocytic anemia  - no active bleed  - monitor CBC    =======ENDOCRINE============  #DKA- improving  #new diabetes  - per , no dx of DM    - A1c 16.2   - continue insulin drip per protocol  - FS q1h   - BMP/VBG q4 to monitor anion gap/acidosis, replete K as needed     =======PROPHYLAXIS===========  -Access: PIV  -Code Status: Full  -Disposition: pending clinical course    Vital Signs Last 24 Hrs  T(C): 37.3 (05 Jun 2024 12:00), Max: 37.7 (05 Jun 2024 00:00)  T(F): 99.1 (05 Jun 2024 12:00), Max: 99.9 (05 Jun 2024 00:00)  HR: 124 (05 Jun 2024 15:08) (106 - 132)  BP: 170/93 (05 Jun 2024 15:00) (144/68 - 186/90)  BP(mean): 110 (05 Jun 2024 15:00) (80 - 114)  RR: 30 (05 Jun 2024 15:08) (18 - 32)  SpO2: 100% (05 Jun 2024 12:00) (100% - 100%)    Parameters below as of 05 Jun 2024 06:00  Patient On (Oxygen Delivery Method): room air      I&O's Summary    04 Jun 2024 07:01  -  05 Jun 2024 07:00  --------------------------------------------------------  IN: 8586.5 mL / OUT: 4495 mL / NET: 4091.5 mL    05 Jun 2024 07:01  -  05 Jun 2024 16:01  --------------------------------------------------------  IN: 3146 mL / OUT: 3275 mL / NET: -129 mL        MEDICATIONS  (STANDING):  chlorhexidine 2% Cloths 1 Application(s) Topical daily  dextrose 10% Bolus 125 milliLiter(s) IV Bolus once  dextrose 5% 1000 milliLiter(s) (150 mL/Hr) IV Continuous <Continuous>  dextrose 5%. 1000 milliLiter(s) (50 mL/Hr) IV Continuous <Continuous>  dextrose 5%. 1000 milliLiter(s) (100 mL/Hr) IV Continuous <Continuous>  dextrose 50% Injectable 25 Gram(s) IV Push once  dextrose 50% Injectable 12.5 Gram(s) IV Push once  glucagon  Injectable 1 milliGRAM(s) IntraMuscular once  heparin   Injectable 5000 Unit(s) SubCutaneous every 8 hours  insulin glargine Injectable (LANTUS) 7 Unit(s) SubCutaneous once  insulin lispro (ADMELOG) corrective regimen sliding scale   SubCutaneous at bedtime  insulin lispro (ADMELOG) corrective regimen sliding scale   SubCutaneous three times a day before meals  insulin lispro Injectable (ADMELOG) 4 Unit(s) SubCutaneous three times a day before meals  insulin regular Infusion 7 Unit(s)/Hr (7 mL/Hr) IV Continuous <Continuous>  labetalol 100 milliGRAM(s) Oral two times a day  lactated ringers Bolus 1000 milliLiter(s) IV Bolus once  piperacillin/tazobactam IVPB.. 3.375 Gram(s) IV Intermittent every 8 hours    MEDICATIONS  (PRN):  dextrose Oral Gel 15 Gram(s) Oral once PRN Blood Glucose LESS THAN 70 milliGRAM(s)/deciliter    LABS                                            9.8                   Neurophils% (auto):   82.7   (06-05 @ 02:14):    20.13)-----------(304          Lymphocytes% (auto):  9.3                                           30.2                   Eosinphils% (auto):   0.4      Manual%: Neutrophils x    ; Lymphocytes x    ; Eosinophils x    ; Bands%: x    ; Blasts x                                    151    |  112    |  15                  Calcium: 8.9   / iCa: x      (06-05 @ 14:15)    ----------------------------<  250       Magnesium: 1.90                             3.7     |  25     |  1.34             Phosphorous: 2.6      TPro  6.4    /  Alb  3.2    /  TBili  0.4    /  DBili  x      /  AST  46     /  ALT  18     /  AlkPhos  116    05 Jun 2024 14:15    ( 06-05 @ 02:14 )   PT: 12.9 sec;   INR: 1.15 ratio  aPTT: 21.9 sec MICU Transfer Note    Transfer from: MICU    Transfer to: (  ) Medicine    (  ) Telemetry     (   ) RCU        (    ) Palliative         (   ) Stroke Unit          (   ) __________________    Accepting physican:      MICU COURSE:  This is a 38 y/o F with PMHx HTN who was found down at home w/ nausea on face. She was found to have glucose >1500, BHB>9, pH 7.11, UA w/ 40 ketones, admitted to MICU for DKA, AMS and sepsis.     For the DKA, patient was started on an insulin drip. She was found to have an A1c of 16.2 (no known hx of diabetes). Endo following, KELLY labs sent. Patient's blood glucose has now improved. AG now closed x2, restarted on diet and transitioned to basal/bolus regimen. Patient was also altered A&O x0, suspect in setting of DKA. She has now returned to baseline A&O x4.     Patient also met sepsis criteria on admission, likely 2/2 to aspiration. She has been on zosyn (6/3- ). UA, Ucx and blood cx negative. She initially required NC, now back on RA.     Course complicated w/ Na 164 (after glucose correction) on admission, now resolved. For the OSMAN, suspect 2/2 to pre-renal in setting of dehydration vs ATN, continues to improve w/ fluids.     For Followup:  [ ] f/u endo recs - currently on 40 units of lantus, admelog 12 units premeal, mod sliding scare premeal/bedtime per endo recs   [ ] f/u BMP - Na, K, Cr, BHB   [ ] f/u BP - consider transition to ACE/ARB when kidney function improves    ASSESSMENT & PLAN:     This is a 38 y/o F with PMHx who was found down at home w/ nausea on face, admitted to MICU for DKA mgmt. Course complicated w/ sepsis, AMS, hypernatremia and OSMAN.     =======NEURO=========  #Encephalopathy resolved   - likely metabolic 2/2 DKA  - reassess when blood glucose and volume status improves  - CTH negative   - now back at baseline A&O x4     =======CARDIOVASCULAR=====  #Tachycardia  - sinus tach on EKG, likely in setting of DKA vs active infection vs dehydration   - trop neg x1    #HTN  - not on any home meds  - start labetolol 100mg BID  - pt will benefit from ACE/ARB when Cr improves     =======RESPIRATORY=========  #AHRF- resolved   - 2/2 to possible aspiration event?  - CXR limited view but clear lungs  - on RA    =======GASTROINTESTINAL====  - start CC now that AG x2 closed     =======GENITOURINARY=======  #OSMAN  - Admission Cr 4.57 -> 2.5, unclear baseline   - given fluid boluses in ED  - Possibly pre-renal ISO volume depletion and DKA + ATN   - ctm Cr  - avoid nephrotoxic agents  - renally dose meds  - monitor UO     #Hypernatremia - improved   - Na ~157 after correction of glucose   - 2/2 to dehydration, no PO intake  - 1.9 L free water deficit   - continue D5 LR mIVF for now     =======ID===================  #Sepsis   - WBC 16.9, T 100.4,    - U/A negative, limited CXR w/o focal consolidation, RVP negative, Blood cultures NGTD, UCx neg   - s/p vanc/zosyn in ED   - continue zosyn for now (6/3- ) x 5 days   - trend fever curve, WBC     =======HEME================  #Normocytic anemia  - no active bleed  - monitor CBC    =======ENDOCRINE============  #DKA- improving  #new diabetes  - per , no dx of DM    - A1c 16.2   - continue insulin drip per protocol  - FS q1h   - BMP/VBG q4 to monitor anion gap/acidosis, replete K as needed     =======PROPHYLAXIS===========  -Access: PIV  -Code Status: Full  -Disposition: pending clinical course    Vital Signs Last 24 Hrs  T(C): 37.3 (05 Jun 2024 12:00), Max: 37.7 (05 Jun 2024 00:00)  T(F): 99.1 (05 Jun 2024 12:00), Max: 99.9 (05 Jun 2024 00:00)  HR: 124 (05 Jun 2024 15:08) (106 - 132)  BP: 170/93 (05 Jun 2024 15:00) (144/68 - 186/90)  BP(mean): 110 (05 Jun 2024 15:00) (80 - 114)  RR: 30 (05 Jun 2024 15:08) (18 - 32)  SpO2: 100% (05 Jun 2024 12:00) (100% - 100%)    Parameters below as of 05 Jun 2024 06:00  Patient On (Oxygen Delivery Method): room air      I&O's Summary    04 Jun 2024 07:01  -  05 Jun 2024 07:00  --------------------------------------------------------  IN: 8586.5 mL / OUT: 4495 mL / NET: 4091.5 mL    05 Jun 2024 07:01  -  05 Jun 2024 16:01  --------------------------------------------------------  IN: 3146 mL / OUT: 3275 mL / NET: -129 mL        MEDICATIONS  (STANDING):  chlorhexidine 2% Cloths 1 Application(s) Topical daily  dextrose 10% Bolus 125 milliLiter(s) IV Bolus once  dextrose 5% 1000 milliLiter(s) (150 mL/Hr) IV Continuous <Continuous>  dextrose 5%. 1000 milliLiter(s) (50 mL/Hr) IV Continuous <Continuous>  dextrose 5%. 1000 milliLiter(s) (100 mL/Hr) IV Continuous <Continuous>  dextrose 50% Injectable 25 Gram(s) IV Push once  dextrose 50% Injectable 12.5 Gram(s) IV Push once  glucagon  Injectable 1 milliGRAM(s) IntraMuscular once  heparin   Injectable 5000 Unit(s) SubCutaneous every 8 hours  insulin glargine Injectable (LANTUS) 7 Unit(s) SubCutaneous once  insulin lispro (ADMELOG) corrective regimen sliding scale   SubCutaneous at bedtime  insulin lispro (ADMELOG) corrective regimen sliding scale   SubCutaneous three times a day before meals  insulin lispro Injectable (ADMELOG) 4 Unit(s) SubCutaneous three times a day before meals  insulin regular Infusion 7 Unit(s)/Hr (7 mL/Hr) IV Continuous <Continuous>  labetalol 100 milliGRAM(s) Oral two times a day  lactated ringers Bolus 1000 milliLiter(s) IV Bolus once  piperacillin/tazobactam IVPB.. 3.375 Gram(s) IV Intermittent every 8 hours    MEDICATIONS  (PRN):  dextrose Oral Gel 15 Gram(s) Oral once PRN Blood Glucose LESS THAN 70 milliGRAM(s)/deciliter    LABS                                            9.8                   Neurophils% (auto):   82.7   (06-05 @ 02:14):    20.13)-----------(304          Lymphocytes% (auto):  9.3                                           30.2                   Eosinphils% (auto):   0.4      Manual%: Neutrophils x    ; Lymphocytes x    ; Eosinophils x    ; Bands%: x    ; Blasts x                                    151    |  112    |  15                  Calcium: 8.9   / iCa: x      (06-05 @ 14:15)    ----------------------------<  250       Magnesium: 1.90                             3.7     |  25     |  1.34             Phosphorous: 2.6      TPro  6.4    /  Alb  3.2    /  TBili  0.4    /  DBili  x      /  AST  46     /  ALT  18     /  AlkPhos  116    05 Jun 2024 14:15    ( 06-05 @ 02:14 )   PT: 12.9 sec;   INR: 1.15 ratio  aPTT: 21.9 sec

## 2024-06-05 NOTE — DIETITIAN INITIAL EVALUATION ADULT - ORAL INTAKE PTA/DIET HISTORY
Pt lethargic during time of nutrition interview, however was able to answer yes/no question. Pt lives at home with her . They cook together at home. No difficulty obtaining groceries. No specific diet followed PTA. Was taking multivitamin PTA.

## 2024-06-05 NOTE — PROGRESS NOTE ADULT - ASSESSMENT
This is a 40 y/o F with PMHx who was found down at home w/ nausea on face, admitted to MICU for DKA mgmt.     =======NEURO=========  #Encephalopathy  - likely metabolic 2/2 DKA, also possibly in setting of infection  - reassess when blood glucose and volume status improves  - A&O x 4 at baseline   - CTH given continued AMS     =======CARDIOVASCULAR=====  #Tachycardia  - likely in setting of DKA vs active infection vs dehydration   - trop neg x1  - EKG    =======RESPIRATORY=========  #AHRF  - 2/2 to possible aspiration event?  - CXR limited view but clear lungs  - wean o2 as tolerated     =======GASTROINTESTINAL====  - NPO     =======GENITOURINARY=======  #OSMAN  - Admission Cr 4.57 -> 2.5, unclear baseline   - given fluid boluses in ED  - Possibly pre-renal ISO volume depletion and DKA   - urine lytes   - ctm Cr  - avoid nephrotoxic agents  - renally dose meds  - strict I's and O's    #Hypernatremia   - Na ~165 after correction of glucose  - 2/2 to dehydration, no PO intake  - continue D5 LR mIVF for now   - continue switch to 1/2NS or place NGT for free water flushes (pt w/ agitation)     =======ID===================  #Sepsis   - WBC 16.9, T 100.4,    - U/A negative, limited CXR w/o focal consolidation, RVP negative   - s/p vanc/zosyn in ED   - continue vanc, zosyn for now (6/3- )   - f/u urine cx, blood culture   - consider LP if pt w/o improvement in mental status     =======HEME================  #Normocytic anemia  - no active bleed  - monitor CBC    =======ENDOCRINE============  #DKA  #new diabetes  - per , no dx of DM    - A1c 16.2   - continue insulin drip per protocol  - FS q1h   - NPO   - BMP/VBG q4 to monitor anion gap/acidosis, replete K as needed     =======PROPHYLAXIS===========  -Access: PIV  -Code Status: Full  -Disposition: pending clinical course This is a 38 y/o F with PMHx who was found down at home w/ nausea on face, admitted to MICU for DKA mgmt.     =======NEURO=========  #Encephalopathy resolved   - likely metabolic 2/2 DKA  - reassess when blood glucose and volume status improves  - CTH negative   - now back at baseline A&O x4     =======CARDIOVASCULAR=====  #Tachycardia  - sinus tach on EKG, likely in setting of DKA vs active infection vs dehydration   - trop neg x1    =======RESPIRATORY=========  #AHRF- resolved   - 2/2 to possible aspiration event?  - CXR limited view but clear lungs  - on RA    =======GASTROINTESTINAL====  - NPO until DKA resolves     =======GENITOURINARY=======  #OSMAN  - Admission Cr 4.57 -> 2.5, unclear baseline   - given fluid boluses in ED  - Possibly pre-renal ISO volume depletion and DKA + ATN   - ctm Cr  - avoid nephrotoxic agents  - renally dose meds  - strict I's and O's    #Hypernatremia - improved   - Na ~157 after correction of glucose  - 2/2 to dehydration, no PO intake  - 1.9 L free water deficit   - continue D5 LR mIVF for now     =======ID===================  #Sepsis   - WBC 16.9, T 100.4,    - U/A negative, limited CXR w/o focal consolidation, RVP negative, Blood cultures NGTD, UCx neg   - s/p vanc/zosyn in ED   - continue zosyn for now (6/3- ) x 5 days   - trend fever curve, WBC     =======HEME================  #Normocytic anemia  - no active bleed  - monitor CBC    =======ENDOCRINE============  #DKA  #new diabetes  - per , no dx of DM    - A1c 16.2   - continue insulin drip per protocol  - FS q1h   - NPO   - BMP/VBG q4 to monitor anion gap/acidosis, replete K as needed     =======PROPHYLAXIS===========  -Access: PIV  -Code Status: Full  -Disposition: pending clinical course This is a 40 y/o F with PMHx who was found down at home w/ nausea on face, admitted to MICU for DKA mgmt.     =======NEURO=========  #Encephalopathy resolved   - likely metabolic 2/2 DKA  - reassess when blood glucose and volume status improves  - CTH negative   - now back at baseline A&O x4     =======CARDIOVASCULAR=====  #Tachycardia  - sinus tach on EKG, likely in setting of DKA vs active infection vs dehydration   - trop neg x1    #HTN  - not on any home meds  - start labetolol 100mg BID  - pt will benefit from ACE/ARB when Cr improves     =======RESPIRATORY=========  #AHRF- resolved   - 2/2 to possible aspiration event?  - CXR limited view but clear lungs  - on RA    =======GASTROINTESTINAL====  - NPO until DKA resolves     =======GENITOURINARY=======  #OSMAN  - Admission Cr 4.57 -> 2.5, unclear baseline   - given fluid boluses in ED  - Possibly pre-renal ISO volume depletion and DKA + ATN   - ctm Cr  - avoid nephrotoxic agents  - renally dose meds  - strict I's and O's    #Hypernatremia - improved   - Na ~157 after correction of glucose  - 2/2 to dehydration, no PO intake  - 1.9 L free water deficit   - continue D5 LR mIVF for now     =======ID===================  #Sepsis   - WBC 16.9, T 100.4,    - U/A negative, limited CXR w/o focal consolidation, RVP negative, Blood cultures NGTD, UCx neg   - s/p vanc/zosyn in ED   - continue zosyn for now (6/3- ) x 5 days   - trend fever curve, WBC     =======HEME================  #Normocytic anemia  - no active bleed  - monitor CBC    =======ENDOCRINE============  #DKA  #new diabetes  - per , no dx of DM    - A1c 16.2   - continue insulin drip per protocol  - FS q1h   - NPO   - BMP/VBG q4 to monitor anion gap/acidosis, replete K as needed     =======PROPHYLAXIS===========  -Access: PIV  -Code Status: Full  -Disposition: pending clinical course This is a 38 y/o F with PMHx who was found down at home w/ nausea on face, admitted to MICU for DKA mgmt.     =======NEURO=========  #Encephalopathy resolved   - likely metabolic 2/2 DKA  - reassess when blood glucose and volume status improves  - CTH negative   - now back at baseline A&O x4     =======CARDIOVASCULAR=====  #Tachycardia  - sinus tach on EKG, likely in setting of DKA vs active infection vs dehydration   - trop neg x1    #HTN  - not on any home meds  - start labetolol 100mg BID  - pt will benefit from ACE/ARB when Cr improves     =======RESPIRATORY=========  #AHRF- resolved   - 2/2 to possible aspiration event?  - CXR limited view but clear lungs  - on RA    =======GASTROINTESTINAL====  - NPO until DKA resolves     =======GENITOURINARY=======  #OSMAN  - Admission Cr 4.57 -> 2.5, unclear baseline   - given fluid boluses in ED  - Possibly pre-renal ISO volume depletion and DKA + ATN   - ctm Cr  - avoid nephrotoxic agents  - renally dose meds  - strict I's and O's    #Hypernatremia - improved   - Na ~157 after correction of glucose  - 2/2 to dehydration, no PO intake  - 1.9 L free water deficit   - continue D5 LR mIVF for now     =======ID===================  #Sepsis   - WBC 16.9, T 100.4,    - U/A negative, limited CXR w/o focal consolidation, RVP negative, Blood cultures NGTD, UCx neg   - s/p vanc/zosyn in ED   - continue zosyn for now (6/3- ) x 5 days   - trend fever curve, WBC     =======HEME================  #Normocytic anemia  - no active bleed  - monitor CBC    =======ENDOCRINE============  #DKA- improving  #new diabetes  - per , no dx of DM    - A1c 16.2   - continue insulin drip per protocol  - FS q1h   - NPO   - BMP/VBG q4 to monitor anion gap/acidosis, replete K as needed     =======PROPHYLAXIS===========  -Access: PIV  -Code Status: Full  -Disposition: pending clinical course

## 2024-06-05 NOTE — PROGRESS NOTE ADULT - SUBJECTIVE AND OBJECTIVE BOX
Chief Complaint: DM/DKA     History: Pt seen at bedside. Pt is NPO on dextrose IVF and insulin drip. Pt has kaofeed. Pt denies nausea and vomiting/any signs of hypoglycemia. Pt reports an adequate appetite.     MEDICATIONS  (STANDING):  chlorhexidine 2% Cloths 1 Application(s) Topical daily  dextrose 5% 1000 milliLiter(s) (150 mL/Hr) IV Continuous <Continuous>  heparin   Injectable 5000 Unit(s) SubCutaneous every 8 hours  insulin regular Infusion 7 Unit(s)/Hr (7 mL/Hr) IV Continuous <Continuous>  labetalol 100 milliGRAM(s) Oral two times a day  lactated ringers Bolus 1000 milliLiter(s) IV Bolus once  piperacillin/tazobactam IVPB.. 3.375 Gram(s) IV Intermittent every 8 hours    MEDICATIONS  (PRN):      Allergies: No Known Allergies      Review of Systems:  Respiratory: No SOB, no cough  GI: No nausea, vomiting, abdominal pain  Endocrine: no polyuria, polydipsia    PHYSICAL EXAM:  VITALS: T(C): 37.3 (06-05-24 @ 12:00)  T(F): 99.1 (06-05-24 @ 12:00), Max: 99.9 (06-05-24 @ 00:00)  HR: 122 (06-05-24 @ 14:15) (106 - 132)  BP: 184/99 (06-05-24 @ 14:00) (138/63 - 186/90)  RR:  (18 - 32)  SpO2:  (100% - 100%)  Wt(kg): --  GENERAL: NAD, well-groomed, well-developed  RESPIRATORY: No labored breathing   GI: Soft, nontender, non distended  PSYCH: Alert and oriented x 3, normal affect, normal mood      CAPILLARY BLOOD GLUCOSE  POCT Blood Glucose.: 254 mg/dL (05 Jun 2024 14:15)  POCT Blood Glucose.: 234 mg/dL (05 Jun 2024 13:18)  POCT Blood Glucose.: 251 mg/dL (05 Jun 2024 12:13)  POCT Blood Glucose.: 241 mg/dL (05 Jun 2024 11:22)  POCT Blood Glucose.: 193 mg/dL (05 Jun 2024 10:11)  POCT Blood Glucose.: 183 mg/dL (05 Jun 2024 09:06)  POCT Blood Glucose.: 173 mg/dL (05 Jun 2024 08:04)  POCT Blood Glucose.: 138 mg/dL (05 Jun 2024 06:57)  POCT Blood Glucose.: 153 mg/dL (05 Jun 2024 06:03)  POCT Blood Glucose.: 161 mg/dL (05 Jun 2024 05:02)  POCT Blood Glucose.: 196 mg/dL (05 Jun 2024 03:58)  POCT Blood Glucose.: 230 mg/dL (05 Jun 2024 02:56)  POCT Blood Glucose.: 238 mg/dL (05 Jun 2024 01:57)  POCT Blood Glucose.: 233 mg/dL (05 Jun 2024 01:05)  POCT Blood Glucose.: 263 mg/dL (05 Jun 2024 00:09)  POCT Blood Glucose.: 279 mg/dL (04 Jun 2024 23:05)  POCT Blood Glucose.: 281 mg/dL (04 Jun 2024 22:02)  POCT Blood Glucose.: 344 mg/dL (04 Jun 2024 21:01)  POCT Blood Glucose.: 352 mg/dL (04 Jun 2024 20:18)  POCT Blood Glucose.: 285 mg/dL (04 Jun 2024 18:58)  POCT Blood Glucose.: 264 mg/dL (04 Jun 2024 17:59)  POCT Blood Glucose.: 271 mg/dL (04 Jun 2024 16:59)  POCT Blood Glucose.: 314 mg/dL (04 Jun 2024 16:01)  POCT Blood Glucose.: 289 mg/dL (04 Jun 2024 15:01)    A1C with Estimated Average Glucose (06.04.24 @ 01:40)    A1C with Estimated Average Glucose Result: 16.2   Estimated Average Glucose: 418      06-05    154<H>  |  115<H>  |  18  ----------------------------<  225<H>  3.3<L>   |  25  |  1.49<H>    eGFR: 45<L>    Ca    8.9      06-05  Mg     2.20     06-05  Phos  2.8     06-05    TPro  6.8  /  Alb  3.1<L>  /  TBili  0.4  /  DBili  x   /  AST  47<H>  /  ALT  18  /  AlkPhos  113  06-05      Diet, NPO (06-03-24 @ 20:00) [Active]

## 2024-06-05 NOTE — DIETITIAN INITIAL EVALUATION ADULT - PERTINENT MEDS FT
MEDICATIONS  (STANDING):  chlorhexidine 2% Cloths 1 Application(s) Topical daily  dextrose 5% + lactated ringers 1000 milliLiter(s) (150 mL/Hr) IV Continuous <Continuous>  heparin   Injectable 5000 Unit(s) SubCutaneous every 8 hours  insulin regular Infusion 4 Unit(s)/Hr (4 mL/Hr) IV Continuous <Continuous>  labetalol 100 milliGRAM(s) Oral two times a day  piperacillin/tazobactam IVPB.. 3.375 Gram(s) IV Intermittent every 8 hours

## 2024-06-05 NOTE — PROGRESS NOTE ADULT - ASSESSMENT
40 y/o F w/ PMHx of HTN who presented after found down at home unresponsive in bed with emesis on face, admitted to MICU for DKA. a1c 16.2%, started on insulin gtt. 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%)  Home meds: none  - does not meet criteria for transitioning (AG <12, Bicarb > 18, FS < 200-250)  - would recommend continuing insulin drip per DKA protocol and keep patient NPO while on insulin drip  - add dextrose to fluids when FS < 250  - check BMP/VBG/BHB q4h  - given patient with acute DKA - should check Latent autoimmune diabetes of adults (KELLY) antibodies: zinc transporter antibody, glutamic acid decarboxylase antibody, IA-2 antibody (specimen received awaiting results); will check c-peptide when glucose controlled and closer to dc as c-peptide can be falsely low with recent DKA     - Nutrition consult and diabetes/insulin pen teaching prior to discharge given this is NEW diabetes diagnosis, including glucometer teaching.    - Recommend CGM discuss further with patient when able.    - DISCHARGE RECOMMENDATIONS: basal-bolus regimen, doses TBD.     Pt will need prescription for basal insulin pen (ie. Lantus Solostar pen, if not covered then ask pharmacy which brand of basal insulin is covered by their insurance plan - other brands include: Basaglar, Lantus, Tresiba, Toujeo) and bolus insulin pen (ie. Humalog Kwikpen , if not covered then ask pharmacy which brand of bolus insulin is covered by their insurance plan - other brands include: Novolog, Admelog). Dispense 5 insulin pens with 3 refills. Please send prescriptions for diabetes supplies (glucometer, test strips, lancets, alcohol swabs, insulin pen needles) - dispense #100, use as directed (3 refills).    - OUT-PATIENT FOLLOW UP: Patient should follow up with Endocrinology office ([phone]) at [address] [place] [address] [address] [address]; will clarify with pt if ok to email office for an appointment     #HLD  LDL 61  - goal LDL in T2DM is <70    #HTN  - goal BP in T2DM is <130/80  - please obtain urine micro albumin cr ratio as an outpt   - defer to primary team    D/w MICU team   D/w Tatianna Pham Endocrinologist       Mervat Renee  Nurse Practitioner  Division of Endocrinology & Diabetes  In house pager #[phone]    If before 9AM or after 6PM, or on weekends/holidays, please call endocrine answering service for assistance ([phone]).For nonurgent matters email [email or web address] for assistance.    40 y/o F w/ PMHx of HTN who presented after found down at home unresponsive in bed with emesis on face, admitted to MICU for DKA. a1c 16.2%, started on insulin gtt. 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%)  Home meds: none  - Glucose 140-180mg/dl: above goal   - on insulin gtt; please transition off insulin gtt with Lantus 14 units stat then stop insulin gtt 2 hours after Lantus dose is given; stop Dextrose IVF immediately   - If eating can add Admelog 4 units TID AC (please hold if npo/not eating)  - Add Admelog low correction scale tid ac and seperate Admelog low correction scale at bedtime   - FS before meals and at bedtime   - Consistent carb diet   - RD consult  - Hypoglycemia Protocol   - given patient with acute DKA - should check Latent autoimmune diabetes of adults (KELLY) antibodies: zinc transporter antibody, glutamic acid decarboxylase antibody, IA-2 antibody (specimen received awaiting results); will check c-peptide when glucose controlled and closer to dc as c-peptide can be falsely low with recent DKA     - Nutrition consult and diabetes/insulin pen teaching prior to discharge given this is NEW diabetes diagnosis, including glucometer teaching.    - Recommend CGM discuss further with patient when able.    - DISCHARGE RECOMMENDATIONS: basal-bolus regimen, doses TBD.     Pt will need prescription for basal insulin pen (ie. Lantus Solostar pen, if not covered then ask pharmacy which brand of basal insulin is covered by their insurance plan - other brands include: Basaglar, Lantus, Tresiba, Toujeo) and bolus insulin pen (ie. Humalog Kwikpen , if not covered then ask pharmacy which brand of bolus insulin is covered by their insurance plan - other brands include: Novolog, Admelog). Dispense 5 insulin pens with 3 refills. Please send prescriptions for diabetes supplies (glucometer, test strips, lancets, alcohol swabs, insulin pen needles) - dispense #100, use as directed (3 refills).    - OUT-PATIENT FOLLOW UP: Patient should follow up with Endocrinology office ([phone]) at [address] [place] [address] [address] [facility], [place] [address]; will clarify with pt if ok to email office for an appointment     #HLD  - LDL 61  - goal LDL in T2DM is <70    #HTN  - goal BP in T2DM is <130/80  - please obtain urine micro albumin cr ratio as an outpt   - defer to primary team    D/w MICU team   D/w Tatianna Currie Endocrinologist     D/w Dr. Luz Marina Renee  Nurse Practitioner  Division of Endocrinology & Diabetes  In house pager #[phone]    If before 9AM or after 6PM, or on weekends/holidays, please call endocrine answering service for assistance ([phone]).For nonurgent matters email [email or web address] for assistance.

## 2024-06-05 NOTE — PROGRESS NOTE ADULT - SUBJECTIVE AND OBJECTIVE BOX
INTERVAL HPI/OVERNIGHT EVENTS:    SUBJECTIVE: Patient seen and examined at bedside.     ROS: All negative except as listed above.    VITAL SIGNS:  ICU Vital Signs Last 24 Hrs  T(C): 37.6 (05 Jun 2024 04:00), Max: 37.7 (05 Jun 2024 00:00)  T(F): 99.7 (05 Jun 2024 04:00), Max: 99.9 (05 Jun 2024 00:00)  HR: 118 (05 Jun 2024 06:00) (106 - 132)  BP: 165/82 (05 Jun 2024 06:00) (138/63 - 186/90)  BP(mean): 101 (05 Jun 2024 06:00) (80 - 115)  ABP: --  ABP(mean): --  RR: 23 (05 Jun 2024 06:00) (18 - 31)  SpO2: 100% (05 Jun 2024 06:00) (100% - 100%)    O2 Parameters below as of 05 Jun 2024 06:00  Patient On (Oxygen Delivery Method): room air            Plateau pressure:   P/F ratio:     06-04 @ 07:01  -  06-05 @ 07:00  --------------------------------------------------------  IN: 8586.5 mL / OUT: 4495 mL / NET: 4091.5 mL      CAPILLARY BLOOD GLUCOSE      POCT Blood Glucose.: 138 mg/dL (05 Jun 2024 06:57)      ECG: reviewed.    PHYSICAL EXAM:    GENERAL: NAD, lying in bed comfortably  HEAD:  Atraumatic, normocephalic  EYES: EOMI, PERRLA, conjunctiva and sclera clear  NECK: Supple, trachea midline, no JVD  HEART: Regular rate and rhythm, no murmurs, rubs, or gallops  LUNGS: Unlabored respirations.  Clear to auscultation bilaterally, no crackles, wheezing, or rhonchi  ABDOMEN: Soft, nontender, nondistended, +BS  EXTREMITIES: 2+ peripheral pulses bilaterally, cap refill<2 secs. No clubbing, cyanosis, or edema  NERVOUS SYSTEM:  A&Ox3, following commands, moving all extremities, no focal deficits   SKIN: No rashes or lesions    MEDICATIONS:  MEDICATIONS  (STANDING):  chlorhexidine 2% Cloths 1 Application(s) Topical daily  dextrose 5% + lactated ringers 1000 milliLiter(s) (150 mL/Hr) IV Continuous <Continuous>  heparin   Injectable 5000 Unit(s) SubCutaneous every 8 hours  insulin regular Infusion 4 Unit(s)/Hr (4 mL/Hr) IV Continuous <Continuous>  piperacillin/tazobactam IVPB.. 3.375 Gram(s) IV Intermittent every 8 hours    MEDICATIONS  (PRN):      ALLERGIES:  Allergies    No Known Allergies    Intolerances        LABS:                        9.8    20.13 )-----------( 304      ( 05 Jun 2024 02:14 )             30.2     06-05    157<H>  |  118<H>  |  23  ----------------------------<  153<H>  3.6   |  23  |  1.58<H>    Ca    9.0      05 Jun 2024 06:15  Phos  2.3     06-05  Mg     2.50     06-05    TPro  7.2  /  Alb  3.3  /  TBili  0.4  /  DBili  x   /  AST  52<H>  /  ALT  19  /  AlkPhos  119  06-05    PT/INR - ( 05 Jun 2024 02:14 )   PT: 12.9 sec;   INR: 1.15 ratio         PTT - ( 05 Jun 2024 02:14 )  PTT:21.9 sec  Urinalysis Basic - ( 05 Jun 2024 06:15 )    Color: x / Appearance: x / SG: x / pH: x  Gluc: 153 mg/dL / Ketone: x  / Bili: x / Urobili: x   Blood: x / Protein: x / Nitrite: x   Leuk Esterase: x / RBC: x / WBC x   Sq Epi: x / Non Sq Epi: x / Bacteria: x      ABG:      vBG:  pH, Venous: 7.36 (06-05-24 @ 06:15)  pCO2, Venous: 50 mmHg (06-05-24 @ 06:15)  pO2, Venous: 28 mmHg (06-05-24 @ 06:15)  HCO3, Venous: 28 mmol/L (06-05-24 @ 06:15)  pH, Venous: 7.36 (06-05-24 @ 02:14)  pCO2, Venous: 48 mmHg (06-05-24 @ 02:14)  pO2, Venous: 37 mmHg (06-05-24 @ 02:14)  HCO3, Venous: 27 mmol/L (06-05-24 @ 02:14)  pH, Venous: 7.37 (06-04-24 @ 18:00)  pCO2, Venous: 46 mmHg (06-04-24 @ 18:00)  pO2, Venous: 29 mmHg (06-04-24 @ 18:00)  HCO3, Venous: 27 mmol/L (06-04-24 @ 18:00)  pH, Venous: 7.28 (06-04-24 @ 10:00)  pCO2, Venous: 53 mmHg (06-04-24 @ 10:00)  pO2, Venous: 28 mmHg (06-04-24 @ 10:00)  HCO3, Venous: 25 mmol/L (06-04-24 @ 10:00)    Micro:    Culture - Blood (collected 06-03-24 @ 16:30)  Source: .Blood Blood-Peripheral  Preliminary Report (06-05-24 @ 01:03):    No growth at 24 hours    Culture - Blood (collected 06-03-24 @ 16:30)  Source: .Blood Blood-Peripheral  Preliminary Report (06-05-24 @ 01:03):    No growth at 24 hours          RADIOLOGY & ADDITIONAL TESTS: Reviewed.   INTERVAL HPI/OVERNIGHT EVENTS: No acute events overnight.     SUBJECTIVE: Patient seen and examined at bedside.     ROS: All negative except as listed above.    VITAL SIGNS:  ICU Vital Signs Last 24 Hrs  T(C): 37.6 (05 Jun 2024 04:00), Max: 37.7 (05 Jun 2024 00:00)  T(F): 99.7 (05 Jun 2024 04:00), Max: 99.9 (05 Jun 2024 00:00)  HR: 118 (05 Jun 2024 06:00) (106 - 132)  BP: 165/82 (05 Jun 2024 06:00) (138/63 - 186/90)  BP(mean): 101 (05 Jun 2024 06:00) (80 - 115)  ABP: --  ABP(mean): --  RR: 23 (05 Jun 2024 06:00) (18 - 31)  SpO2: 100% (05 Jun 2024 06:00) (100% - 100%)    O2 Parameters below as of 05 Jun 2024 06:00  Patient On (Oxygen Delivery Method): room air    Plateau pressure:   P/F ratio:     06-04 @ 07:01  -  06-05 @ 07:00  --------------------------------------------------------  IN: 8586.5 mL / OUT: 4495 mL / NET: 4091.5 mL    CAPILLARY BLOOD GLUCOSE  POCT Blood Glucose.: 138 mg/dL (05 Jun 2024 06:57)    ECG: reviewed.    PHYSICAL EXAM:  GENERAL: NAD, lying in bed comfortably  HEAD:  Atraumatic, normocephalic  EYES: EOMI, PERRLA, conjunctiva and sclera clear  NECK: Supple, trachea midline, no JVD  HEART: Regular rate and rhythm, no murmurs, rubs, or gallops  LUNGS: Unlabored respirations. Clear to auscultation bilaterally, no crackles, wheezing, or rhonchi  ABDOMEN: Soft, nontender, nondistended, +BS  EXTREMITIES: 2+ peripheral pulses bilaterally, cap refill<2 secs. No clubbing, cyanosis, or edema  NERVOUS SYSTEM:  A&Ox3, following commands, moving all extremities, no focal deficits   SKIN: No rashes or lesions    MEDICATIONS:  MEDICATIONS  (STANDING):  chlorhexidine 2% Cloths 1 Application(s) Topical daily  dextrose 5% + lactated ringers 1000 milliLiter(s) (150 mL/Hr) IV Continuous <Continuous>  heparin   Injectable 5000 Unit(s) SubCutaneous every 8 hours  insulin regular Infusion 4 Unit(s)/Hr (4 mL/Hr) IV Continuous <Continuous>  piperacillin/tazobactam IVPB.. 3.375 Gram(s) IV Intermittent every 8 hours    MEDICATIONS  (PRN):    ALLERGIES:  Allergies    No Known Allergies    Intolerances    LABS:                        9.8    20.13 )-----------( 304      ( 05 Jun 2024 02:14 )             30.2     06-05    157<H>  |  118<H>  |  23  ----------------------------<  153<H>  3.6   |  23  |  1.58<H>    Ca    9.0      05 Jun 2024 06:15  Phos  2.3     06-05  Mg     2.50     06-05    TPro  7.2  /  Alb  3.3  /  TBili  0.4  /  DBili  x   /  AST  52<H>  /  ALT  19  /  AlkPhos  119  06-05    PT/INR - ( 05 Jun 2024 02:14 )   PT: 12.9 sec;   INR: 1.15 ratio         PTT - ( 05 Jun 2024 02:14 )  PTT:21.9 sec  Urinalysis Basic - ( 05 Jun 2024 06:15 )    Color: x / Appearance: x / SG: x / pH: x  Gluc: 153 mg/dL / Ketone: x  / Bili: x / Urobili: x   Blood: x / Protein: x / Nitrite: x   Leuk Esterase: x / RBC: x / WBC x   Sq Epi: x / Non Sq Epi: x / Bacteria: x    ABG:      vBG:  pH, Venous: 7.36 (06-05-24 @ 06:15)  pCO2, Venous: 50 mmHg (06-05-24 @ 06:15)  pO2, Venous: 28 mmHg (06-05-24 @ 06:15)  HCO3, Venous: 28 mmol/L (06-05-24 @ 06:15)  pH, Venous: 7.36 (06-05-24 @ 02:14)  pCO2, Venous: 48 mmHg (06-05-24 @ 02:14)  pO2, Venous: 37 mmHg (06-05-24 @ 02:14)  HCO3, Venous: 27 mmol/L (06-05-24 @ 02:14)  pH, Venous: 7.37 (06-04-24 @ 18:00)  pCO2, Venous: 46 mmHg (06-04-24 @ 18:00)  pO2, Venous: 29 mmHg (06-04-24 @ 18:00)  HCO3, Venous: 27 mmol/L (06-04-24 @ 18:00)  pH, Venous: 7.28 (06-04-24 @ 10:00)  pCO2, Venous: 53 mmHg (06-04-24 @ 10:00)  pO2, Venous: 28 mmHg (06-04-24 @ 10:00)  HCO3, Venous: 25 mmol/L (06-04-24 @ 10:00)    Micro:    Culture - Blood (collected 06-03-24 @ 16:30)  Source: .Blood Blood-Peripheral  Preliminary Report (06-05-24 @ 01:03):    No growth at 24 hours    Culture - Blood (collected 06-03-24 @ 16:30)  Source: .Blood Blood-Peripheral  Preliminary Report (06-05-24 @ 01:03):    No growth at 24 hours          RADIOLOGY & ADDITIONAL TESTS: Reviewed.

## 2024-06-05 NOTE — DIETITIAN INITIAL EVALUATION ADULT - PERTINENT LABORATORY DATA
06-05    154<H>  |  115<H>  |  18  ----------------------------<  225<H>  3.3<L>   |  25  |  1.49<H>    Ca    8.9      05 Jun 2024 10:35  Phos  2.8     06-05  Mg     2.20     06-05    TPro  6.8  /  Alb  3.1<L>  /  TBili  0.4  /  DBili  x   /  AST  47<H>  /  ALT  18  /  AlkPhos  113  06-05  POCT Blood Glucose.: 193 mg/dL (06-05-24 @ 10:11)  A1C with Estimated Average Glucose Result: 16.2 % (06-04-24 @ 01:40)

## 2024-06-05 NOTE — DIETITIAN INITIAL EVALUATION ADULT - ENTER TO (G/KG)
Eusebio Santana presents today for No chief complaint on file. 1. \"Have you been to the ER, urgent care clinic since your last visit? Hospitalized since your last visit? \" no    2. \"Have you seen or consulted any other health care providers outside of the [address] [place] [address] since your last visit? \" no     3. For patients aged 43-73: Has the patient had a colonoscopy / FIT/ Cologuard? Yes - no Care Gap present      If the patient is female:    4. For patients aged 43-66: Has the patient had a mammogram within the past 2 years? NA - based on age or sex      11. For patients aged 21-65: Has the patient had a pap smear?  NA - based on age or sex hdl 36, ldl-c 163  From 2/14 showed   gluc 126, cr 0.97, gfr>60,    hba1c 6.6, umar 5.9,  chol 175, tg 196, hdl 33, ldl-c 103, wbc 4.9, hb 14.9, plt 197, tsh 2.35, psa 0.50  From 2/15 showed   gluc 106, cr 1.00, gfr>60, alt 21, hba1c 6.8,     chol 146, tg 98,   hdl 38, ldl-c 88  From 8/15 showed   gluc 109, cr 0.87, gfr>60, alt 21, hba1c 6.1,     chol 174, tg 120, hdl 39, ldl-c 111  From 8/16 showed   gluc 101, cr 0.73, gfr>60, alt 38, hba1c 6.1,    chol 134, tg 152, hdl 39, ldl-c 65,   wbc 3.7, hb 13.7, plt 196, tsh 1.40, ua neg  From 8/17 showed   gluc 97,   cr 0.81, gfr>60, alt 39,      chol 157, tg 184, hdl 39, ldl-c 81,   wbc 4.2, hb 13.9, plt 219,         psa 0.3  From 3/18 showed   gluc 107, cr 0.72, gfr>60, alt 38,      chol 155, tg 128, hdl 41, ldl-c 88  From 9/18 showed   gluc 114, cr 0.71, gfr>60, alt 39,      chol 145, tg 90,   hdl 43, ldl-c 84,   wbc 3.6, hb 13.8, plt 193  From 3/19 showed   gluc 107, cr 0.76, gfr>60, alt 39,      chol 179, tg 165, hdl 43, ldl-c 103,               psa 0.3  From 8/19 showed   gluc 101, cr 0.84, gfr>60, alt 37,      chol 152, tg 92,   hdl 42, ldl-c 92,   wbc 4.7, hb 13.5, plt 207,        psa 0.3  From 3/20 showed   gluc 110, cr 0.76, gfr>60, alt 40,      chol 170, tg 93,   hdl 46, ldl-c 105  From 10/20 showed gluc 110, cr 0.79, gfr>60, alt 41, hba1c 6.4,     chol 160, tg 138, hdl 41, ldl-c 94,   wbc 4.0, hb 14.1,p lt 215,         psa 0.3  From 4/21 showed         hba1c 6.1, umar 6,     chol 164, tg 131, hdl 40, ldl-c 98  From 10/21 showed gluc 110, cr 0.81, gfr>60, alt 36, hba1c 5.8, umar neg,               wbc 4.2, hb 13.3, plt 185,         psa 0.4  From 4/22 showed         hba1c 6.1,     chol 172, tg 133, hdl 40, ldl-c 105  From 10/22 showed gluc 104, cr 0.75, gfr>60, alt 37, hba1c 6.1, umar neg, chol 171, tg 131, hdl 46, ldl-c 99,   wbc 4.4, hb 13.5, plt 203  From 5/23 showed         alt 45, hba1c 6.2,     chol 142, tg 114, hdl 45, ldl-c 70    Results for orders placed 1.2

## 2024-06-05 NOTE — DIETITIAN INITIAL EVALUATION ADULT - ADD RECOMMEND
1) Recommend advance to PO diet as tolerated: Consistent Carbohydrate restricition  2) If alternate means of nutrition and hydration are warranted, please reconsult for EN/PN recommendations  3) Reconsult for Nutrition education  4) Continue to Monitor weights, labs, BM's, skin integrity  5) RD to f/u prn    1) Recommend advance to PO diet as medically feasible and as tolerated: Consistent Carbohydrate restriction  2) If alternate means of nutrition and hydration are warranted, please reconsult for EN/PN recommendations  3) Reconsult for Nutrition education  4) Continue to Monitor weights, labs, BM's, skin integrity  5) RD to f/u prn

## 2024-06-06 LAB
ALBUMIN SERPL ELPH-MCNC: 3 G/DL — LOW (ref 3.3–5)
ALBUMIN SERPL ELPH-MCNC: 3.2 G/DL — LOW (ref 3.3–5)
ALBUMIN SERPL ELPH-MCNC: 3.2 G/DL — LOW (ref 3.3–5)
ALBUMIN SERPL ELPH-MCNC: 3.4 G/DL — SIGNIFICANT CHANGE UP (ref 3.3–5)
ALP SERPL-CCNC: 124 U/L — HIGH (ref 40–120)
ALP SERPL-CCNC: 125 U/L — HIGH (ref 40–120)
ALP SERPL-CCNC: 129 U/L — HIGH (ref 40–120)
ALP SERPL-CCNC: 133 U/L — HIGH (ref 40–120)
ALT FLD-CCNC: 16 U/L — SIGNIFICANT CHANGE UP (ref 4–33)
ALT FLD-CCNC: 16 U/L — SIGNIFICANT CHANGE UP (ref 4–33)
ALT FLD-CCNC: 17 U/L — SIGNIFICANT CHANGE UP (ref 4–33)
ALT FLD-CCNC: 18 U/L — SIGNIFICANT CHANGE UP (ref 4–33)
ANION GAP SERPL CALC-SCNC: 12 MMOL/L — SIGNIFICANT CHANGE UP (ref 7–14)
ANION GAP SERPL CALC-SCNC: 13 MMOL/L — SIGNIFICANT CHANGE UP (ref 7–14)
ANION GAP SERPL CALC-SCNC: 14 MMOL/L — SIGNIFICANT CHANGE UP (ref 7–14)
ANION GAP SERPL CALC-SCNC: 15 MMOL/L — HIGH (ref 7–14)
APTT BLD: 27.1 SEC — SIGNIFICANT CHANGE UP (ref 24.5–35.6)
AST SERPL-CCNC: 27 U/L — SIGNIFICANT CHANGE UP (ref 4–32)
AST SERPL-CCNC: 27 U/L — SIGNIFICANT CHANGE UP (ref 4–32)
AST SERPL-CCNC: 29 U/L — SIGNIFICANT CHANGE UP (ref 4–32)
AST SERPL-CCNC: 34 U/L — HIGH (ref 4–32)
B-OH-BUTYR SERPL-SCNC: 0.4 MMOL/L — SIGNIFICANT CHANGE UP (ref 0–0.4)
B-OH-BUTYR SERPL-SCNC: 0.4 MMOL/L — SIGNIFICANT CHANGE UP (ref 0–0.4)
B-OH-BUTYR SERPL-SCNC: 0.5 MMOL/L — HIGH (ref 0–0.4)
B-OH-BUTYR SERPL-SCNC: 0.8 MMOL/L — HIGH (ref 0–0.4)
BASE EXCESS BLDV CALC-SCNC: -3.9 MMOL/L — LOW (ref -2–3)
BASE EXCESS BLDV CALC-SCNC: 5.9 MMOL/L — HIGH (ref -2–3)
BASOPHILS # BLD AUTO: 0.02 K/UL — SIGNIFICANT CHANGE UP (ref 0–0.2)
BASOPHILS NFR BLD AUTO: 0.1 % — SIGNIFICANT CHANGE UP (ref 0–2)
BILIRUB SERPL-MCNC: 0.4 MG/DL — SIGNIFICANT CHANGE UP (ref 0.2–1.2)
BILIRUB SERPL-MCNC: 0.4 MG/DL — SIGNIFICANT CHANGE UP (ref 0.2–1.2)
BILIRUB SERPL-MCNC: 0.5 MG/DL — SIGNIFICANT CHANGE UP (ref 0.2–1.2)
BILIRUB SERPL-MCNC: 0.5 MG/DL — SIGNIFICANT CHANGE UP (ref 0.2–1.2)
BLOOD GAS VENOUS COMPREHENSIVE RESULT: SIGNIFICANT CHANGE UP
BLOOD GAS VENOUS COMPREHENSIVE RESULT: SIGNIFICANT CHANGE UP
BUN SERPL-MCNC: 10 MG/DL — SIGNIFICANT CHANGE UP (ref 7–23)
BUN SERPL-MCNC: 11 MG/DL — SIGNIFICANT CHANGE UP (ref 7–23)
BUN SERPL-MCNC: 11 MG/DL — SIGNIFICANT CHANGE UP (ref 7–23)
BUN SERPL-MCNC: 14 MG/DL — SIGNIFICANT CHANGE UP (ref 7–23)
CALCIUM SERPL-MCNC: 8.2 MG/DL — LOW (ref 8.4–10.5)
CALCIUM SERPL-MCNC: 8.6 MG/DL — SIGNIFICANT CHANGE UP (ref 8.4–10.5)
CALCIUM SERPL-MCNC: 8.7 MG/DL — SIGNIFICANT CHANGE UP (ref 8.4–10.5)
CALCIUM SERPL-MCNC: 8.9 MG/DL — SIGNIFICANT CHANGE UP (ref 8.4–10.5)
CHLORIDE BLDV-SCNC: 107 MMOL/L — SIGNIFICANT CHANGE UP (ref 96–108)
CHLORIDE BLDV-SCNC: 95 MMOL/L — LOW (ref 96–108)
CHLORIDE SERPL-SCNC: 102 MMOL/L — SIGNIFICANT CHANGE UP (ref 98–107)
CHLORIDE SERPL-SCNC: 104 MMOL/L — SIGNIFICANT CHANGE UP (ref 98–107)
CHLORIDE SERPL-SCNC: 104 MMOL/L — SIGNIFICANT CHANGE UP (ref 98–107)
CHLORIDE SERPL-SCNC: 105 MMOL/L — SIGNIFICANT CHANGE UP (ref 98–107)
CO2 BLDV-SCNC: 21.6 MMOL/L — LOW (ref 22–26)
CO2 BLDV-SCNC: 30.9 MMOL/L — HIGH (ref 22–26)
CO2 SERPL-SCNC: 25 MMOL/L — SIGNIFICANT CHANGE UP (ref 22–31)
CO2 SERPL-SCNC: 25 MMOL/L — SIGNIFICANT CHANGE UP (ref 22–31)
CO2 SERPL-SCNC: 26 MMOL/L — SIGNIFICANT CHANGE UP (ref 22–31)
CO2 SERPL-SCNC: 27 MMOL/L — SIGNIFICANT CHANGE UP (ref 22–31)
CREAT SERPL-MCNC: 1 MG/DL — SIGNIFICANT CHANGE UP (ref 0.5–1.3)
CREAT SERPL-MCNC: 1.03 MG/DL — SIGNIFICANT CHANGE UP (ref 0.5–1.3)
CREAT SERPL-MCNC: 1.08 MG/DL — SIGNIFICANT CHANGE UP (ref 0.5–1.3)
CREAT SERPL-MCNC: 1.19 MG/DL — SIGNIFICANT CHANGE UP (ref 0.5–1.3)
EGFR: 59 ML/MIN/1.73M2 — LOW
EGFR: 67 ML/MIN/1.73M2 — SIGNIFICANT CHANGE UP
EGFR: 70 ML/MIN/1.73M2 — SIGNIFICANT CHANGE UP
EGFR: 73 ML/MIN/1.73M2 — SIGNIFICANT CHANGE UP
EOSINOPHIL # BLD AUTO: 0.02 K/UL — SIGNIFICANT CHANGE UP (ref 0–0.5)
EOSINOPHIL NFR BLD AUTO: 0.1 % — SIGNIFICANT CHANGE UP (ref 0–6)
GAS PNL BLDV: 122 MMOL/L — LOW (ref 136–145)
GAS PNL BLDV: 141 MMOL/L — SIGNIFICANT CHANGE UP (ref 136–145)
GLUCOSE BLDC GLUCOMTR-MCNC: 182 MG/DL — HIGH (ref 70–99)
GLUCOSE BLDC GLUCOMTR-MCNC: 182 MG/DL — HIGH (ref 70–99)
GLUCOSE BLDC GLUCOMTR-MCNC: 186 MG/DL — HIGH (ref 70–99)
GLUCOSE BLDC GLUCOMTR-MCNC: 187 MG/DL — HIGH (ref 70–99)
GLUCOSE BLDC GLUCOMTR-MCNC: 194 MG/DL — HIGH (ref 70–99)
GLUCOSE BLDC GLUCOMTR-MCNC: 203 MG/DL — HIGH (ref 70–99)
GLUCOSE BLDC GLUCOMTR-MCNC: 213 MG/DL — HIGH (ref 70–99)
GLUCOSE BLDC GLUCOMTR-MCNC: 239 MG/DL — HIGH (ref 70–99)
GLUCOSE BLDC GLUCOMTR-MCNC: 255 MG/DL — HIGH (ref 70–99)
GLUCOSE BLDC GLUCOMTR-MCNC: 256 MG/DL — HIGH (ref 70–99)
GLUCOSE BLDC GLUCOMTR-MCNC: 258 MG/DL — HIGH (ref 70–99)
GLUCOSE BLDC GLUCOMTR-MCNC: 267 MG/DL — HIGH (ref 70–99)
GLUCOSE BLDC GLUCOMTR-MCNC: 290 MG/DL — HIGH (ref 70–99)
GLUCOSE BLDC GLUCOMTR-MCNC: 294 MG/DL — HIGH (ref 70–99)
GLUCOSE BLDC GLUCOMTR-MCNC: 311 MG/DL — HIGH (ref 70–99)
GLUCOSE BLDC GLUCOMTR-MCNC: 345 MG/DL — HIGH (ref 70–99)
GLUCOSE BLDC GLUCOMTR-MCNC: 347 MG/DL — HIGH (ref 70–99)
GLUCOSE BLDC GLUCOMTR-MCNC: 563 MG/DL — CRITICAL HIGH (ref 70–99)
GLUCOSE BLDV-MCNC: 187 MG/DL — HIGH (ref 70–99)
GLUCOSE BLDV-MCNC: 332 MG/DL — HIGH (ref 70–99)
GLUCOSE SERPL-MCNC: 200 MG/DL — HIGH (ref 70–99)
GLUCOSE SERPL-MCNC: 204 MG/DL — HIGH (ref 70–99)
GLUCOSE SERPL-MCNC: 245 MG/DL — HIGH (ref 70–99)
GLUCOSE SERPL-MCNC: 331 MG/DL — HIGH (ref 70–99)
HCO3 BLDV-SCNC: 21 MMOL/L — LOW (ref 22–29)
HCO3 BLDV-SCNC: 30 MMOL/L — HIGH (ref 22–29)
HCT VFR BLD CALC: 28.3 % — LOW (ref 34.5–45)
HCT VFR BLDA CALC: 23 % — LOW (ref 34.5–46.5)
HCT VFR BLDA CALC: 28 % — LOW (ref 34.5–46.5)
HGB BLD CALC-MCNC: 7.5 G/DL — LOW (ref 11.7–16.1)
HGB BLD CALC-MCNC: 9.3 G/DL — LOW (ref 11.7–16.1)
HGB BLD-MCNC: 9.2 G/DL — LOW (ref 11.5–15.5)
IANC: 12.01 K/UL — HIGH (ref 1.8–7.4)
IMM GRANULOCYTES NFR BLD AUTO: 0.7 % — SIGNIFICANT CHANGE UP (ref 0–0.9)
INR BLD: 1.16 RATIO — SIGNIFICANT CHANGE UP (ref 0.85–1.18)
LACTATE BLDV-MCNC: 1.4 MMOL/L — SIGNIFICANT CHANGE UP (ref 0.5–2)
LACTATE BLDV-MCNC: 2.2 MMOL/L — HIGH (ref 0.5–2)
LYMPHOCYTES # BLD AUTO: 1.99 K/UL — SIGNIFICANT CHANGE UP (ref 1–3.3)
LYMPHOCYTES # BLD AUTO: 13.3 % — SIGNIFICANT CHANGE UP (ref 13–44)
MAGNESIUM SERPL-MCNC: 2.1 MG/DL — SIGNIFICANT CHANGE UP (ref 1.6–2.6)
MAGNESIUM SERPL-MCNC: 2.2 MG/DL — SIGNIFICANT CHANGE UP (ref 1.6–2.6)
MAGNESIUM SERPL-MCNC: 2.3 MG/DL — SIGNIFICANT CHANGE UP (ref 1.6–2.6)
MAGNESIUM SERPL-MCNC: 2.5 MG/DL — SIGNIFICANT CHANGE UP (ref 1.6–2.6)
MCHC RBC-ENTMCNC: 23.8 PG — LOW (ref 27–34)
MCHC RBC-ENTMCNC: 32.5 GM/DL — SIGNIFICANT CHANGE UP (ref 32–36)
MCV RBC AUTO: 73.3 FL — LOW (ref 80–100)
MONOCYTES # BLD AUTO: 0.79 K/UL — SIGNIFICANT CHANGE UP (ref 0–0.9)
MONOCYTES NFR BLD AUTO: 5.3 % — SIGNIFICANT CHANGE UP (ref 2–14)
NEUTROPHILS # BLD AUTO: 12.01 K/UL — HIGH (ref 1.8–7.4)
NEUTROPHILS NFR BLD AUTO: 80.5 % — HIGH (ref 43–77)
NRBC # BLD: 0 /100 WBCS — SIGNIFICANT CHANGE UP (ref 0–0)
NRBC # FLD: 0.03 K/UL — HIGH (ref 0–0)
PCO2 BLDV: 34 MMHG — LOW (ref 39–52)
PCO2 BLDV: 39 MMHG — SIGNIFICANT CHANGE UP (ref 39–52)
PH BLDV: 7.39 — SIGNIFICANT CHANGE UP (ref 7.32–7.43)
PH BLDV: 7.49 — HIGH (ref 7.32–7.43)
PHOSPHATE SERPL-MCNC: 2.9 MG/DL — SIGNIFICANT CHANGE UP (ref 2.5–4.5)
PHOSPHATE SERPL-MCNC: 3 MG/DL — SIGNIFICANT CHANGE UP (ref 2.5–4.5)
PHOSPHATE SERPL-MCNC: 3.1 MG/DL — SIGNIFICANT CHANGE UP (ref 2.5–4.5)
PHOSPHATE SERPL-MCNC: 3.2 MG/DL — SIGNIFICANT CHANGE UP (ref 2.5–4.5)
PLATELET # BLD AUTO: 230 K/UL — SIGNIFICANT CHANGE UP (ref 150–400)
PO2 BLDV: 53 MMHG — HIGH (ref 25–45)
PO2 BLDV: 67 MMHG — HIGH (ref 25–45)
POTASSIUM BLDV-SCNC: 3.4 MMOL/L — LOW (ref 3.5–5.1)
POTASSIUM BLDV-SCNC: 3.5 MMOL/L — SIGNIFICANT CHANGE UP (ref 3.5–5.1)
POTASSIUM SERPL-MCNC: 3.2 MMOL/L — LOW (ref 3.5–5.3)
POTASSIUM SERPL-MCNC: 3.4 MMOL/L — LOW (ref 3.5–5.3)
POTASSIUM SERPL-MCNC: 3.6 MMOL/L — SIGNIFICANT CHANGE UP (ref 3.5–5.3)
POTASSIUM SERPL-MCNC: 4.2 MMOL/L — SIGNIFICANT CHANGE UP (ref 3.5–5.3)
POTASSIUM SERPL-SCNC: 3.2 MMOL/L — LOW (ref 3.5–5.3)
POTASSIUM SERPL-SCNC: 3.4 MMOL/L — LOW (ref 3.5–5.3)
POTASSIUM SERPL-SCNC: 3.6 MMOL/L — SIGNIFICANT CHANGE UP (ref 3.5–5.3)
POTASSIUM SERPL-SCNC: 4.2 MMOL/L — SIGNIFICANT CHANGE UP (ref 3.5–5.3)
PROT SERPL-MCNC: 6.6 G/DL — SIGNIFICANT CHANGE UP (ref 6–8.3)
PROT SERPL-MCNC: 6.8 G/DL — SIGNIFICANT CHANGE UP (ref 6–8.3)
PROT SERPL-MCNC: 7.1 G/DL — SIGNIFICANT CHANGE UP (ref 6–8.3)
PROT SERPL-MCNC: 7.6 G/DL — SIGNIFICANT CHANGE UP (ref 6–8.3)
PROTHROM AB SERPL-ACNC: 12.9 SEC — SIGNIFICANT CHANGE UP (ref 9.5–13)
RBC # BLD: 3.86 M/UL — SIGNIFICANT CHANGE UP (ref 3.8–5.2)
RBC # FLD: 17.2 % — HIGH (ref 10.3–14.5)
SAO2 % BLDV: 82.7 % — SIGNIFICANT CHANGE UP (ref 67–88)
SAO2 % BLDV: 94.5 % — HIGH (ref 67–88)
SODIUM SERPL-SCNC: 139 MMOL/L — SIGNIFICANT CHANGE UP (ref 135–145)
SODIUM SERPL-SCNC: 144 MMOL/L — SIGNIFICANT CHANGE UP (ref 135–145)
SODIUM SERPL-SCNC: 144 MMOL/L — SIGNIFICANT CHANGE UP (ref 135–145)
SODIUM SERPL-SCNC: 145 MMOL/L — SIGNIFICANT CHANGE UP (ref 135–145)
WBC # BLD: 14.93 K/UL — HIGH (ref 3.8–10.5)
WBC # FLD AUTO: 14.93 K/UL — HIGH (ref 3.8–10.5)

## 2024-06-06 PROCEDURE — 99232 SBSQ HOSP IP/OBS MODERATE 35: CPT

## 2024-06-06 PROCEDURE — 99291 CRITICAL CARE FIRST HOUR: CPT | Mod: GC

## 2024-06-06 RX ORDER — INSULIN LISPRO 100/ML
12 VIAL (ML) SUBCUTANEOUS
Refills: 0 | Status: DISCONTINUED | OUTPATIENT
Start: 2024-06-06 | End: 2024-06-07

## 2024-06-06 RX ORDER — POTASSIUM CHLORIDE 20 MEQ
40 PACKET (EA) ORAL EVERY 4 HOURS
Refills: 0 | Status: DISCONTINUED | OUTPATIENT
Start: 2024-06-06 | End: 2024-06-06

## 2024-06-06 RX ORDER — INSULIN LISPRO 100/ML
VIAL (ML) SUBCUTANEOUS AT BEDTIME
Refills: 0 | Status: DISCONTINUED | OUTPATIENT
Start: 2024-06-06 | End: 2024-06-11

## 2024-06-06 RX ORDER — INSULIN GLARGINE 100 [IU]/ML
40 INJECTION, SOLUTION SUBCUTANEOUS ONCE
Refills: 0 | Status: COMPLETED | OUTPATIENT
Start: 2024-06-06 | End: 2024-06-06

## 2024-06-06 RX ORDER — POTASSIUM CHLORIDE 20 MEQ
10 PACKET (EA) ORAL
Refills: 0 | Status: COMPLETED | OUTPATIENT
Start: 2024-06-06 | End: 2024-06-06

## 2024-06-06 RX ORDER — ACETAMINOPHEN 500 MG
1000 TABLET ORAL ONCE
Refills: 0 | Status: COMPLETED | OUTPATIENT
Start: 2024-06-06 | End: 2024-06-06

## 2024-06-06 RX ORDER — POTASSIUM CHLORIDE 20 MEQ
40 PACKET (EA) ORAL ONCE
Refills: 0 | Status: COMPLETED | OUTPATIENT
Start: 2024-06-06 | End: 2024-06-06

## 2024-06-06 RX ORDER — INSULIN LISPRO 100/ML
VIAL (ML) SUBCUTANEOUS
Refills: 0 | Status: DISCONTINUED | OUTPATIENT
Start: 2024-06-06 | End: 2024-06-11

## 2024-06-06 RX ORDER — SODIUM CHLORIDE 9 MG/ML
1000 INJECTION, SOLUTION INTRAVENOUS ONCE
Refills: 0 | Status: COMPLETED | OUTPATIENT
Start: 2024-06-06 | End: 2024-06-06

## 2024-06-06 RX ORDER — SODIUM CHLORIDE 9 MG/ML
1000 INJECTION, SOLUTION INTRAVENOUS
Refills: 0 | Status: DISCONTINUED | OUTPATIENT
Start: 2024-06-06 | End: 2024-06-06

## 2024-06-06 RX ADMIN — Medication 40 MILLIEQUIVALENT(S): at 05:49

## 2024-06-06 RX ADMIN — HEPARIN SODIUM 5000 UNIT(S): 5000 INJECTION INTRAVENOUS; SUBCUTANEOUS at 14:30

## 2024-06-06 RX ADMIN — Medication 1000 MILLIGRAM(S): at 23:58

## 2024-06-06 RX ADMIN — HEPARIN SODIUM 5000 UNIT(S): 5000 INJECTION INTRAVENOUS; SUBCUTANEOUS at 23:18

## 2024-06-06 RX ADMIN — Medication 100 MILLIEQUIVALENT(S): at 03:33

## 2024-06-06 RX ADMIN — Medication 100 MILLIGRAM(S): at 05:49

## 2024-06-06 RX ADMIN — SODIUM CHLORIDE 1000 MILLILITER(S): 9 INJECTION, SOLUTION INTRAVENOUS at 10:18

## 2024-06-06 RX ADMIN — Medication 12 UNIT(S): at 15:04

## 2024-06-06 RX ADMIN — INSULIN GLARGINE 40 UNIT(S): 100 INJECTION, SOLUTION SUBCUTANEOUS at 12:54

## 2024-06-06 RX ADMIN — Medication 100 MILLIEQUIVALENT(S): at 04:37

## 2024-06-06 RX ADMIN — SODIUM CHLORIDE 75 MILLILITER(S): 9 INJECTION, SOLUTION INTRAVENOUS at 10:18

## 2024-06-06 RX ADMIN — INSULIN HUMAN 7 UNIT(S)/HR: 100 INJECTION, SOLUTION SUBCUTANEOUS at 10:18

## 2024-06-06 RX ADMIN — Medication 400 MILLIGRAM(S): at 23:18

## 2024-06-06 RX ADMIN — CHLORHEXIDINE GLUCONATE 1 APPLICATION(S): 213 SOLUTION TOPICAL at 12:07

## 2024-06-06 RX ADMIN — Medication 2: at 23:23

## 2024-06-06 RX ADMIN — SODIUM CHLORIDE 1000 MILLILITER(S): 9 INJECTION, SOLUTION INTRAVENOUS at 18:27

## 2024-06-06 RX ADMIN — Medication 2: at 15:05

## 2024-06-06 RX ADMIN — INSULIN HUMAN 5 UNIT(S)/HR: 100 INJECTION, SOLUTION SUBCUTANEOUS at 02:29

## 2024-06-06 RX ADMIN — HEPARIN SODIUM 5000 UNIT(S): 5000 INJECTION INTRAVENOUS; SUBCUTANEOUS at 05:49

## 2024-06-06 RX ADMIN — Medication 100 MILLIEQUIVALENT(S): at 05:58

## 2024-06-06 NOTE — PHARMACOTHERAPY INTERVENTION NOTE - COMMENTS
Called patient's  Taylor to see if he could join for patient education (as previous notes said patient was A&Ox0) - he states he is working in [facility] and will return Saturday to the hospital. Counseled him to have the bedside RN show him how to use insulin/diabetes teaching when he arrives. He states when he spoke with the patient today she spoke clearly and not confused. Began education with patient regarding her A1c, goal A1c, S&S hyperglycemia (+dry mouth, +polydipsia, +polyuria), basal/bolus insulin pen administration (and how they work), hypoglycemia and treatment, goal blood glucose, how to use glucometer (states her  uses a glucometer), and when to check BG, pt did not do return demonstration but will practice at a later time (due to tiredness and IVs) and verbalized understanding. Counseled pt on where to inject insulin (abdomen, outer thighs), rotating injection site to prevent lipodystrophy, proper insulin storage, and sharps disposal. Patient was tired during education session but was able to repeat back information correctly - will need continued reinforcement and return demonstration of insulin pen, RN aware. Pt encouraged for outpt f/u with medicine (has PCP) and endocrine (needs Endo - her insurance isn't accepted at [facility]).  Called patient's  Taylor to see if he could join for patient education (as previous notes said patient was A&Ox0) - he states he is working in [facility] and will return Saturday to the hospital. Counseled him to have the bedside RN show him how to use insulin/diabetes teaching when he arrives. He states when he spoke with the patient today she spoke clearly and not confused. Began education with patient regarding her A1c, goal A1c, S&S hyperglycemia (+dry mouth, +polydipsia, +polyuria), basal/bolus insulin pen administration (and how they work), hypoglycemia and treatment, goal blood glucose, how to use glucometer (states her  uses a glucometer), and when to check BG, pt did not do return demonstration but will practice at a later time (due to tiredness and IVs) and verbalized understanding. Counseled pt on where to inject insulin (abdomen, outer thighs), rotating injection site to prevent lipodystrophy, proper insulin storage, and sharps disposal. Patient was tired during education session but was able to repeat back information correctly - will need continued reinforcement and return demonstration of insulin pen, RN aware. Pt encouraged for outpt f/u with medicine (has PCP) and endocrine (needs Endo - her insurance isn't accepted at [facility]).     Endocrine appt made: July 10th at 2PM with Dr[facility]ah [facility] [address] [place] [address], [phone] - bring HbA1c and referral

## 2024-06-06 NOTE — PROGRESS NOTE ADULT - ASSESSMENT
40 y/o F w/ PMHx of HTN who presented after found down at home unresponsive in bed with emesis on face, admitted to MICU for DKA. a1c 16.2%, started on insulin gtt. 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%)  Home meds: none  - Glucose 140-180mg/dl: above goal received lantus 14 yesterday at 5pm then was place back on insulin gtt   - S/p insulin gtt; please transition off insulin gtt with Lantus 40 units stat (then continue daily at same time) then stop insulin gtt 2 hours after Lantus dose is given; stop Dextrose IVF immediately   - If eating can add Admelog 12 units TID AC (please hold if npo/not eating) advised team to wait to feed pt once insulin gtt is off then give Admelog standing dose prior to eating   - Add Admelog moderate correction scale tid ac and seperate Admelog moderate correction scale at bedtime   - FS before meals and at bedtime   - Consistent carb diet   - RD consult  - Hypoglycemia Protocol   - given patient with acute DKA - should check Latent autoimmune diabetes of adults (KELLY) antibodies: zinc transporter antibody, glutamic acid decarboxylase antibody, IA-2 antibody (specimen received awaiting results); will check c-peptide when glucose controlled and closer to dc as c-peptide can be falsely low with recent DKA   - Nutrition consult and diabetes/insulin pen teaching prior to discharge given this is NEW diabetes diagnosis, including glucometer teaching.  - Transition of care pharmacist following: please refer to pharmacotherapy note    - DISCHARGE RECOMMENDATIONS: basal-bolus regimen, doses TBD.     Pt will need prescription for basal insulin pen (ie. Lantus Solostar pen, if not covered then ask pharmacy which brand of basal insulin is covered by their insurance plan - other brands include: Basaglar, Lantus, Tresiba, Toujeo) and bolus insulin pen (ie. Humalog Kwikpen , if not covered then ask pharmacy which brand of bolus insulin is covered by their insurance plan - other brands include: Novolog, Admelog). Dispense 5 insulin pens with 3 refills. Please send prescriptions for diabetes supplies (glucometer, test strips, lancets, alcohol swabs, insulin pen needles) - dispense #100, use as directed (3 refills).    - Recommend CGM discuss further with patient when able.    - OUT-PATIENT FOLLOW UP: Patient should follow up with Endocrinology office ([phone]) at [address] [place] [phone]; will clarify with pt if ok to email office for an appointment     #HLD  - LDL 61  - goal LDL in T2DM is <70    #HTN  - goal BP in T2DM is <130/80  - please obtain urine micro albumin cr ratio as an outpt   - defer to primary team    D/w MICU team   D/w Dr. Johnson Endocrinologist     D/w Dr. Luz Marina Renee  Nurse Practitioner  Division of Endocrinology & Diabetes  In house pager #[phone]    If before 9AM or after 6PM, or on weekends/holidays, please call endocrine answering service for assistance ([phone]).For nonurgent matters email [email or web address] for assistance.

## 2024-06-06 NOTE — PROGRESS NOTE ADULT - ATTENDING COMMENTS
39 F with no significant PMH who presents with DKA with diabetic coma, hypernatremia, OSMAN, lactic acidosis, and sinus tachycardia. Now significantly improved.    #Neuro: resolved encephalopathy, now AAOx3. CT head negative    #CV: HD stable. HTN, start labetalol 100 mg bid. Eventually can transition to ACEi/ARB as OSMAN resolves    #Pulm: on room air with SpO2>92%    #GI: NPO until DKA resolves, then start diabetic diet. Ok to have water     #Renal: OSMAN likely prerenal vs. ATN, now improved. Currently with polyuria. IVF hydration, change to D5NS@75mL/hour. Improved hypernatremia, continue to monitor    #ID: can monitor off abx for now     #Heme: HSQ for DVT ppx    #Endo: restarted inuslin gtt overnight for opening AG/BHB, will discuss with endocrine re: NPH until she can get higher lantus tonight.    #Dispo: full code, discussed with patient at bedside
39F with no significant PMH who presents with DKA with diabetic coma, hypernatremia, OSMAN, lactic acidosis, and sinus tachycardia. Now significantly improved.    #Neuro: resolved encephalopathy, now AAOx3. CT head negative  #CV: HD stable. HTN, start labetalol 100 mg bid. Eventually can transition to ACEi/ARB as OSMAN resolves  #Pulm: on room air with SpO2>92%  #GI: NPO until DKA resolves, then start diabetic diet. Ok to have water if passes bedside swallow  #Renal: OSMAN likely prerenal vs. ATN, now improved. Currently with polyuria. IVF hydration, change to 1/2NS@75mL/hour. Improved hypernatremia, continue to monitor  #ID: Continue Zosyn to complete 5 days. UA negative. CXR with clear lungs. BCx with NGTD. UCx negative. Monitor leukocytosis  #Heme: HSQ for DVT ppx  #Endo: remains on insulin gtt, bridge to Lantus + pre-meal Lispro + insulin coverage scale as AG closes  #Dispo: full code, discussed with patient at bedside  CC time spent: 35 min
40 y/o F with PMHx who was found down with AMS, found to have HAGMA/OSMAN/lactic acidosis due to DKA, acute metabolic encephalopathy due to the same, including hypernatremia    on insulin gtt, q1h finger sticks  c/w IVF, give boluses for hypernatremia on top of d5  monitor for hypokalemia and hypophosphatemia   c/w zosyn for concern for sepsis due to UTI  endocrine consult      lvsf normal  full code per  on phone  hsq

## 2024-06-06 NOTE — PROGRESS NOTE ADULT - SUBJECTIVE AND OBJECTIVE BOX
INTERVAL HPI/OVERNIGHT EVENTS:    SUBJECTIVE: Patient seen and examined at bedside.     ROS: All negative except as listed above.    VITAL SIGNS:  ICU Vital Signs Last 24 Hrs  T(C): 37.7 (06 Jun 2024 04:00), Max: 37.7 (06 Jun 2024 04:00)  T(F): 99.8 (06 Jun 2024 04:00), Max: 99.8 (06 Jun 2024 04:00)  HR: 124 (06 Jun 2024 06:00) (116 - 129)  BP: 163/144 (06 Jun 2024 06:00) (145/82 - 186/98)  BP(mean): 148 (06 Jun 2024 06:00) (93 - 148)  ABP: --  ABP(mean): --  RR: 32 (06 Jun 2024 06:00) (22 - 39)  SpO2: 100% (06 Jun 2024 06:00) (99% - 100%)    O2 Parameters below as of 06 Jun 2024 06:00  Patient On (Oxygen Delivery Method): room air            Plateau pressure:   P/F ratio:     06-05 @ 07:01  -  06-06 @ 07:00  --------------------------------------------------------  IN: 4467 mL / OUT: 6450 mL / NET: -1983 mL      CAPILLARY BLOOD GLUCOSE      POCT Blood Glucose.: 258 mg/dL (06 Jun 2024 06:35)      ECG: reviewed.    PHYSICAL EXAM:    GENERAL: NAD, lying in bed comfortably  HEAD:  Atraumatic, normocephalic  EYES: EOMI, PERRLA, conjunctiva and sclera clear  NECK: Supple, trachea midline, no JVD  HEART: Regular rate and rhythm, no murmurs, rubs, or gallops  LUNGS: Unlabored respirations.  Clear to auscultation bilaterally, no crackles, wheezing, or rhonchi  ABDOMEN: Soft, nontender, nondistended, +BS  EXTREMITIES: 2+ peripheral pulses bilaterally, cap refill<2 secs. No clubbing, cyanosis, or edema  NERVOUS SYSTEM:  A&Ox3, following commands, moving all extremities, no focal deficits   SKIN: No rashes or lesions    MEDICATIONS:  MEDICATIONS  (STANDING):  chlorhexidine 2% Cloths 1 Application(s) Topical daily  dextrose 10% Bolus 125 milliLiter(s) IV Bolus once  dextrose 5%. 1000 milliLiter(s) (100 mL/Hr) IV Continuous <Continuous>  dextrose 5%. 1000 milliLiter(s) (50 mL/Hr) IV Continuous <Continuous>  dextrose 50% Injectable 25 Gram(s) IV Push once  dextrose 50% Injectable 12.5 Gram(s) IV Push once  glucagon  Injectable 1 milliGRAM(s) IntraMuscular once  heparin   Injectable 5000 Unit(s) SubCutaneous every 8 hours  insulin regular Infusion 6 Unit(s)/Hr (6 mL/Hr) IV Continuous <Continuous>  labetalol 100 milliGRAM(s) Oral two times a day  lactated ringers Bolus 1000 milliLiter(s) IV Bolus once  sodium chloride 0.45%. 1000 milliLiter(s) (75 mL/Hr) IV Continuous <Continuous>    MEDICATIONS  (PRN):  dextrose Oral Gel 15 Gram(s) Oral once PRN Blood Glucose LESS THAN 70 milliGRAM(s)/deciliter      ALLERGIES:  Allergies    No Known Allergies    Intolerances        LABS:                        9.2    14.93 )-----------( 230      ( 06 Jun 2024 02:00 )             28.3     06-06    144  |  104  |  14  ----------------------------<  331<H>  3.4<L>   |  26  |  1.19    Ca    8.7      06 Jun 2024 02:00  Phos  2.9     06-06  Mg     2.50     06-06    TPro  6.8  /  Alb  3.2<L>  /  TBili  0.5  /  DBili  x   /  AST  34<H>  /  ALT  18  /  AlkPhos  125<H>  06-06    PT/INR - ( 06 Jun 2024 02:00 )   PT: 12.9 sec;   INR: 1.16 ratio         PTT - ( 06 Jun 2024 02:00 )  PTT:27.1 sec  Urinalysis Basic - ( 06 Jun 2024 02:00 )    Color: x / Appearance: x / SG: x / pH: x  Gluc: 331 mg/dL / Ketone: x  / Bili: x / Urobili: x   Blood: x / Protein: x / Nitrite: x   Leuk Esterase: x / RBC: x / WBC x   Sq Epi: x / Non Sq Epi: x / Bacteria: x      ABG:      vBG:  pH, Venous: 7.49 (06-06-24 @ 02:00)  pCO2, Venous: 39 mmHg (06-06-24 @ 02:00)  pO2, Venous: 67 mmHg (06-06-24 @ 02:00)  HCO3, Venous: 30 mmol/L (06-06-24 @ 02:00)  pH, Venous: 7.45 (06-05-24 @ 21:10)  pCO2, Venous: 39 mmHg (06-05-24 @ 21:10)  pO2, Venous: 50 mmHg (06-05-24 @ 21:10)  HCO3, Venous: 27 mmol/L (06-05-24 @ 21:10)  pH, Venous: 7.46 (06-05-24 @ 14:15)  pCO2, Venous: 39 mmHg (06-05-24 @ 14:15)  pO2, Venous: 44 mmHg (06-05-24 @ 14:15)  HCO3, Venous: 28 mmol/L (06-05-24 @ 14:15)  pH, Venous: 7.47 (06-05-24 @ 10:35)  pCO2, Venous: 38 mmHg (06-05-24 @ 10:35)  pO2, Venous: 65 mmHg (06-05-24 @ 10:35)  HCO3, Venous: 28 mmol/L (06-05-24 @ 10:35)    Micro:    Culture - Blood (collected 06-03-24 @ 16:30)  Source: .Blood Blood-Peripheral  Preliminary Report (06-05-24 @ 23:11):    No growth at 48 Hours    Culture - Blood (collected 06-03-24 @ 16:30)  Source: .Blood Blood-Peripheral  Preliminary Report (06-05-24 @ 23:11):    No growth at 48 Hours          RADIOLOGY & ADDITIONAL TESTS: Reviewed.   INTERVAL HPI/OVERNIGHT EVENTS: Yesterday evening with increasing blood sugar and AG.     SUBJECTIVE: Patient seen and examined at bedside.     ROS: All negative except as listed above.    VITAL SIGNS:  ICU Vital Signs Last 24 Hrs  T(C): 37.7 (06 Jun 2024 04:00), Max: 37.7 (06 Jun 2024 04:00)  T(F): 99.8 (06 Jun 2024 04:00), Max: 99.8 (06 Jun 2024 04:00)  HR: 124 (06 Jun 2024 06:00) (116 - 129)  BP: 163/144 (06 Jun 2024 06:00) (145/82 - 186/98)  BP(mean): 148 (06 Jun 2024 06:00) (93 - 148)  ABP: --  ABP(mean): --  RR: 32 (06 Jun 2024 06:00) (22 - 39)  SpO2: 100% (06 Jun 2024 06:00) (99% - 100%)    O2 Parameters below as of 06 Jun 2024 06:00  Patient On (Oxygen Delivery Method): room air            Plateau pressure:   P/F ratio:     06-05 @ 07:01  -  06-06 @ 07:00  --------------------------------------------------------  IN: 4467 mL / OUT: 6450 mL / NET: -1983 mL      CAPILLARY BLOOD GLUCOSE      POCT Blood Glucose.: 258 mg/dL (06 Jun 2024 06:35)      ECG: reviewed.    PHYSICAL EXAM:    GENERAL: NAD, lying in bed comfortably  HEAD:  Atraumatic, normocephalic  EYES: EOMI, PERRLA, conjunctiva and sclera clear  NECK: Supple, trachea midline, no JVD  HEART: Regular rate and rhythm, no murmurs, rubs, or gallops  LUNGS: Unlabored respirations.  Clear to auscultation bilaterally, no crackles, wheezing, or rhonchi  ABDOMEN: Soft, nontender, nondistended, +BS  EXTREMITIES: 2+ peripheral pulses bilaterally, cap refill<2 secs. No clubbing, cyanosis, or edema  NERVOUS SYSTEM:  A&Ox3, following commands, moving all extremities, no focal deficits   SKIN: No rashes or lesions    MEDICATIONS:  MEDICATIONS  (STANDING):  chlorhexidine 2% Cloths 1 Application(s) Topical daily  dextrose 10% Bolus 125 milliLiter(s) IV Bolus once  dextrose 5%. 1000 milliLiter(s) (100 mL/Hr) IV Continuous <Continuous>  dextrose 5%. 1000 milliLiter(s) (50 mL/Hr) IV Continuous <Continuous>  dextrose 50% Injectable 25 Gram(s) IV Push once  dextrose 50% Injectable 12.5 Gram(s) IV Push once  glucagon  Injectable 1 milliGRAM(s) IntraMuscular once  heparin   Injectable 5000 Unit(s) SubCutaneous every 8 hours  insulin regular Infusion 6 Unit(s)/Hr (6 mL/Hr) IV Continuous <Continuous>  labetalol 100 milliGRAM(s) Oral two times a day  lactated ringers Bolus 1000 milliLiter(s) IV Bolus once  sodium chloride 0.45%. 1000 milliLiter(s) (75 mL/Hr) IV Continuous <Continuous>    MEDICATIONS  (PRN):  dextrose Oral Gel 15 Gram(s) Oral once PRN Blood Glucose LESS THAN 70 milliGRAM(s)/deciliter      ALLERGIES:  Allergies    No Known Allergies    Intolerances        LABS:                        9.2    14.93 )-----------( 230      ( 06 Jun 2024 02:00 )             28.3     06-06    144  |  104  |  14  ----------------------------<  331<H>  3.4<L>   |  26  |  1.19    Ca    8.7      06 Jun 2024 02:00  Phos  2.9     06-06  Mg     2.50     06-06    TPro  6.8  /  Alb  3.2<L>  /  TBili  0.5  /  DBili  x   /  AST  34<H>  /  ALT  18  /  AlkPhos  125<H>  06-06    PT/INR - ( 06 Jun 2024 02:00 )   PT: 12.9 sec;   INR: 1.16 ratio         PTT - ( 06 Jun 2024 02:00 )  PTT:27.1 sec  Urinalysis Basic - ( 06 Jun 2024 02:00 )    Color: x / Appearance: x / SG: x / pH: x  Gluc: 331 mg/dL / Ketone: x  / Bili: x / Urobili: x   Blood: x / Protein: x / Nitrite: x   Leuk Esterase: x / RBC: x / WBC x   Sq Epi: x / Non Sq Epi: x / Bacteria: x      ABG:      vBG:  pH, Venous: 7.49 (06-06-24 @ 02:00)  pCO2, Venous: 39 mmHg (06-06-24 @ 02:00)  pO2, Venous: 67 mmHg (06-06-24 @ 02:00)  HCO3, Venous: 30 mmol/L (06-06-24 @ 02:00)  pH, Venous: 7.45 (06-05-24 @ 21:10)  pCO2, Venous: 39 mmHg (06-05-24 @ 21:10)  pO2, Venous: 50 mmHg (06-05-24 @ 21:10)  HCO3, Venous: 27 mmol/L (06-05-24 @ 21:10)  pH, Venous: 7.46 (06-05-24 @ 14:15)  pCO2, Venous: 39 mmHg (06-05-24 @ 14:15)  pO2, Venous: 44 mmHg (06-05-24 @ 14:15)  HCO3, Venous: 28 mmol/L (06-05-24 @ 14:15)  pH, Venous: 7.47 (06-05-24 @ 10:35)  pCO2, Venous: 38 mmHg (06-05-24 @ 10:35)  pO2, Venous: 65 mmHg (06-05-24 @ 10:35)  HCO3, Venous: 28 mmol/L (06-05-24 @ 10:35)    Micro:    Culture - Blood (collected 06-03-24 @ 16:30)  Source: .Blood Blood-Peripheral  Preliminary Report (06-05-24 @ 23:11):    No growth at 48 Hours    Culture - Blood (collected 06-03-24 @ 16:30)  Source: .Blood Blood-Peripheral  Preliminary Report (06-05-24 @ 23:11):    No growth at 48 Hours          RADIOLOGY & ADDITIONAL TESTS: Reviewed.

## 2024-06-06 NOTE — PROGRESS NOTE ADULT - SUBJECTIVE AND OBJECTIVE BOX
Chief Complaint: Newly Diagnosed DM/DKA    History: Pt seen at bedside. pt tolerating oral diet. Pt reports an adequate appetite. Pt denies nausea and vomiting/any signs of hypoglycemia    MEDICATIONS  (STANDING):  chlorhexidine 2% Cloths 1 Application(s) Topical daily  dextrose 10% Bolus 125 milliLiter(s) IV Bolus once  dextrose 5%. 1000 milliLiter(s) (50 mL/Hr) IV Continuous <Continuous>  dextrose 5%. 1000 milliLiter(s) (100 mL/Hr) IV Continuous <Continuous>  dextrose 50% Injectable 25 Gram(s) IV Push once  dextrose 50% Injectable 12.5 Gram(s) IV Push once  glucagon  Injectable 1 milliGRAM(s) IntraMuscular once  heparin   Injectable 5000 Unit(s) SubCutaneous every 8 hours  insulin lispro (ADMELOG) corrective regimen sliding scale   SubCutaneous three times a day before meals  insulin lispro (ADMELOG) corrective regimen sliding scale   SubCutaneous at bedtime  insulin lispro Injectable (ADMELOG) 12 Unit(s) SubCutaneous three times a day before meals  insulin regular Infusion 7 Unit(s)/Hr (7 mL/Hr) IV Continuous <Continuous>  labetalol 100 milliGRAM(s) Oral two times a day    MEDICATIONS  (PRN):  dextrose Oral Gel 15 Gram(s) Oral once PRN Blood Glucose LESS THAN 70 milliGRAM(s)/deciliter      Allergies: No Known Allergies      Review of Systems:  Respiratory: No SOB, no cough  GI: No nausea, vomiting, abdominal pain  Endocrine: no polyuria, polydipsia    PHYSICAL EXAM:  VITALS: T(C): 37.7 (06-06-24 @ 04:00)  T(F): 99.8 (06-06-24 @ 04:00), Max: 99.8 (06-06-24 @ 04:00)  HR: 119 (06-06-24 @ 14:00) (110 - 129)  BP: 124/69 (06-06-24 @ 14:00) (124/69 - 186/98)  RR:  (23 - 39)  SpO2:  (94% - 100%)  Wt(kg): --  GENERAL: NAD, well-groomed, well-developed  RESPIRATORY: No labored breathing   GI: Soft, nontender, non distended  PSYCH: Alert and oriented x 3, normal affect, normal mood      CAPILLARY BLOOD GLUCOSE  POCT Blood Glucose.: 187 mg/dL (06 Jun 2024 14:59)  POCT Blood Glucose.: 182 mg/dL (06 Jun 2024 13:56)  POCT Blood Glucose.: 182 mg/dL (06 Jun 2024 12:56)  POCT Blood Glucose.: 203 mg/dL (06 Jun 2024 11:53)  POCT Blood Glucose.: 186 mg/dL (06 Jun 2024 11:30)  POCT Blood Glucose.: 563 mg/dL (06 Jun 2024 11:28)  POCT Blood Glucose.: 194 mg/dL (06 Jun 2024 10:14)  POCT Blood Glucose.: 213 mg/dL (06 Jun 2024 08:59)  POCT Blood Glucose.: 239 mg/dL (06 Jun 2024 07:52)  POCT Blood Glucose.: 258 mg/dL (06 Jun 2024 06:35)  POCT Blood Glucose.: 256 mg/dL (06 Jun 2024 05:44)  POCT Blood Glucose.: 255 mg/dL (06 Jun 2024 04:33)  POCT Blood Glucose.: 267 mg/dL (06 Jun 2024 03:40)  POCT Blood Glucose.: 311 mg/dL (06 Jun 2024 02:32)  POCT Blood Glucose.: 347 mg/dL (06 Jun 2024 01:36)  POCT Blood Glucose.: 345 mg/dL (06 Jun 2024 00:36)  POCT Blood Glucose.: 372 mg/dL (05 Jun 2024 23:29)  POCT Blood Glucose.: 416 mg/dL (05 Jun 2024 21:26)  POCT Blood Glucose.: 316 mg/dL (05 Jun 2024 18:25)  POCT Blood Glucose.: 274 mg/dL (05 Jun 2024 17:03)  POCT Blood Glucose.: 228 mg/dL (05 Jun 2024 16:09)    A1C with Estimated Average Glucose (06.04.24 @ 01:40)    A1C with Estimated Average Glucose Result: 16.2   Estimated Average Glucose: 418      06-06    145  |  105  |  11  ----------------------------<  200<H>  3.2<L>   |  27  |  1.00    eGFR: 73    Ca    8.6      06-06  Mg     2.20     06-06  Phos  3.1     06-06    TPro  7.1  /  Alb  3.2<L>  /  TBili  0.4  /  DBili  x   /  AST  27  /  ALT  16  /  AlkPhos  129<H>  06-06    Diet, Consistent Carbohydrate/No Snacks (06-06-24 @ 12:17) [Active]

## 2024-06-06 NOTE — PROGRESS NOTE ADULT - ASSESSMENT
This is a 38 y/o F with PMHx who was found down at home w/ nausea on face, admitted to MICU for DKA mgmt.     =======NEURO=========  #Encephalopathy resolved   - likely metabolic 2/2 DKA  - reassess when blood glucose and volume status improves  - CTH negative   - now back at baseline A&O x4     =======CARDIOVASCULAR=====  #Tachycardia  - sinus tach on EKG, likely in setting of DKA vs active infection vs dehydration   - trop neg x1    #HTN  - not on any home meds  - start labetolol 100mg BID  - pt will benefit from ACE/ARB when Cr improves     =======RESPIRATORY=========  #AHRF- resolved   - 2/2 to possible aspiration event?  - CXR limited view but clear lungs  - on RA    =======GASTROINTESTINAL====  - NPO until DKA resolves     =======GENITOURINARY=======  #OSMAN  - Admission Cr 4.57 -> 2.5, unclear baseline   - given fluid boluses in ED  - Possibly pre-renal ISO volume depletion and DKA + ATN   - ctm Cr  - avoid nephrotoxic agents  - renally dose meds  - strict I's and O's    #Hypernatremia - improved   - Na ~157 after correction of glucose  - 2/2 to dehydration, no PO intake  - 1.9 L free water deficit   - continue D5 LR mIVF for now     =======ID===================  #Sepsis   - WBC 16.9, T 100.4,    - U/A negative, limited CXR w/o focal consolidation, RVP negative, Blood cultures NGTD, UCx neg   - s/p vanc/zosyn in ED   - continue zosyn for now (6/3- ) x 5 days   - trend fever curve, WBC     =======HEME================  #Normocytic anemia  - no active bleed  - monitor CBC    =======ENDOCRINE============  #DKA- improving  #new diabetes  - per , no dx of DM    - A1c 16.2   - continue insulin drip per protocol  - FS q1h   - NPO   - BMP/VBG q4 to monitor anion gap/acidosis, replete K as needed     =======PROPHYLAXIS===========  -Access: PIV  -Code Status: Full  -Disposition: pending clinical course

## 2024-06-07 DIAGNOSIS — A41.9 SEPSIS, UNSPECIFIED ORGANISM: ICD-10-CM

## 2024-06-07 DIAGNOSIS — G93.41 METABOLIC ENCEPHALOPATHY: ICD-10-CM

## 2024-06-07 DIAGNOSIS — E11.9 TYPE 2 DIABETES MELLITUS WITHOUT COMPLICATIONS: ICD-10-CM

## 2024-06-07 DIAGNOSIS — N17.9 ACUTE KIDNEY FAILURE, UNSPECIFIED: ICD-10-CM

## 2024-06-07 DIAGNOSIS — J69.0 PNEUMONITIS DUE TO INHALATION OF FOOD AND VOMIT: ICD-10-CM

## 2024-06-07 LAB
ADD ON TEST-SPECIMEN IN LAB: SIGNIFICANT CHANGE UP
ALBUMIN SERPL ELPH-MCNC: 3.3 G/DL — SIGNIFICANT CHANGE UP (ref 3.3–5)
ALP SERPL-CCNC: 155 U/L — HIGH (ref 40–120)
ALT FLD-CCNC: 17 U/L — SIGNIFICANT CHANGE UP (ref 4–33)
ANION GAP SERPL CALC-SCNC: 14 MMOL/L — SIGNIFICANT CHANGE UP (ref 7–14)
ANION GAP SERPL CALC-SCNC: 16 MMOL/L — HIGH (ref 7–14)
AST SERPL-CCNC: 25 U/L — SIGNIFICANT CHANGE UP (ref 4–32)
B-OH-BUTYR SERPL-SCNC: 0.4 MMOL/L — SIGNIFICANT CHANGE UP (ref 0–0.4)
B-OH-BUTYR SERPL-SCNC: 2.1 MMOL/L — HIGH (ref 0–0.4)
BASE EXCESS BLDV CALC-SCNC: 2.4 MMOL/L — SIGNIFICANT CHANGE UP (ref -2–3)
BASOPHILS # BLD AUTO: 0.02 K/UL — SIGNIFICANT CHANGE UP (ref 0–0.2)
BASOPHILS NFR BLD AUTO: 0.1 % — SIGNIFICANT CHANGE UP (ref 0–2)
BILIRUB SERPL-MCNC: 0.4 MG/DL — SIGNIFICANT CHANGE UP (ref 0.2–1.2)
BLOOD GAS VENOUS COMPREHENSIVE RESULT: SIGNIFICANT CHANGE UP
BUN SERPL-MCNC: 17 MG/DL — SIGNIFICANT CHANGE UP (ref 7–23)
BUN SERPL-MCNC: 20 MG/DL — SIGNIFICANT CHANGE UP (ref 7–23)
CALCIUM SERPL-MCNC: 9 MG/DL — SIGNIFICANT CHANGE UP (ref 8.4–10.5)
CALCIUM SERPL-MCNC: 9.1 MG/DL — SIGNIFICANT CHANGE UP (ref 8.4–10.5)
CHLORIDE BLDV-SCNC: 103 MMOL/L — SIGNIFICANT CHANGE UP (ref 96–108)
CHLORIDE SERPL-SCNC: 100 MMOL/L — SIGNIFICANT CHANGE UP (ref 98–107)
CHLORIDE SERPL-SCNC: 105 MMOL/L — SIGNIFICANT CHANGE UP (ref 98–107)
CO2 BLDV-SCNC: 29.2 MMOL/L — HIGH (ref 22–26)
CO2 SERPL-SCNC: 25 MMOL/L — SIGNIFICANT CHANGE UP (ref 22–31)
CO2 SERPL-SCNC: 26 MMOL/L — SIGNIFICANT CHANGE UP (ref 22–31)
CREAT SERPL-MCNC: 1.22 MG/DL — SIGNIFICANT CHANGE UP (ref 0.5–1.3)
CREAT SERPL-MCNC: 1.24 MG/DL — SIGNIFICANT CHANGE UP (ref 0.5–1.3)
EGFR: 56 ML/MIN/1.73M2 — LOW
EGFR: 58 ML/MIN/1.73M2 — LOW
EOSINOPHIL # BLD AUTO: 0.12 K/UL — SIGNIFICANT CHANGE UP (ref 0–0.5)
EOSINOPHIL NFR BLD AUTO: 0.8 % — SIGNIFICANT CHANGE UP (ref 0–6)
GAD65 AB SER-MCNC: 0 NMOL/L — SIGNIFICANT CHANGE UP
GAS PNL BLDV: 140 MMOL/L — SIGNIFICANT CHANGE UP (ref 136–145)
GLUCOSE BLDC GLUCOMTR-MCNC: 109 MG/DL — HIGH (ref 70–99)
GLUCOSE BLDC GLUCOMTR-MCNC: 153 MG/DL — HIGH (ref 70–99)
GLUCOSE BLDC GLUCOMTR-MCNC: 180 MG/DL — HIGH (ref 70–99)
GLUCOSE BLDC GLUCOMTR-MCNC: 360 MG/DL — HIGH (ref 70–99)
GLUCOSE BLDC GLUCOMTR-MCNC: 371 MG/DL — HIGH (ref 70–99)
GLUCOSE BLDC GLUCOMTR-MCNC: 412 MG/DL — HIGH (ref 70–99)
GLUCOSE BLDC GLUCOMTR-MCNC: 422 MG/DL — HIGH (ref 70–99)
GLUCOSE BLDC GLUCOMTR-MCNC: 77 MG/DL — SIGNIFICANT CHANGE UP (ref 70–99)
GLUCOSE BLDV-MCNC: 377 MG/DL — HIGH (ref 70–99)
GLUCOSE SERPL-MCNC: 179 MG/DL — HIGH (ref 70–99)
GLUCOSE SERPL-MCNC: 347 MG/DL — HIGH (ref 70–99)
HCO3 BLDV-SCNC: 28 MMOL/L — SIGNIFICANT CHANGE UP (ref 22–29)
HCT VFR BLD CALC: 32.4 % — LOW (ref 34.5–45)
HCT VFR BLDA CALC: 28 % — LOW (ref 34.5–46.5)
HGB BLD CALC-MCNC: 10 G/DL — LOW (ref 11.7–16.1)
HGB BLD-MCNC: 9.7 G/DL — LOW (ref 11.5–15.5)
IANC: 12.26 K/UL — HIGH (ref 1.8–7.4)
IMM GRANULOCYTES NFR BLD AUTO: 0.8 % — SIGNIFICANT CHANGE UP (ref 0–0.9)
ISLET CELL512 AB SER-SCNC: 0 NMOL/L — SIGNIFICANT CHANGE UP
LACTATE BLDV-MCNC: 2.1 MMOL/L — HIGH (ref 0.5–2)
LACTATE SERPL-SCNC: 1.7 MMOL/L — SIGNIFICANT CHANGE UP (ref 0.5–2)
LYMPHOCYTES # BLD AUTO: 1.58 K/UL — SIGNIFICANT CHANGE UP (ref 1–3.3)
LYMPHOCYTES # BLD AUTO: 10.5 % — LOW (ref 13–44)
MAGNESIUM SERPL-MCNC: 2.1 MG/DL — SIGNIFICANT CHANGE UP (ref 1.6–2.6)
MAGNESIUM SERPL-MCNC: 2.2 MG/DL — SIGNIFICANT CHANGE UP (ref 1.6–2.6)
MCHC RBC-ENTMCNC: 23.1 PG — LOW (ref 27–34)
MCHC RBC-ENTMCNC: 29.9 GM/DL — LOW (ref 32–36)
MCV RBC AUTO: 77.1 FL — LOW (ref 80–100)
MONOCYTES # BLD AUTO: 0.9 K/UL — SIGNIFICANT CHANGE UP (ref 0–0.9)
MONOCYTES NFR BLD AUTO: 6 % — SIGNIFICANT CHANGE UP (ref 2–14)
NEUTROPHILS # BLD AUTO: 12.26 K/UL — HIGH (ref 1.8–7.4)
NEUTROPHILS NFR BLD AUTO: 81.8 % — HIGH (ref 43–77)
NRBC # BLD: 0 /100 WBCS — SIGNIFICANT CHANGE UP (ref 0–0)
NRBC # FLD: 0 K/UL — SIGNIFICANT CHANGE UP (ref 0–0)
PCO2 BLDV: 46 MMHG — SIGNIFICANT CHANGE UP (ref 39–52)
PH BLDV: 7.39 — SIGNIFICANT CHANGE UP (ref 7.32–7.43)
PHOSPHATE SERPL-MCNC: 2.8 MG/DL — SIGNIFICANT CHANGE UP (ref 2.5–4.5)
PHOSPHATE SERPL-MCNC: 3.5 MG/DL — SIGNIFICANT CHANGE UP (ref 2.5–4.5)
PLATELET # BLD AUTO: 208 K/UL — SIGNIFICANT CHANGE UP (ref 150–400)
PO2 BLDV: 44 MMHG — SIGNIFICANT CHANGE UP (ref 25–45)
POTASSIUM BLDV-SCNC: 3.6 MMOL/L — SIGNIFICANT CHANGE UP (ref 3.5–5.1)
POTASSIUM SERPL-MCNC: 3.3 MMOL/L — LOW (ref 3.5–5.3)
POTASSIUM SERPL-MCNC: 3.9 MMOL/L — SIGNIFICANT CHANGE UP (ref 3.5–5.3)
POTASSIUM SERPL-SCNC: 3.3 MMOL/L — LOW (ref 3.5–5.3)
POTASSIUM SERPL-SCNC: 3.9 MMOL/L — SIGNIFICANT CHANGE UP (ref 3.5–5.3)
PROT SERPL-MCNC: 7.6 G/DL — SIGNIFICANT CHANGE UP (ref 6–8.3)
RBC # BLD: 4.2 M/UL — SIGNIFICANT CHANGE UP (ref 3.8–5.2)
RBC # FLD: 17.4 % — HIGH (ref 10.3–14.5)
SAO2 % BLDV: 75.2 % — SIGNIFICANT CHANGE UP (ref 67–88)
SODIUM SERPL-SCNC: 141 MMOL/L — SIGNIFICANT CHANGE UP (ref 135–145)
SODIUM SERPL-SCNC: 145 MMOL/L — SIGNIFICANT CHANGE UP (ref 135–145)
WBC # BLD: 15 K/UL — HIGH (ref 3.8–10.5)
WBC # FLD AUTO: 15 K/UL — HIGH (ref 3.8–10.5)

## 2024-06-07 PROCEDURE — 99232 SBSQ HOSP IP/OBS MODERATE 35: CPT

## 2024-06-07 PROCEDURE — 99233 SBSQ HOSP IP/OBS HIGH 50: CPT

## 2024-06-07 RX ORDER — INSULIN LISPRO 100/ML
16 VIAL (ML) SUBCUTANEOUS
Refills: 0 | Status: DISCONTINUED | OUTPATIENT
Start: 2024-06-07 | End: 2024-06-08

## 2024-06-07 RX ORDER — SODIUM CHLORIDE 9 MG/ML
1000 INJECTION INTRAMUSCULAR; INTRAVENOUS; SUBCUTANEOUS ONCE
Refills: 0 | Status: COMPLETED | OUTPATIENT
Start: 2024-06-07 | End: 2024-06-07

## 2024-06-07 RX ORDER — INSULIN GLARGINE 100 [IU]/ML
50 INJECTION, SOLUTION SUBCUTANEOUS
Refills: 0 | Status: DISCONTINUED | OUTPATIENT
Start: 2024-06-08 | End: 2024-06-08

## 2024-06-07 RX ORDER — INSULIN GLARGINE 100 [IU]/ML
40 INJECTION, SOLUTION SUBCUTANEOUS
Refills: 0 | Status: DISCONTINUED | OUTPATIENT
Start: 2024-06-07 | End: 2024-06-07

## 2024-06-07 RX ORDER — INSULIN GLARGINE 100 [IU]/ML
50 INJECTION, SOLUTION SUBCUTANEOUS
Refills: 0 | Status: DISCONTINUED | OUTPATIENT
Start: 2024-06-07 | End: 2024-06-07

## 2024-06-07 RX ORDER — INSULIN GLARGINE 100 [IU]/ML
50 INJECTION, SOLUTION SUBCUTANEOUS
Qty: 1 | Refills: 0
Start: 2024-06-07 | End: 2024-07-06

## 2024-06-07 RX ORDER — POTASSIUM CHLORIDE 20 MEQ
10 PACKET (EA) ORAL
Refills: 0 | Status: COMPLETED | OUTPATIENT
Start: 2024-06-07 | End: 2024-06-07

## 2024-06-07 RX ORDER — ISOPROPYL ALCOHOL, BENZOCAINE .7; .06 ML/ML; ML/ML
0 SWAB TOPICAL
Qty: 100 | Refills: 1
Start: 2024-06-07

## 2024-06-07 RX ORDER — POTASSIUM CHLORIDE 20 MEQ
40 PACKET (EA) ORAL ONCE
Refills: 0 | Status: DISCONTINUED | OUTPATIENT
Start: 2024-06-07 | End: 2024-06-07

## 2024-06-07 RX ORDER — INSULIN LISPRO 100/ML
16 VIAL (ML) SUBCUTANEOUS
Qty: 1 | Refills: 0
Start: 2024-06-07 | End: 2024-07-06

## 2024-06-07 RX ORDER — LOSARTAN POTASSIUM 100 MG/1
25 TABLET, FILM COATED ORAL DAILY
Refills: 0 | Status: DISCONTINUED | OUTPATIENT
Start: 2024-06-08 | End: 2024-06-11

## 2024-06-07 RX ORDER — ATORVASTATIN CALCIUM 80 MG/1
10 TABLET, FILM COATED ORAL AT BEDTIME
Refills: 0 | Status: DISCONTINUED | OUTPATIENT
Start: 2024-06-07 | End: 2024-06-11

## 2024-06-07 RX ADMIN — Medication 16 UNIT(S): at 18:14

## 2024-06-07 RX ADMIN — Medication 100 MILLIEQUIVALENT(S): at 20:17

## 2024-06-07 RX ADMIN — SODIUM CHLORIDE 1000 MILLILITER(S): 9 INJECTION INTRAMUSCULAR; INTRAVENOUS; SUBCUTANEOUS at 16:32

## 2024-06-07 RX ADMIN — Medication 100 MILLIEQUIVALENT(S): at 18:25

## 2024-06-07 RX ADMIN — HEPARIN SODIUM 5000 UNIT(S): 5000 INJECTION INTRAVENOUS; SUBCUTANEOUS at 05:18

## 2024-06-07 RX ADMIN — ATORVASTATIN CALCIUM 10 MILLIGRAM(S): 80 TABLET, FILM COATED ORAL at 21:31

## 2024-06-07 RX ADMIN — INSULIN GLARGINE 50 UNIT(S): 100 INJECTION, SOLUTION SUBCUTANEOUS at 12:31

## 2024-06-07 RX ADMIN — Medication 100 MILLIGRAM(S): at 05:18

## 2024-06-07 RX ADMIN — CHLORHEXIDINE GLUCONATE 1 APPLICATION(S): 213 SOLUTION TOPICAL at 12:34

## 2024-06-07 RX ADMIN — Medication 10: at 12:30

## 2024-06-07 RX ADMIN — HEPARIN SODIUM 5000 UNIT(S): 5000 INJECTION INTRAVENOUS; SUBCUTANEOUS at 21:31

## 2024-06-07 RX ADMIN — Medication 2: at 18:13

## 2024-06-07 RX ADMIN — Medication 12 UNIT(S): at 08:52

## 2024-06-07 RX ADMIN — Medication 100 MILLIEQUIVALENT(S): at 19:21

## 2024-06-07 RX ADMIN — HEPARIN SODIUM 5000 UNIT(S): 5000 INJECTION INTRAVENOUS; SUBCUTANEOUS at 13:40

## 2024-06-07 RX ADMIN — Medication 16 UNIT(S): at 12:31

## 2024-06-07 RX ADMIN — Medication 12: at 08:51

## 2024-06-07 NOTE — PROGRESS NOTE ADULT - ASSESSMENT
39F with no known PMH who presented with altered mental status found to have metabolic encephalopathy 2/2 DKA c/b OSMAN. New diagnosis of DM.

## 2024-06-07 NOTE — PROGRESS NOTE ADULT - SUBJECTIVE AND OBJECTIVE BOX
Lino Velazquez MD  Division of Hospitalist Medicine  Pager [phone]  Available on Teams    CC: Patient is a 39y old  Female who presents with a chief complaint of DKA (06 Jun 2024 15:07)        SUBJECTIVE / INTERVAL HPI: Patient seen and examined at bedside. Pt grossly denies any new complaints.     ROS: negative unless otherwise stated above.    VITAL SIGNS:  Vital Signs Last 24 Hrs  T(C): 37.3 (07 Jun 2024 11:30), Max: 37.7 (06 Jun 2024 16:00)  T(F): 99.2 (07 Jun 2024 11:30), Max: 99.9 (06 Jun 2024 21:51)  HR: 123 (07 Jun 2024 11:30) (109 - 140)  BP: 106/62 (07 Jun 2024 11:30) (92/64 - 142/79)  BP(mean): 86 (06 Jun 2024 20:54) (62 - 94)  RR: 17 (07 Jun 2024 11:30) (17 - 44)  SpO2: 99% (07 Jun 2024 11:30) (94% - 100%)    Parameters below as of 07 Jun 2024 11:30  Patient On (Oxygen Delivery Method): room air          06-06-24 @ 07:01  -  06-07-24 @ 07:00  --------------------------------------------------------  IN: 2506 mL / OUT: 1845 mL / NET: 661 mL        PHYSICAL EXAM:    General: NAD  HEENT: MMM  Neck: supple  Cardiovascular: +S1/S2; RRR  Respiratory: CTA B/L; no W/R/R  Gastrointestinal: soft, NT/ND  Extremities: WWP; no edema, clubbing or cyanosis  Neurological: awake and alert; communicating and able to follow commands without appreciated focal neurologic deficits    MEDICATIONS:  MEDICATIONS  (STANDING):  chlorhexidine 2% Cloths 1 Application(s) Topical daily  dextrose 10% Bolus 125 milliLiter(s) IV Bolus once  dextrose 5%. 1000 milliLiter(s) (100 mL/Hr) IV Continuous <Continuous>  dextrose 5%. 1000 milliLiter(s) (50 mL/Hr) IV Continuous <Continuous>  dextrose 50% Injectable 12.5 Gram(s) IV Push once  dextrose 50% Injectable 25 Gram(s) IV Push once  glucagon  Injectable 1 milliGRAM(s) IntraMuscular once  heparin   Injectable 5000 Unit(s) SubCutaneous every 8 hours  insulin glargine Injectable (LANTUS) 50 Unit(s) SubCutaneous <User Schedule>  insulin lispro (ADMELOG) corrective regimen sliding scale   SubCutaneous three times a day before meals  insulin lispro (ADMELOG) corrective regimen sliding scale   SubCutaneous at bedtime  insulin lispro Injectable (ADMELOG) 16 Unit(s) SubCutaneous three times a day before meals  labetalol 100 milliGRAM(s) Oral two times a day    MEDICATIONS  (PRN):  dextrose Oral Gel 15 Gram(s) Oral once PRN Blood Glucose LESS THAN 70 milliGRAM(s)/deciliter      ALLERGIES:  Allergies    No Known Allergies    Intolerances        LABS:                        9.7    15.00 )-----------( 208      ( 07 Jun 2024 05:53 )             32.4     06-07    141  |  100  |  17  ----------------------------<  347<H>  3.9   |  25  |  1.22    Ca    9.0      07 Jun 2024 05:53  Phos  3.5     06-07  Mg     2.10     06-07    TPro  7.6  /  Alb  3.3  /  TBili  0.4  /  DBili  x   /  AST  25  /  ALT  17  /  AlkPhos  155<H>  06-07    PT/INR - ( 06 Jun 2024 02:00 )   PT: 12.9 sec;   INR: 1.16 ratio         PTT - ( 06 Jun 2024 02:00 )  PTT:27.1 sec  Urinalysis Basic - ( 07 Jun 2024 05:53 )    Color: x / Appearance: x / SG: x / pH: x  Gluc: 347 mg/dL / Ketone: x  / Bili: x / Urobili: x   Blood: x / Protein: x / Nitrite: x   Leuk Esterase: x / RBC: x / WBC x   Sq Epi: x / Non Sq Epi: x / Bacteria: x      CAPILLARY BLOOD GLUCOSE      POCT Blood Glucose.: 360 mg/dL (07 Jun 2024 11:23)      RADIOLOGY & ADDITIONAL TESTS: Reviewed.

## 2024-06-07 NOTE — DISCHARGE NOTE PROVIDER - ATTENDING DISCHARGE PHYSICAL EXAMINATION:
pt seen and examined by me  sitting in chair  offers no complaints  states she gave herself insulin this AM, feels ok about it  states mom will be home with her bc her  is a   VSS  CHEST non labored  EXT no cce  a/w unresponsiveness, DKA s/p ICU stay, new DM  DKA resolved  now on basal/bolus  DM teaching   medically stable for dc home  outpt f/u with endo

## 2024-06-07 NOTE — CHART NOTE - NSCHARTNOTEFT_GEN_A_CORE
Source: Patient A&Ox [4]    other [x] Chart Review     Medical Course: 39F with no known PMH who presented with altered mental status found to have metabolic encephalopathy 2/2 DKA c/b OSMAN. New diagnosis of DM.     Nutrition Course: Patient seen for Nutrition Services Assessment Education consult. Pt visited at bedside, reports her appetite has gotten better in hospital. Pt currently on Consistent Carbohydrate diet, tolerating well. Pt's labs notable with A1C 16.2, POCT 187-412H. Pt with insulin regimen ordered. Endocrine team is following. Pt is a newly dx DM2 pt. Provided pt with handout Carbohydrate Counting for People with Diabetes. Discussed carbohydrate sources, carbohydrate portions, protein sources, mixed meals, and nutrition label reading. Stressed importance of balanced meals with adequate protein and fiber. Pt was informed of current A1c, goal A1c, and goal fingerstick range. RD to remain available for further nutritional interventions as indicated.     Education:  [ x ] Given today       Diet, Consistent Carbohydrate/No Snacks (06-06-24 @ 12:17)      GI: WDL. Last BM 6/6 as per RN flow sheet     PO intake:   % [ x]      Anthropometrics: Height (cm): 154.9 (06-06)  Weight (kg): 81.8 (06-04)  BMI (kg/m2): 34.1 (06-06)    Edema: 1+ generalized edema and 1+ b/l LE as per RN flow sheet   Pressure Injuries: None reported at present.     __________________ Pertinent Medications__________________   MEDICATIONS  (STANDING):  atorvastatin 10 milliGRAM(s) Oral at bedtime  chlorhexidine 2% Cloths 1 Application(s) Topical daily  dextrose 10% Bolus 125 milliLiter(s) IV Bolus once  dextrose 5%. 1000 milliLiter(s) (50 mL/Hr) IV Continuous <Continuous>  dextrose 5%. 1000 milliLiter(s) (100 mL/Hr) IV Continuous <Continuous>  dextrose 50% Injectable 25 Gram(s) IV Push once  dextrose 50% Injectable 12.5 Gram(s) IV Push once  glucagon  Injectable 1 milliGRAM(s) IntraMuscular once  heparin   Injectable 5000 Unit(s) SubCutaneous every 8 hours  insulin glargine Injectable (LANTUS) 50 Unit(s) SubCutaneous <User Schedule>  insulin lispro (ADMELOG) corrective regimen sliding scale   SubCutaneous three times a day before meals  insulin lispro (ADMELOG) corrective regimen sliding scale   SubCutaneous at bedtime  insulin lispro Injectable (ADMELOG) 16 Unit(s) SubCutaneous three times a day before meals    MEDICATIONS  (PRN):  dextrose Oral Gel 15 Gram(s) Oral once PRN Blood Glucose LESS THAN 70 milliGRAM(s)/deciliter      __________________ Pertinent Labs__________________   06-07 Na141 mmol/L Glu 347 mg/dL<H> K+ 3.9 mmol/L Cr  1.22 mg/dL BUN 17 mg/dL 06-07 Phos 3.5 mg/dL 06-07 Alb 3.3 g/dL 06-04 Chol 152 mg/dL LDL --    HDL 34 mg/dL<L> Trig 284 mg/dL<H>        POCT Blood Glucose.: 371 mg/dL (06-07-24 @ 12:27)  POCT Blood Glucose.: 360 mg/dL (06-07-24 @ 11:23)  POCT Blood Glucose.: 412 mg/dL (06-07-24 @ 08:45)  POCT Blood Glucose.: 422 mg/dL (06-07-24 @ 08:44)  POCT Blood Glucose.: 294 mg/dL (06-06-24 @ 23:17)  POCT Blood Glucose.: 290 mg/dL (06-06-24 @ 22:01)  POCT Blood Glucose.: 187 mg/dL (06-06-24 @ 14:59)  POCT Blood Glucose.: 182 mg/dL (06-06-24 @ 13:56)  POCT Blood Glucose.: 182 mg/dL (06-06-24 @ 12:56)  POCT Blood Glucose.: 203 mg/dL (06-06-24 @ 11:53)  POCT Blood Glucose.: 186 mg/dL (06-06-24 @ 11:30)  POCT Blood Glucose.: 563 mg/dL (06-06-24 @ 11:28)  POCT Blood Glucose.: 194 mg/dL (06-06-24 @ 10:14)  POCT Blood Glucose.: 213 mg/dL (06-06-24 @ 08:59)  POCT Blood Glucose.: 239 mg/dL (06-06-24 @ 07:52)  POCT Blood Glucose.: 258 mg/dL (06-06-24 @ 06:35)  POCT Blood Glucose.: 256 mg/dL (06-06-24 @ 05:44)  POCT Blood Glucose.: 255 mg/dL (06-06-24 @ 04:33)  POCT Blood Glucose.: 267 mg/dL (06-06-24 @ 03:40)  POCT Blood Glucose.: 311 mg/dL (06-06-24 @ 02:32)  POCT Blood Glucose.: 347 mg/dL (06-06-24 @ 01:36)  POCT Blood Glucose.: 345 mg/dL (06-06-24 @ 00:36)  POCT Blood Glucose.: 372 mg/dL (06-05-24 @ 23:29)  POCT Blood Glucose.: 416 mg/dL (06-05-24 @ 21:26)  POCT Blood Glucose.: 316 mg/dL (06-05-24 @ 18:25)  POCT Blood Glucose.: 274 mg/dL (06-05-24 @ 17:03)  POCT Blood Glucose.: 228 mg/dL (06-05-24 @ 16:09)  POCT Blood Glucose.: 238 mg/dL (06-05-24 @ 15:07)  POCT Blood Glucose.: 254 mg/dL (06-05-24 @ 14:15)  POCT Blood Glucose.: 234 mg/dL (06-05-24 @ 13:18)  POCT Blood Glucose.: 251 mg/dL (06-05-24 @ 12:13)  POCT Blood Glucose.: 241 mg/dL (06-05-24 @ 11:22)  POCT Blood Glucose.: 193 mg/dL (06-05-24 @ 10:11)  POCT Blood Glucose.: 183 mg/dL (06-05-24 @ 09:06)  POCT Blood Glucose.: 173 mg/dL (06-05-24 @ 08:04)  POCT Blood Glucose.: 138 mg/dL (06-05-24 @ 06:57)  POCT Blood Glucose.: 153 mg/dL (06-05-24 @ 06:03)  POCT Blood Glucose.: 161 mg/dL (06-05-24 @ 05:02)  POCT Blood Glucose.: 196 mg/dL (06-05-24 @ 03:58)  POCT Blood Glucose.: 230 mg/dL (06-05-24 @ 02:56)  POCT Blood Glucose.: 238 mg/dL (06-05-24 @ 01:57)  POCT Blood Glucose.: 233 mg/dL (06-05-24 @ 01:05)  POCT Blood Glucose.: 263 mg/dL (06-05-24 @ 00:09)  POCT Blood Glucose.: 279 mg/dL (06-04-24 @ 23:05)  POCT Blood Glucose.: 281 mg/dL (06-04-24 @ 22:02)  POCT Blood Glucose.: 344 mg/dL (06-04-24 @ 21:01)  POCT Blood Glucose.: 352 mg/dL (06-04-24 @ 20:18)  POCT Blood Glucose.: 285 mg/dL (06-04-24 @ 18:58)  POCT Blood Glucose.: 264 mg/dL (06-04-24 @ 17:59)  POCT Blood Glucose.: 271 mg/dL (06-04-24 @ 16:59)  POCT Blood Glucose.: 314 mg/dL (06-04-24 @ 16:01)          Estimated Needs:   [x ] no change since previous assessment        Previous Nutrition Diagnosis: Altered Nutrition Related Lab Values, Food & Nutrition Related Knowledge Deficit    Nutrition Diagnosis is [x ] ongoing       Recommendations:  1) Recommend continue with current diet, which remains appropriate at this time.   2) Monitor PO intake, Labs, weights, BMs, and skin integrity.   3) RD to remain available for further nutritional interventions as indicated.       Monitoring and Evaluation:      [ x] Tolerance to diet prescription [x ] weights [x ] follow up per protocol  [ ] other:

## 2024-06-07 NOTE — DISCHARGE NOTE PROVIDER - NSDCCPCAREPLAN_GEN_ALL_CORE_FT
PRINCIPAL DISCHARGE DIAGNOSIS  Diagnosis: Diabetic ketoacidosis  Assessment and Plan of Treatment: Diabetic ketoacidosis is a serious problem that happens to people with diabetes when chemicals called "ketones" build up in their blood. Normally, the body breaks down sugar as a source of energy. But in people with diabetes who do not make enough insulin, the body is unable to use sugar. When the body can't use sugar, it burns fat as a source of energy. But burning fat can cause the body to make too many ketones. When ketones build up in the blood, they can be toxic. People can get diabetic ketoacidosis for a few reasons; they have a major illness or health problem, such a heart attack or infection, they take certain medicines or illegal drugs or they don't take their insulin as directed.  The symptoms can include; feeling very thirsty and drinking a lot, urinating a lot, nausea or vomiting, belly pain, feeling tired or having trouble thinking clearly, having breath that smells sweet or fruity and unintentional weight loss. In order to prevent DKA in the future it is very important that you take your diabetes medications as directed, measure your blood sugar regularly, and continue to see primary care doctor or endocrinologist for management of your diabetes.     PRINCIPAL DISCHARGE DIAGNOSIS  Diagnosis: Diabetic ketoacidosis  Assessment and Plan of Treatment: Diabetic ketoacidosis is a serious problem that happens to people with diabetes when chemicals called "ketones" build up in their blood. Normally, the body breaks down sugar as a source of energy. But in people with diabetes who do not make enough insulin, the body is unable to use sugar. When the body can't use sugar, it burns fat as a source of energy. But burning fat can cause the body to make too many ketones. When ketones build up in the blood, they can be toxic. People can get diabetic ketoacidosis for a few reasons; they have a major illness or health problem, such a heart attack or infection, they take certain medicines or illegal drugs or they don't take their insulin as directed.  The symptoms can include; feeling very thirsty and drinking a lot, urinating a lot, nausea or vomiting, belly pain, feeling tired or having trouble thinking clearly, having breath that smells sweet or fruity and unintentional weight loss. In order to prevent DKA in the future it is very important that you take your diabetes medications as directed, measure your blood sugar regularly, and continue to see primary care doctor or endocrinologist for management of your diabetes.      SECONDARY DISCHARGE DIAGNOSES  Diagnosis: Type 2 diabetes mellitus  Assessment and Plan of Treatment: Continue consistent carbohydrate diet.  Monitor blood glucose levels throughout the day before meals and at bedtime.  Record blood sugars and bring to outpatient providers appointment in order to be reviewed by your doctor for management modifications.  Be aware of diabetes management symptoms including feeling cool and clammy may be related to low glucose levels.  Feeling hot and dry may indicate high glucose levels.  If  you feel these symptoms, check your blood sugar.  Make regular podiatry appointments in order to have feet checked for wounds and toe nails cut by a doctor to prevent infections.      Diagnosis: HTN (hypertension)  Assessment and Plan of Treatment: Continue blood pressure medication regimen as directed. Monitor for any visual changes, headaches or dizziness.  Monitor blood pressure regularly.  Follow up with your PCP for further management for high blood pressure.

## 2024-06-07 NOTE — PROGRESS NOTE ADULT - ASSESSMENT
40 y/o F w/ PMHx of HTN who presented after found down at home unresponsive in bed with emesis on face, admitted to MICU for DKA. a1c 16.2%, started on insulin gtt. 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%)  Home meds: none  - Glucose 140-180mg/dl: above goal: pt denies eating in between meals  - S/p insulin gtt  - Increase Lantus to 50 units sq 12pm will move 2 hours daily unitl reaches bedtime (please do not hold as pt is at risk for DKA)  - Increase Admelog to 16 units TID AC (please hold if npo/not eating)  - Continue Admelog moderate correction scale tid ac and seperate Admelog moderate correction scale at bedtime   - FS before meals and at bedtime   - Consistent carb diet   - RD consult  - Hypoglycemia Protocol   - given patient with acute DKA - should check Latent autoimmune diabetes of adults (KELLY) antibodies: zinc transporter antibody, glutamic acid decarboxylase antibody, IA-2 antibody (specimen received awaiting results); will check c-peptide when glucose controlled and closer to dc as c-peptide can be falsely low with recent DKA   - Nutrition consult and diabetes/insulin pen teaching prior to discharge given this is NEW diabetes diagnosis, including glucometer teaching.  - Transition of care pharmacist following: please refer to pharmacotherapy note    - DISCHARGE RECOMMENDATIONS: basal-bolus regimen, doses TBD.     -Pt will need prescription for basal insulin pen (ie. Lantus Solostar pen, if not covered then ask pharmacy which brand of basal insulin is covered by their insurance plan - other brands include: Basaglar, Lantus, Tresiba, Toujeo) and bolus insulin pen (ie. Humalog Kwikpen , if not covered then ask pharmacy which brand of bolus insulin is covered by their insurance plan - other brands include: Novolog, Admelog). Dispense 5 insulin pens with 3 refills.     -Please send prescriptions for diabetes supplies (glucometer, test strips, lancets, alcohol swabs, insulin pen needles) - dispense #100, use as directed (3 refills).    - Please check for insurance coverage for [employer] 3 sensors     - Please prescribe Glucose tablets 4G (take 4 tablets) or 15G tablets for blood sugar less than 70 mG/dL repeat fingerstick in 15 minutes. Patient and family will need instructions on proper use and to call 911 after use.     -Pt has [employer] medicaid please follow up on  July 10th at 2PM with Dr. Mensah at [facility] [address] [place], [place] [id number] [phone] for Endocrinology appointment    #HLD  - LDL 61  - goal LDL in T2DM is <70    #HTN  - goal BP in T2DM is <130/80  - please obtain urine micro albumin cr ratio as an outpt   - defer to primary team    d/w Coby Renee  Nurse Practitioner  Division of Endocrinology & Diabetes  In house pager #[phone]    If before 9AM or after 6PM, or on weekends/holidays, please call endocrine answering service for assistance ([phone]).For nonurgent matters email [email or web address] for assistance.    40 y/o F w/ PMHx of HTN who presented after found down at home unresponsive in bed with emesis on face, admitted to MICU for DKA. a1c 16.2%, started on insulin gtt. 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%)  Home meds: none  - Glucose 140-180mg/dl: above goal: pt denies eating in between meals  - S/p insulin gtt  - Increase Lantus to 50 units sq 12pm will move 2 hours daily until reaches bedtime (please do not hold as pt is at risk for DKA)  - Increase Admelog to 16 units TID AC (please hold if npo/not eating)  - Continue Admelog moderate correction scale tid ac and seperate Admelog moderate correction scale at bedtime   - FS before meals and at bedtime   - Consistent carb diet   - RD consult  - Hypoglycemia Protocol   - given patient with acute DKA - should check Latent autoimmune diabetes of adults (KELLY) antibodies: zinc transporter antibody, glutamic acid decarboxylase antibody, IA-2 antibody (specimen received awaiting results); will check c-peptide when glucose controlled and closer to dc as c-peptide can be falsely low with recent DKA   - Nutrition consult and diabetes/insulin pen teaching prior to discharge given this is NEW diabetes diagnosis, including glucometer teaching.  - Transition of care pharmacist following: please refer to pharmacotherapy note  - BHB 2.1 added on to this am labs, Hco3 25, anion gap 16; doesn't meet criteria for DKA ; recommended IVF bolus if no contraindications and repeat labs BHB, PH, and BMP after fluid bolus if any concern fr DKA please contact Endocrine     - DISCHARGE RECOMMENDATIONS: basal-bolus regimen, doses TBD.     -Pt will need prescription for basal insulin pen (ie. Lantus Solostar pen, if not covered then ask pharmacy which brand of basal insulin is covered by their insurance plan - other brands include: Basaglar, Lantus, Tresiba, Toujeo) and bolus insulin pen (ie. Humalog Kwikpen , if not covered then ask pharmacy which brand of bolus insulin is covered by their insurance plan - other brands include: Novolog, Admelog). Dispense 5 insulin pens with 3 refills.     -Please send prescriptions for diabetes supplies (glucometer, test strips, lancets, alcohol swabs, insulin pen needles) - dispense #100, use as directed (3 refills).    - Please check for insurance coverage for [employer] 3 sensors     - Please prescribe Glucose tablets 4G (take 4 tablets) or 15G tablets for blood sugar less than 70 mG/dL repeat fingerstick in 15 minutes. Patient and family will need instructions on proper use and to call 911 after use.     -[facility] please follow up on  July 10th at 2PM with Dr. Mensah at [facility] [address] [place], [place] [id number] [phone] for Endocrinology appointment    #HLD  - LDL 61  - goal LDL in T2DM is <70    #HTN  - goal BP in T2DM is <130/80  - please obtain urine micro albumin cr ratio as an outpt   - defer to primary team    d/w Coby Renee  Nurse Practitioner  Division of Endocrinology & Diabetes  In house pager #[phone]    If before 9AM or after 6PM, or on weekends/holidays, please call endocrine answering service for assistance ([phone]).For nonurgent matters email [email or web address] for assistance.    38 y/o F w/ PMHx of HTN who presented after found down at home unresponsive in bed with emesis on face, admitted to MICU for DKA. a1c 16.2%, started on insulin gtt. 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%)  Home meds: none  - Glucose 140-180mg/dl: above goal: pt denies eating in between meals  - S/p insulin gtt  - Increase Lantus to 50 units sq 12pm will move 2 hours daily until reaches bedtime (please do not hold as pt is at risk for DKA)  - Increase Admelog to 16 units TID AC (please hold if npo/not eating)  - Continue Admelog moderate correction scale tid ac and seperate Admelog moderate correction scale at bedtime   - FS before meals and at bedtime   - Consistent carb diet   - RD consult  - Hypoglycemia Protocol   - given patient with acute DKA - should check Latent autoimmune diabetes of adults (KELLY) antibodies: zinc transporter antibody, glutamic acid decarboxylase antibody, IA-2 antibody (specimen received awaiting results); will check c-peptide when glucose controlled and closer to dc as c-peptide can be falsely low with recent DKA   - Nutrition consult and diabetes/insulin pen teaching prior to discharge given this is NEW diabetes diagnosis, including glucometer teaching.  - Transition of care pharmacist following: please refer to pharmacotherapy note  - BHB 2.1 added on to this am labs, Hco3 25, anion gap 16; doesn't meet criteria for DKA ; recommended IVF bolus if no contraindications and repeat labs BHB, PH, and BMP after fluid bolus if any concern fr DKA please contact Endocrine     - DISCHARGE RECOMMENDATIONS: basal-bolus regimen, doses TBD.     -Pt will need prescription for basal insulin pen (ie. Lantus Solostar pen, if not covered then ask pharmacy which brand of basal insulin is covered by their insurance plan - other brands include: Basaglar, Lantus, Tresiba, Toujeo) and bolus insulin pen (ie. Humalog Kwikpen , if not covered then ask pharmacy which brand of bolus insulin is covered by their insurance plan - other brands include: Novolog, Admelog). Dispense 5 insulin pens with 3 refills.     -Please send prescriptions for diabetes supplies (glucometer, test strips, lancets, alcohol swabs, insulin pen needles) - dispense #100, use as directed (3 refills).    - Please check for insurance coverage for [employer] 3 sensors     - Please prescribe Glucose tablets 4G (take 4 tablets) or 15G tablets for blood sugar less than 70 mG/dL repeat fingerstick in 15 minutes. Patient and family will need instructions on proper use and to call 911 after use.     -[facility] please follow up on  July 10th at 2PM with Dr. Mensah at [facility] [address] [place], [place] [address] [phone] for Endocrinology appointment; needs to bring referral and A1c    #HLD  - LDL 61  - goal LDL in T2DM is <70    #HTN  - goal BP in T2DM is <130/80  - please obtain urine micro albumin cr ratio as an outpt   - defer to primary team    d/w Coby Renee  Nurse Practitioner  Division of Endocrinology & Diabetes  In house pager #[phone]    If before 9AM or after 6PM, or on weekends/holidays, please call endocrine answering service for assistance ([phone]).For nonurgent matters email [email or web address] for assistance.

## 2024-06-07 NOTE — PROGRESS NOTE ADULT - SUBJECTIVE AND OBJECTIVE BOX
Chief Complaint: Ketosis prone T2DM vs KELLY(A1c 16.2%)    History: Pt seen at bedside. Pt tolerating oral diet. Pt denies nausea and vomiting/any signs of hypoglycemia. Pt reports an adequate appetite.     MEDICATIONS  (STANDING):  atorvastatin 10 milliGRAM(s) Oral at bedtime  chlorhexidine 2% Cloths 1 Application(s) Topical daily  dextrose 10% Bolus 125 milliLiter(s) IV Bolus once  dextrose 5%. 1000 milliLiter(s) (50 mL/Hr) IV Continuous <Continuous>  dextrose 5%. 1000 milliLiter(s) (100 mL/Hr) IV Continuous <Continuous>  dextrose 50% Injectable 25 Gram(s) IV Push once  dextrose 50% Injectable 12.5 Gram(s) IV Push once  glucagon  Injectable 1 milliGRAM(s) IntraMuscular once  heparin   Injectable 5000 Unit(s) SubCutaneous every 8 hours  insulin glargine Injectable (LANTUS) 50 Unit(s) SubCutaneous <User Schedule>  insulin lispro (ADMELOG) corrective regimen sliding scale   SubCutaneous three times a day before meals  insulin lispro (ADMELOG) corrective regimen sliding scale   SubCutaneous at bedtime  insulin lispro Injectable (ADMELOG) 16 Unit(s) SubCutaneous three times a day before meals    MEDICATIONS  (PRN):  dextrose Oral Gel 15 Gram(s) Oral once PRN Blood Glucose LESS THAN 70 milliGRAM(s)/deciliter      Allergies: No Known Allergies    Review of Systems:  Respiratory: No SOB, no cough  GI: No nausea, vomiting, abdominal pain  Endocrine: no polyuria, polydipsia      PHYSICAL EXAM:  VITALS: T(C): 37.3 (06-07-24 @ 11:30)  T(F): 99.2 (06-07-24 @ 11:30), Max: 99.9 (06-06-24 @ 21:51)  HR: 123 (06-07-24 @ 11:30) (109 - 140)  BP: 106/62 (06-07-24 @ 11:30) (92/64 - 139/77)  RR:  (17 - 44)  SpO2:  (94% - 99%)  Wt(kg): --  GENERAL: NAD, well-groomed, well-developed  RESPIRATORY: No labored breathing   GI: Soft, nontender, non distended  PSYCH: Alert and oriented x 3, normal affect, normal mood      CAPILLARY BLOOD GLUCOSE  POCT Blood Glucose.: 371 mg/dL (07 Jun 2024 12:27)  POCT Blood Glucose.: 360 mg/dL (07 Jun 2024 11:23)  POCT Blood Glucose.: 412 mg/dL (07 Jun 2024 08:45)  POCT Blood Glucose.: 422 mg/dL (07 Jun 2024 08:44)  POCT Blood Glucose.: 294 mg/dL (06 Jun 2024 23:17)  POCT Blood Glucose.: 290 mg/dL (06 Jun 2024 22:01)  POCT Blood Glucose.: 187 mg/dL (06 Jun 2024 14:59)  POCT Blood Glucose.: 182 mg/dL (06 Jun 2024 13:56)    A1C with Estimated Average Glucose (06.04.24 @ 01:40)    A1C with Estimated Average Glucose Result: 16.2   Estimated Average Glucose: 418      06-07    141  |  100  |  17  ----------------------------<  347<H>  3.9   |  25  |  1.22    eGFR: 58<L>    Ca    9.0      06-07  Mg     2.10     06-07  Phos  3.5     06-07    TPro  7.6  /  Alb  3.3  /  TBili  0.4  /  DBili  x   /  AST  25  /  ALT  17  /  AlkPhos  155<H>  06-07      Diet, Consistent Carbohydrate/No Snacks (06-06-24 @ 12:17) [Active]

## 2024-06-07 NOTE — DISCHARGE NOTE PROVIDER - NSDCFUADDAPPT_GEN_ALL_CORE_FT
-follow up on  July 10th at 2PM with Dr. Mensah at [facility] [address] [place], [place] [id number] [phone] for Endocrinology appointment;   - needs to bring referral and A1c

## 2024-06-07 NOTE — CHART NOTE - NSCHARTNOTESELECT_GEN_ALL_CORE
ACP/Event Note
Consult- Nutrition Services Assessment Education/Nutrition Services
Event Note
MICU transfer/Event Note
POCUS/Event Note

## 2024-06-07 NOTE — DISCHARGE NOTE PROVIDER - HOSPITAL COURSE
HPI:  This is a 40 y/o F with PMHx of DM who presented from EMS after found unresponsive w/ emesis on face. Patient unable to provide any history due to AMS. No family at bedside or contact info in chart to provide further collateral.     Per ED note, pt was given 1L of fluids, started on NRB. Patient barely responsive to sternal rub. Deferred intubation as patient satting 100%.     In the ED, T 100.4, , /60, RR 10, 99% on NRB -> weaned to 6L NC. Labs significant for WBC 16.9, AG 40, Cr 4.57, glucose >1500, BHB >9, pH 7.11, U/A w/ 40 ketones. s/p vanc 1g x1, zosyn x1, 3L NS bolus, calcium gluconate 2g x1. MICU consulted for DKA mgmt.   (03 Jun 2024 19:09)    Hospital Course:  Pt admitted with metabolic encephalopathy and found to be in DKA with new diagnosis of DM. Initially admitted to MICU with insulin gtt. Transitioned to basal bolus regimen. Endocrine consulted to assist in insulin titration.    Important Medication Changes and Reason:    Active or Pending Issues Requiring Follow-up:    Advanced Directives:   [ ] Full code  [ ] DNR  [ ] Hospice    Discharge Diagnoses:    Follow up Visit Information:   HOME Telehealth Instructions: At the time of your appointment, you will receive a text/email invite for your telehealth session. Please click on the link, enter the patient's name. You will then be redirected to a virtual waiting room, where you will wait until the doctor has connected with you.     HPI:  This is a 40 y/o F with PMHx of DM who presented from EMS after found unresponsive w/ emesis on face. Patient unable to provide any history due to AMS. No family at bedside or contact info in chart to provide further collateral.     Per ED note, pt was given 1L of fluids, started on NRB. Patient barely responsive to sternal rub. Deferred intubation as patient satting 100%.     In the ED, T 100.4, , /60, RR 10, 99% on NRB -> weaned to 6L NC. Labs significant for WBC 16.9, AG 40, Cr 4.57, glucose >1500, BHB >9, pH 7.11, U/A w/ 40 ketones. s/p vanc 1g x1, zosyn x1, 3L NS bolus, calcium gluconate 2g x1. MICU consulted for DKA mgmt.   (03 Jun 2024 19:09)    Hospital Course:  Pt admitted with metabolic encephalopathy and found to be in DKA with new diagnosis of DM. Initially admitted to MICU with insulin gtt. Transitioned to basal bolus regimen. Endocrine consulted to assist in insulin titration.  Patient now cleared for discharge home with home care on basal bolus insulin regimen.

## 2024-06-07 NOTE — DISCHARGE NOTE PROVIDER - NSDCMRMEDTOKEN_GEN_ALL_CORE_FT
alcohol swabs: Apply topically to affected area 4 times a day  Freestyle Juany 3 Sensors: Please change every 14 days  glucometer (per patient&#x27;s insurance): Test blood sugars four times a day. Dispense #1 glucometer.  glucose tablets: Follow instructions on bottle when sugar is low.  HumaLOG KwikPen 100 units/mL injectable solution: 16 unit(s) injectable 3 times a day (before meals) unit(s) subcutaneous 3 times a day (with meals)  Insulin Pen Needles, 4mm: 1 application subcutaneously 4 times a day. ** Use with insulin pen **  lancets: 1 application subcutaneously 4 times a day  Lantus Solostar Pen 100 units/mL subcutaneous solution: 50 unit(s) subcutaneous once a day unit(s) subcutaneous once a day at 12pm  test strips (per patient&#x27;s insurance): 1 application subcutaneously 4 times a day. ** Compatible with patient&#x27;s glucometer **  Urine ketone strips: Use strip mid-urine stream as needed to assess for ketones. Call medical provider or 911 if moderate to high ketones are present.   alcohol swabs: Apply topically to affected area 4 times a day  atorvastatin 10 mg oral tablet: 1 tab(s) orally once a day (at bedtime)  glucometer (per patient&#x27;s insurance): Test blood sugars four times a day. Dispense #1 glucometer.  glucose tablets: Follow instructions on bottle when sugar is low.  HumaLOG KwikPen 100 units/mL injectable solution: 16 unit(s) injectable 3 times a day (before meals) unit(s) subcutaneous 3 times a day (with meals)  Insulin Pen Needles, 4mm: 1 application subcutaneously 4 times a day. ** Use with insulin pen **  lancets: 1 application subcutaneously 4 times a day  Lantus Solostar Pen 100 units/mL subcutaneous solution: 50 unit(s) subcutaneous once a day unit(s) subcutaneous once a day at 12pm  losartan 25 mg oral tablet: 1 tab(s) orally once a day  test strips (per patient&#x27;s insurance): 1 application subcutaneously 4 times a day. ** Compatible with patient&#x27;s glucometer **  Urine ketone strips: Use strip mid-urine stream as needed to assess for ketones. Call medical provider or 911 if moderate to high ketones are present.   atorvastatin 10 mg oral tablet: 1 tab(s) orally once a day (at bedtime)  HumaLOG KwikPen 100 units/mL injectable solution: 13 unit(s) injectable 3 times a day (before meals) unit(s) subcutaneous 3 times a day (with meals)  insulin glargine 100 units/mL subcutaneous solution: 52 unit(s) subcutaneous once a day at 8 pm  losartan 25 mg oral tablet: 1 tab(s) orally once a day   atorvastatin 10 mg oral tablet: 1 tab(s) orally once a day (at bedtime)  losartan 25 mg oral tablet: 1 tab(s) orally once a day

## 2024-06-07 NOTE — CHART NOTE - NSCHARTNOTEFT_GEN_A_CORE
Labs today notable for BHB of 2.1 and lactate 2.1.    Pt given IV NS bolus.   F/u rpt BMP, lactate, BHB.   Discussed with abigail and Dr. Velazquez.

## 2024-06-08 LAB
ANION GAP SERPL CALC-SCNC: 15 MMOL/L — HIGH (ref 7–14)
BUN SERPL-MCNC: 16 MG/DL — SIGNIFICANT CHANGE UP (ref 7–23)
CALCIUM SERPL-MCNC: 8.8 MG/DL — SIGNIFICANT CHANGE UP (ref 8.4–10.5)
CHLORIDE SERPL-SCNC: 102 MMOL/L — SIGNIFICANT CHANGE UP (ref 98–107)
CO2 SERPL-SCNC: 25 MMOL/L — SIGNIFICANT CHANGE UP (ref 22–31)
CREAT SERPL-MCNC: 1.18 MG/DL — SIGNIFICANT CHANGE UP (ref 0.5–1.3)
CULTURE RESULTS: SIGNIFICANT CHANGE UP
CULTURE RESULTS: SIGNIFICANT CHANGE UP
EGFR: 60 ML/MIN/1.73M2 — SIGNIFICANT CHANGE UP
GLUCOSE BLDC GLUCOMTR-MCNC: 104 MG/DL — HIGH (ref 70–99)
GLUCOSE BLDC GLUCOMTR-MCNC: 140 MG/DL — HIGH (ref 70–99)
GLUCOSE BLDC GLUCOMTR-MCNC: 290 MG/DL — HIGH (ref 70–99)
GLUCOSE BLDC GLUCOMTR-MCNC: 68 MG/DL — LOW (ref 70–99)
GLUCOSE BLDC GLUCOMTR-MCNC: 71 MG/DL — SIGNIFICANT CHANGE UP (ref 70–99)
GLUCOSE BLDC GLUCOMTR-MCNC: 77 MG/DL — SIGNIFICANT CHANGE UP (ref 70–99)
GLUCOSE SERPL-MCNC: 261 MG/DL — HIGH (ref 70–99)
HCT VFR BLD CALC: 28.4 % — LOW (ref 34.5–45)
HGB BLD-MCNC: 8.7 G/DL — LOW (ref 11.5–15.5)
MAGNESIUM SERPL-MCNC: 1.9 MG/DL — SIGNIFICANT CHANGE UP (ref 1.6–2.6)
MCHC RBC-ENTMCNC: 23.5 PG — LOW (ref 27–34)
MCHC RBC-ENTMCNC: 30.6 GM/DL — LOW (ref 32–36)
MCV RBC AUTO: 76.5 FL — LOW (ref 80–100)
NRBC # BLD: 0 /100 WBCS — SIGNIFICANT CHANGE UP (ref 0–0)
NRBC # FLD: 0 K/UL — SIGNIFICANT CHANGE UP (ref 0–0)
PHOSPHATE SERPL-MCNC: 3 MG/DL — SIGNIFICANT CHANGE UP (ref 2.5–4.5)
PLATELET # BLD AUTO: 212 K/UL — SIGNIFICANT CHANGE UP (ref 150–400)
POTASSIUM SERPL-MCNC: 3.3 MMOL/L — LOW (ref 3.5–5.3)
POTASSIUM SERPL-SCNC: 3.3 MMOL/L — LOW (ref 3.5–5.3)
RBC # BLD: 3.71 M/UL — LOW (ref 3.8–5.2)
RBC # FLD: 17.2 % — HIGH (ref 10.3–14.5)
SODIUM SERPL-SCNC: 142 MMOL/L — SIGNIFICANT CHANGE UP (ref 135–145)
SPECIMEN SOURCE: SIGNIFICANT CHANGE UP
SPECIMEN SOURCE: SIGNIFICANT CHANGE UP
WBC # BLD: 15.03 K/UL — HIGH (ref 3.8–10.5)
WBC # FLD AUTO: 15.03 K/UL — HIGH (ref 3.8–10.5)

## 2024-06-08 PROCEDURE — 99232 SBSQ HOSP IP/OBS MODERATE 35: CPT

## 2024-06-08 RX ORDER — INSULIN GLARGINE 100 [IU]/ML
58 INJECTION, SOLUTION SUBCUTANEOUS
Refills: 0 | Status: DISCONTINUED | OUTPATIENT
Start: 2024-06-09 | End: 2024-06-09

## 2024-06-08 RX ORDER — INSULIN LISPRO 100/ML
13 VIAL (ML) SUBCUTANEOUS
Refills: 0 | Status: DISCONTINUED | OUTPATIENT
Start: 2024-06-08 | End: 2024-06-09

## 2024-06-08 RX ORDER — INSULIN GLARGINE 100 [IU]/ML
58 INJECTION, SOLUTION SUBCUTANEOUS
Refills: 0 | Status: DISCONTINUED | OUTPATIENT
Start: 2024-06-08 | End: 2024-06-08

## 2024-06-08 RX ORDER — POTASSIUM CHLORIDE 20 MEQ
40 PACKET (EA) ORAL EVERY 4 HOURS
Refills: 0 | Status: COMPLETED | OUTPATIENT
Start: 2024-06-08 | End: 2024-06-08

## 2024-06-08 RX ORDER — ACETAMINOPHEN 500 MG
650 TABLET ORAL EVERY 6 HOURS
Refills: 0 | Status: DISCONTINUED | OUTPATIENT
Start: 2024-06-08 | End: 2024-06-11

## 2024-06-08 RX ORDER — INSULIN GLARGINE 100 [IU]/ML
55 INJECTION, SOLUTION SUBCUTANEOUS
Refills: 0 | Status: DISCONTINUED | OUTPATIENT
Start: 2024-06-08 | End: 2024-06-08

## 2024-06-08 RX ADMIN — Medication 16 UNIT(S): at 09:14

## 2024-06-08 RX ADMIN — Medication 40 MILLIEQUIVALENT(S): at 13:44

## 2024-06-08 RX ADMIN — INSULIN GLARGINE 58 UNIT(S): 100 INJECTION, SOLUTION SUBCUTANEOUS at 13:45

## 2024-06-08 RX ADMIN — LOSARTAN POTASSIUM 25 MILLIGRAM(S): 100 TABLET, FILM COATED ORAL at 05:06

## 2024-06-08 RX ADMIN — Medication 650 MILLIGRAM(S): at 23:04

## 2024-06-08 RX ADMIN — HEPARIN SODIUM 5000 UNIT(S): 5000 INJECTION INTRAVENOUS; SUBCUTANEOUS at 05:06

## 2024-06-08 RX ADMIN — Medication 13 UNIT(S): at 12:17

## 2024-06-08 RX ADMIN — Medication 13 UNIT(S): at 17:35

## 2024-06-08 RX ADMIN — HEPARIN SODIUM 5000 UNIT(S): 5000 INJECTION INTRAVENOUS; SUBCUTANEOUS at 13:45

## 2024-06-08 RX ADMIN — CHLORHEXIDINE GLUCONATE 1 APPLICATION(S): 213 SOLUTION TOPICAL at 13:50

## 2024-06-08 RX ADMIN — ATORVASTATIN CALCIUM 10 MILLIGRAM(S): 80 TABLET, FILM COATED ORAL at 22:00

## 2024-06-08 RX ADMIN — Medication 6: at 09:13

## 2024-06-08 RX ADMIN — HEPARIN SODIUM 5000 UNIT(S): 5000 INJECTION INTRAVENOUS; SUBCUTANEOUS at 22:00

## 2024-06-08 RX ADMIN — Medication 40 MILLIEQUIVALENT(S): at 17:35

## 2024-06-08 NOTE — PROGRESS NOTE ADULT - SUBJECTIVE AND OBJECTIVE BOX
Lino Velazquez MD  Division of Hospitalist Medicine  Pager [phone]  Available on Teams    CC: Patient is a 39y old  Female who presents with a chief complaint of DKA (07 Jun 2024 15:42)        SUBJECTIVE / INTERVAL HPI: Patient seen and examined at bedside. Pt grossly denies any new complaints.     ROS: negative unless otherwise stated above.    VITAL SIGNS:  Vital Signs Last 24 Hrs  T(C): 36.9 (08 Jun 2024 09:54), Max: 37.2 (07 Jun 2024 22:22)  T(F): 98.5 (08 Jun 2024 09:54), Max: 98.9 (07 Jun 2024 22:22)  HR: 114 (08 Jun 2024 09:54) (79 - 118)  BP: 106/62 (08 Jun 2024 09:54) (106/62 - 155/80)  BP(mean): --  RR: 18 (08 Jun 2024 09:54) (18 - 18)  SpO2: 97% (08 Jun 2024 09:54) (96% - 100%)    Parameters below as of 08 Jun 2024 09:54  Patient On (Oxygen Delivery Method): room air            PHYSICAL EXAM:    General: NAD  HEENT: MMM  Neck: supple  Cardiovascular: +S1/S2; RRR  Respiratory: CTA B/L; no W/R/R  Gastrointestinal: soft, NT/ND  Extremities: WWP; no edema, clubbing or cyanosis  Neurological: awake and alert; communicating and able to follow commands without appreciated focal neurologic deficits    MEDICATIONS:  MEDICATIONS  (STANDING):  atorvastatin 10 milliGRAM(s) Oral at bedtime  chlorhexidine 2% Cloths 1 Application(s) Topical daily  dextrose 10% Bolus 125 milliLiter(s) IV Bolus once  dextrose 5%. 1000 milliLiter(s) (100 mL/Hr) IV Continuous <Continuous>  dextrose 5%. 1000 milliLiter(s) (50 mL/Hr) IV Continuous <Continuous>  dextrose 50% Injectable 25 Gram(s) IV Push once  dextrose 50% Injectable 12.5 Gram(s) IV Push once  glucagon  Injectable 1 milliGRAM(s) IntraMuscular once  heparin   Injectable 5000 Unit(s) SubCutaneous every 8 hours  insulin glargine Injectable (LANTUS) 58 Unit(s) SubCutaneous <User Schedule>  insulin lispro (ADMELOG) corrective regimen sliding scale   SubCutaneous three times a day before meals  insulin lispro (ADMELOG) corrective regimen sliding scale   SubCutaneous at bedtime  insulin lispro Injectable (ADMELOG) 13 Unit(s) SubCutaneous three times a day before meals  losartan 25 milliGRAM(s) Oral daily    MEDICATIONS  (PRN):  dextrose Oral Gel 15 Gram(s) Oral once PRN Blood Glucose LESS THAN 70 milliGRAM(s)/deciliter      ALLERGIES:  Allergies    No Known Allergies    Intolerances        LABS:                        8.7    15.03 )-----------( 212      ( 08 Jun 2024 07:52 )             28.4     06-08    142  |  102  |  16  ----------------------------<  261<H>  3.3<L>   |  25  |  1.18    Ca    8.8      08 Jun 2024 07:52  Phos  3.0     06-08  Mg     1.90     06-08    TPro  7.6  /  Alb  3.3  /  TBili  0.4  /  DBili  x   /  AST  25  /  ALT  17  /  AlkPhos  155<H>  06-07      Urinalysis Basic - ( 08 Jun 2024 07:52 )    Color: x / Appearance: x / SG: x / pH: x  Gluc: 261 mg/dL / Ketone: x  / Bili: x / Urobili: x   Blood: x / Protein: x / Nitrite: x   Leuk Esterase: x / RBC: x / WBC x   Sq Epi: x / Non Sq Epi: x / Bacteria: x      CAPILLARY BLOOD GLUCOSE      POCT Blood Glucose.: 140 mg/dL (08 Jun 2024 11:58)      RADIOLOGY & ADDITIONAL TESTS: Reviewed.

## 2024-06-08 NOTE — PROGRESS NOTE ADULT - ASSESSMENT
38 y/o F w/ PMHx of HTN who presented after found down at home unresponsive in bed with emesis on face, admitted to MICU for DKA. a1c 16.2%, started on insulin gtt. 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 Above goal this am: Stable at lunch. Advised pt not to eat in between meals  - S/p insulin gtt  - Increase Lantus to 58 units sq 2:30pm will move 2 hours daily until reaches bedtime (please do not hold as pt is at risk for DKA)  - Decrease Admelog to 13 units TID AC (please hold if npo/not eating): may need to be increased as pts glucose below goal at bedtime with 16 units but only because pt wasn't eating much of her meals  - Continue Admelog moderate correction scale tid ac and seperate Admelog moderate correction scale at bedtime   - FS before meals and at bedtime : please check 3 am FS   - Consistent carb diet   - RD consult  - Hypoglycemia Protocol   - given patient with acute DKA - should check Latent autoimmune diabetes of adults (KELLY) antibodies: zinc transporter antibody, glutamic acid decarboxylase antibody, IA-2 antibody (specimen received awaiting results); will check c-peptide in am --->  - Nutrition consult and diabetes/insulin pen teaching prior to discharge given this is NEW diabetes diagnosis, including glucometer teaching.  - Transition of care pharmacist following: please refer to pharmacotherapy note    - DISCHARGE RECOMMENDATIONS: basal-bolus regimen, doses TBD.     -Pt will need prescription for basal insulin pen (ie. Lantus Solostar pen, if not covered then ask pharmacy which brand of basal insulin is covered by their insurance plan - other brands include: Basaglar, Lantus, Tresiba, Toujeo) and bolus insulin pen (ie. Humalog Kwikpen , if not covered then ask pharmacy which brand of bolus insulin is covered by their insurance plan - other brands include: Novolog, Admelog). Dispense 5 insulin pens with 3 refills.     -Please send prescriptions for diabetes supplies (glucometer, test strips, lancets, alcohol swabs, insulin pen needles) - dispense #100, use as directed (3 refills).    - Please check for insurance coverage for [employer] vangie 3 sensors     - Please prescribe Glucose tablets 4G (take 4 tablets) or 15G tablets for blood sugar less than 70 mG/dL repeat fingerstick in 15 minutes. Patient and family will need instructions on proper use and to call 911 after use.     -Pt has [facility] medicaid please follow up on  July 10th at 2PM with Dr. Mensah at [facility] [address] [place], [place] [id number] [phone] for Endocrinology appointment; needs to bring referral and A1c    #HLD  - LDL 61  - goal LDL in T2DM is <70    #HTN  - goal BP in T2DM is <130/80  - please obtain urine micro albumin cr ratio as an outpt   - defer to primary team        Mervat Renee  Nurse Practitioner  Division of Endocrinology & Diabetes  In house pager #[phone]    If before 9AM or after 6PM, or on weekends/holidays, please call endocrine answering service for assistance ([phone]).For nonurgent matters email [email or web address] for assistance.

## 2024-06-08 NOTE — PROGRESS NOTE ADULT - SUBJECTIVE AND OBJECTIVE BOX
Chief Complaint: DKA; newly diagnosed DM    History: Pt seen at bedside. Pt tolerating oral diet.  Pt denies nausea and vomiting/any signs of hypoglycemia. Pt reports an adequate appetite.     MEDICATIONS  (STANDING):  atorvastatin 10 milliGRAM(s) Oral at bedtime  chlorhexidine 2% Cloths 1 Application(s) Topical daily  dextrose 10% Bolus 125 milliLiter(s) IV Bolus once  dextrose 5%. 1000 milliLiter(s) (50 mL/Hr) IV Continuous <Continuous>  dextrose 5%. 1000 milliLiter(s) (100 mL/Hr) IV Continuous <Continuous>  dextrose 50% Injectable 25 Gram(s) IV Push once  dextrose 50% Injectable 12.5 Gram(s) IV Push once  glucagon  Injectable 1 milliGRAM(s) IntraMuscular once  heparin   Injectable 5000 Unit(s) SubCutaneous every 8 hours  insulin glargine Injectable (LANTUS) 58 Unit(s) SubCutaneous <User Schedule>  insulin lispro (ADMELOG) corrective regimen sliding scale   SubCutaneous three times a day before meals  insulin lispro (ADMELOG) corrective regimen sliding scale   SubCutaneous at bedtime  insulin lispro Injectable (ADMELOG) 13 Unit(s) SubCutaneous three times a day before meals  losartan 25 milliGRAM(s) Oral daily  potassium chloride    Tablet ER 40 milliEquivalent(s) Oral every 4 hours    MEDICATIONS  (PRN):  dextrose Oral Gel 15 Gram(s) Oral once PRN Blood Glucose LESS THAN 70 milliGRAM(s)/deciliter      Allergies: No Known Allergies    Review of Systems:  Respiratory: No SOB, no cough  GI: No nausea, vomiting, abdominal pain  Endocrine: no polyuria, polydipsia    PHYSICAL EXAM:  VITALS: T(C): 36.9 (06-08-24 @ 13:21)  T(F): 98.5 (06-08-24 @ 13:21), Max: 98.9 (06-07-24 @ 22:22)  HR: 99 (06-08-24 @ 13:21) (79 - 114)  BP: 103/64 (06-08-24 @ 13:21) (103/64 - 155/80)  RR:  (18 - 18)  SpO2:  (96% - 100%)  Wt(kg): --  GENERAL: NAD, well-groomed, well-developed  RESPIRATORY: No labored breathing   GI: Soft, nontender, non distended  PSYCH: Alert and oriented x 3, normal affect, normal mood      CAPILLARY BLOOD GLUCOSE  POCT Blood Glucose.: 140 mg/dL (08 Jun 2024 11:58)  POCT Blood Glucose.: 290 mg/dL (08 Jun 2024 08:33)  POCT Blood Glucose.: 109 mg/dL (07 Jun 2024 23:03)  POCT Blood Glucose.: 77 mg/dL (07 Jun 2024 22:37)  POCT Blood Glucose.: 153 mg/dL (07 Jun 2024 17:29)    A1C with Estimated Average Glucose (06.04.24 @ 01:40)    A1C with Estimated Average Glucose Result: 16.2   Estimated Average Glucose: 418      06-08    142  |  102  |  16  ----------------------------<  261<H>  3.3<L>   |  25  |  1.18    eGFR: 60    Ca    8.8      06-08  Mg     1.90     06-08  Phos  3.0     06-08    TPro  7.6  /  Alb  3.3  /  TBili  0.4  /  DBili  x   /  AST  25  /  ALT  17  /  AlkPhos  155<H>  06-07      Diet, Consistent Carbohydrate/No Snacks (06-06-24 @ 12:17) [Active]

## 2024-06-09 LAB
ANION GAP SERPL CALC-SCNC: 13 MMOL/L — SIGNIFICANT CHANGE UP (ref 7–14)
BUN SERPL-MCNC: 15 MG/DL — SIGNIFICANT CHANGE UP (ref 7–23)
C PEPTIDE SERPL-MCNC: 0.6 NG/ML — LOW (ref 1.1–4.4)
CALCIUM SERPL-MCNC: 9.2 MG/DL — SIGNIFICANT CHANGE UP (ref 8.4–10.5)
CHLORIDE SERPL-SCNC: 107 MMOL/L — SIGNIFICANT CHANGE UP (ref 98–107)
CO2 SERPL-SCNC: 24 MMOL/L — SIGNIFICANT CHANGE UP (ref 22–31)
CREAT SERPL-MCNC: 1.28 MG/DL — SIGNIFICANT CHANGE UP (ref 0.5–1.3)
EGFR: 54 ML/MIN/1.73M2 — LOW
GLUCOSE BLDC GLUCOMTR-MCNC: 111 MG/DL — HIGH (ref 70–99)
GLUCOSE BLDC GLUCOMTR-MCNC: 146 MG/DL — HIGH (ref 70–99)
GLUCOSE BLDC GLUCOMTR-MCNC: 182 MG/DL — HIGH (ref 70–99)
GLUCOSE BLDC GLUCOMTR-MCNC: 222 MG/DL — HIGH (ref 70–99)
GLUCOSE BLDC GLUCOMTR-MCNC: 87 MG/DL — SIGNIFICANT CHANGE UP (ref 70–99)
GLUCOSE SERPL-MCNC: 102 MG/DL — HIGH (ref 70–99)
HCT VFR BLD CALC: 28.2 % — LOW (ref 34.5–45)
HGB BLD-MCNC: 8.7 G/DL — LOW (ref 11.5–15.5)
MAGNESIUM SERPL-MCNC: 1.9 MG/DL — SIGNIFICANT CHANGE UP (ref 1.6–2.6)
MCHC RBC-ENTMCNC: 23.9 PG — LOW (ref 27–34)
MCHC RBC-ENTMCNC: 30.9 GM/DL — LOW (ref 32–36)
MCV RBC AUTO: 77.5 FL — LOW (ref 80–100)
NRBC # BLD: 0 /100 WBCS — SIGNIFICANT CHANGE UP (ref 0–0)
NRBC # FLD: 0 K/UL — SIGNIFICANT CHANGE UP (ref 0–0)
PHOSPHATE SERPL-MCNC: 3.8 MG/DL — SIGNIFICANT CHANGE UP (ref 2.5–4.5)
PLATELET # BLD AUTO: 283 K/UL — SIGNIFICANT CHANGE UP (ref 150–400)
POTASSIUM SERPL-MCNC: 3.4 MMOL/L — LOW (ref 3.5–5.3)
POTASSIUM SERPL-SCNC: 3.4 MMOL/L — LOW (ref 3.5–5.3)
RBC # BLD: 3.64 M/UL — LOW (ref 3.8–5.2)
RBC # FLD: 16.9 % — HIGH (ref 10.3–14.5)
SODIUM SERPL-SCNC: 144 MMOL/L — SIGNIFICANT CHANGE UP (ref 135–145)
WBC # BLD: 12.73 K/UL — HIGH (ref 3.8–10.5)
WBC # FLD AUTO: 12.73 K/UL — HIGH (ref 3.8–10.5)

## 2024-06-09 PROCEDURE — 99232 SBSQ HOSP IP/OBS MODERATE 35: CPT

## 2024-06-09 PROCEDURE — 93010 ELECTROCARDIOGRAM REPORT: CPT

## 2024-06-09 RX ORDER — INSULIN GLARGINE 100 [IU]/ML
48 INJECTION, SOLUTION SUBCUTANEOUS
Refills: 0 | Status: DISCONTINUED | OUTPATIENT
Start: 2024-06-10 | End: 2024-06-10

## 2024-06-09 RX ORDER — INSULIN LISPRO 100/ML
10 VIAL (ML) SUBCUTANEOUS
Refills: 0 | Status: DISCONTINUED | OUTPATIENT
Start: 2024-06-09 | End: 2024-06-10

## 2024-06-09 RX ORDER — INSULIN GLARGINE 100 [IU]/ML
50 INJECTION, SOLUTION SUBCUTANEOUS
Refills: 0 | Status: DISCONTINUED | OUTPATIENT
Start: 2024-06-09 | End: 2024-06-09

## 2024-06-09 RX ORDER — INSULIN GLARGINE 100 [IU]/ML
48 INJECTION, SOLUTION SUBCUTANEOUS
Refills: 0 | Status: DISCONTINUED | OUTPATIENT
Start: 2024-06-09 | End: 2024-06-09

## 2024-06-09 RX ORDER — POTASSIUM CHLORIDE 20 MEQ
40 PACKET (EA) ORAL EVERY 4 HOURS
Refills: 0 | Status: COMPLETED | OUTPATIENT
Start: 2024-06-09 | End: 2024-06-09

## 2024-06-09 RX ADMIN — HEPARIN SODIUM 5000 UNIT(S): 5000 INJECTION INTRAVENOUS; SUBCUTANEOUS at 05:49

## 2024-06-09 RX ADMIN — HEPARIN SODIUM 5000 UNIT(S): 5000 INJECTION INTRAVENOUS; SUBCUTANEOUS at 13:12

## 2024-06-09 RX ADMIN — Medication 40 MILLIEQUIVALENT(S): at 17:38

## 2024-06-09 RX ADMIN — Medication 10 UNIT(S): at 13:13

## 2024-06-09 RX ADMIN — Medication 10 UNIT(S): at 17:48

## 2024-06-09 RX ADMIN — INSULIN GLARGINE 48 UNIT(S): 100 INJECTION, SOLUTION SUBCUTANEOUS at 17:55

## 2024-06-09 RX ADMIN — LOSARTAN POTASSIUM 25 MILLIGRAM(S): 100 TABLET, FILM COATED ORAL at 05:48

## 2024-06-09 RX ADMIN — Medication 40 MILLIEQUIVALENT(S): at 13:10

## 2024-06-09 RX ADMIN — CHLORHEXIDINE GLUCONATE 1 APPLICATION(S): 213 SOLUTION TOPICAL at 13:55

## 2024-06-09 RX ADMIN — Medication 650 MILLIGRAM(S): at 02:00

## 2024-06-09 RX ADMIN — ATORVASTATIN CALCIUM 10 MILLIGRAM(S): 80 TABLET, FILM COATED ORAL at 21:49

## 2024-06-09 RX ADMIN — Medication 13 UNIT(S): at 08:45

## 2024-06-09 RX ADMIN — Medication 2: at 17:48

## 2024-06-09 RX ADMIN — HEPARIN SODIUM 5000 UNIT(S): 5000 INJECTION INTRAVENOUS; SUBCUTANEOUS at 21:51

## 2024-06-09 NOTE — PROVIDER CONTACT NOTE (OTHER) - BACKGROUND
Pt admitted for dka
pt admitted for type II diabetes mellitus
pt admitted for type 2 diabetes mellitus
pt admitted for type II diabetes mellitus with ketoacidosis without coma

## 2024-06-09 NOTE — PROGRESS NOTE ADULT - SUBJECTIVE AND OBJECTIVE BOX
Chief Complaint: Newly diagnosed DM/DKA     History: Pt seen at bedside. Pt tolerating oral diet. Pt denies nausea and vomiting; pos hypoglycemia yesterday evening. Pt reports a fair appetite.     MEDICATIONS  (STANDING):  atorvastatin 10 milliGRAM(s) Oral at bedtime  chlorhexidine 2% Cloths 1 Application(s) Topical daily  dextrose 10% Bolus 125 milliLiter(s) IV Bolus once  dextrose 5%. 1000 milliLiter(s) (100 mL/Hr) IV Continuous <Continuous>  dextrose 5%. 1000 milliLiter(s) (50 mL/Hr) IV Continuous <Continuous>  dextrose 50% Injectable 25 Gram(s) IV Push once  dextrose 50% Injectable 12.5 Gram(s) IV Push once  glucagon  Injectable 1 milliGRAM(s) IntraMuscular once  heparin   Injectable 5000 Unit(s) SubCutaneous every 8 hours  insulin glargine Injectable (LANTUS) 48 Unit(s) SubCutaneous <User Schedule>  insulin lispro (ADMELOG) corrective regimen sliding scale   SubCutaneous three times a day before meals  insulin lispro (ADMELOG) corrective regimen sliding scale   SubCutaneous at bedtime  insulin lispro Injectable (ADMELOG) 10 Unit(s) SubCutaneous three times a day before meals  losartan 25 milliGRAM(s) Oral daily    MEDICATIONS  (PRN):  acetaminophen     Tablet .. 650 milliGRAM(s) Oral every 6 hours PRN Temp greater or equal to 38C (100.4F), Moderate Pain (4 - 6)  dextrose Oral Gel 15 Gram(s) Oral once PRN Blood Glucose LESS THAN 70 milliGRAM(s)/deciliter      Allergies: No Known Allergies      Review of Systems:  Respiratory: No SOB, no cough  GI: No nausea, vomiting, abdominal pain  Endocrine: no polyuria, polydipsia      PHYSICAL EXAM:  VITALS: T(C): 36.8 (06-09-24 @ 18:06)  T(F): 98.2 (06-09-24 @ 18:06), Max: 99.9 (06-08-24 @ 22:12)  HR: 110 (06-09-24 @ 18:06) (108 - 118)  BP: 124/85 (06-09-24 @ 18:06) (110/71 - 124/85)  RR:  (16 - 18)  SpO2:  (97% - 100%)  Wt(kg): --  GENERAL: NAD, well-groomed, well-developed  RESPIRATORY: No labored breathing   GI: Soft, nontender, non distended  PSYCH: Alert and oriented x 3, normal affect, normal mood      CAPILLARY BLOOD GLUCOSE  POCT Blood Glucose.: 182 mg/dL (09 Jun 2024 17:06)  POCT Blood Glucose.: 111 mg/dL (09 Jun 2024 12:25)  POCT Blood Glucose.: 146 mg/dL (09 Jun 2024 08:20)  POCT Blood Glucose.: 87 mg/dL (09 Jun 2024 02:59)  POCT Blood Glucose.: 77 mg/dL (08 Jun 2024 21:56)  POCT Blood Glucose.: 71 mg/dL (08 Jun 2024 21:54)  POCT Blood Glucose.: 68 mg/dL (08 Jun 2024 21:51)    A1C with Estimated Average Glucose (06.04.24 @ 01:40)    A1C with Estimated Average Glucose Result: 16.2   Estimated Average Glucose: 418    06-09    144  |  107  |  15  ----------------------------<  102<H>  3.4<L>   |  24  |  1.28    eGFR: 54<L>    Ca    9.2      06-09  Mg     1.90     06-09  Phos  3.8     06-09    TPro  7.6  /  Alb  3.3  /  TBili  0.4  /  DBili  x   /  AST  25  /  ALT  17  /  AlkPhos  155<H>  06-07    Diet, Consistent Carbohydrate/No Snacks (06-06-24 @ 12:17) [Active]

## 2024-06-09 NOTE — PROGRESS NOTE ADULT - ASSESSMENT
40 y/o F w/ PMHx of HTN who presented after found down at home unresponsive in bed with emesis on face, admitted to MICU for DKA. a1c 16.2%, started on insulin gtt. 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%)  Home meds: none  - Glucose 140-180mg/dl: hypoglycemia yesterday evening   - S/p insulin gtt  - Decrease Lantus to 48  units sq 6p changed to 8pm starting 6/10  will move 2 hours daily until reaches bedtime (please do not hold as pt is at risk for DKA): attempt to balance regimen further in am;  - Decrease Admelog to 10 units TID AC (please hold if npo/not eating); pt with fair appetite   - Continue Admelog moderate correction scale tid ac and seperate Admelog moderate correction scale at bedtime   - FS before meals and at bedtime : please check 3 am FS   - Consistent carb diet   - RD consult  - Hypoglycemia Protocol   - given patient with acute DKA - should check Latent autoimmune diabetes of adults (KELLY) antibodies: zinc transporter antibody (specimen received awaiting results), glutamic acid decarboxylase antibody 0.00 WNL, IA-2 antibody 0.00 WNL); will check c-peptide 0.6  with glucose 102 (pt is not producing endogenous insulin); may repeat again prior to discharge in case c-peptide falsely low due to recent DKA   - Nutrition consult and diabetes/insulin pen teaching prior to discharge given this is NEW diabetes diagnosis, including glucometer teaching.  - Transition of care pharmacist following: please refer to pharmacotherapy note    - DISCHARGE RECOMMENDATIONS: basal-bolus regimen, doses TBD.     -Pt will need prescription for basal insulin pen (ie. Lantus Solostar pen, if not covered then ask pharmacy which brand of basal insulin is covered by their insurance plan - other brands include: Basaglar, Lantus, Tresiba, Toujeo) and bolus insulin pen (ie. Humalog Kwikpen , if not covered then ask pharmacy which brand of bolus insulin is covered by their insurance plan - other brands include: Novolog, Admelog). Dispense 5 insulin pens with 3 refills.     -Please send prescriptions for diabetes supplies (glucometer, test strips, lancets, alcohol swabs, insulin pen needles) - dispense #100, use as directed (3 refills).    - Please check for insurance coverage for freestlye vangie 3 sensors     - Please prescribe Glucose tablets 4G (take 4 tablets) or 15G tablets for blood sugar less than 70 mG/dL repeat fingerstick in 15 minutes. Patient and family will need instructions on proper use and to call 911 after use.     -[facility] on  July 10th at 2PM with Dr. Mensah at [facility] [address] [place] [address]  [phone] for Endocrinology appointment; needs to bring referral and A1c    #HLD  - LDL 61  - goal LDL in T2DM is <70    #HTN  - goal BP in T2DM is <130/80  - please obtain urine micro albumin cr ratio as an outpt   - defer to primary team        Mervat Renee  Nurse Practitioner  Division of Endocrinology & Diabetes  In house pager #[phone]    If before 9AM or after 6PM, or on weekends/holidays, please call endocrine answering service for assistance ([phone]).For nonurgent matters email [email or web address] for assistance.    40 y/o F w/ PMHx of HTN who presented after found down at home unresponsive in bed with emesis on face, admitted to MICU for DKA. a1c 16.2%, started on insulin gtt. 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%)  Home meds: none  - Glucose 140-180mg/dl: hypoglycemia yesterday evening   - S/p insulin gtt  - Decrease Lantus to 48  units sq 6p changed to 8pm starting 6/10  will move 2 hours daily until reaches bedtime (please do not hold as pt is at risk for DKA): attempt to balance regimen further in am and decrease lantus if glucose allows   - Decrease Admelog to 10 units TID AC (please hold if npo/not eating); pt with fair appetite   - Continue Admelog moderate correction scale tid ac and seperate Admelog moderate correction scale at bedtime   - FS before meals and at bedtime : please check 3 am FS   - Consistent carb diet   - RD consult  - Hypoglycemia Protocol   - given patient with acute DKA - should check Latent autoimmune diabetes of adults (KELLY) antibodies: zinc transporter antibody (specimen received awaiting results), glutamic acid decarboxylase antibody 0.00 WNL, IA-2 antibody 0.00 WNL); will check c-peptide 0.6  with glucose 102 (pt is not producing endogenous insulin); may repeat again prior to discharge in case c-peptide falsely low due to recent DKA   - Nutrition consult and diabetes/insulin pen teaching prior to discharge given this is NEW diabetes diagnosis, including glucometer teaching.  - Transition of care pharmacist following: please refer to pharmacotherapy note    - DISCHARGE RECOMMENDATIONS: basal-bolus regimen, doses TBD.     -Pt will need prescription for basal insulin pen (ie. Lantus Solostar pen, if not covered then ask pharmacy which brand of basal insulin is covered by their insurance plan - other brands include: Basaglar, Lantus, Tresiba, Toujeo) and bolus insulin pen (ie. Humalog Kwikpen , if not covered then ask pharmacy which brand of bolus insulin is covered by their insurance plan - other brands include: Novolog, Admelog). Dispense 5 insulin pens with 3 refills.     -Please send prescriptions for diabetes supplies (glucometer, test strips, lancets, alcohol swabs, insulin pen needles) - dispense #100, use as directed (3 refills).    - Please check for insurance coverage for [employer] vangie 3 sensors     - Please prescribe Glucose tablets 4G (take 4 tablets) or 15G tablets for blood sugar less than 70 mG/dL repeat fingerstick in 15 minutes. Patient and family will need instructions on proper use and to call 911 after use.     -[facility] on  July 10th at 2PM with Dr. Mensah at [facility] [place] [address] [place], [place] [address] [phone] for Endocrinology appointment; needs to bring referral and A1c    #HLD  - LDL 61  - goal LDL in T2DM is <70    #HTN  - goal BP in T2DM is <130/80  - please obtain urine micro albumin cr ratio as an outpt   - defer to primary team        Mervat Renee  Nurse Practitioner  Division of Endocrinology & Diabetes  In house pager #[phone]    If before 9AM or after 6PM, or on weekends/holidays, please call endocrine answering service for assistance ([phone]).For nonurgent matters email [email or web address] for assistance.

## 2024-06-09 NOTE — PROVIDER CONTACT NOTE (OTHER) - ASSESSMENT
VSS.  Insulin drip running at 7 units per hour
Pt AxOx4. pt tachy to 115. pt afebrile
Pt AxOx4. pt tachycardic to 114. pt has been tachycardic since admission onto the unit
Pt AxOx4. pt tachy and rectal temp of 99.9. W/ HR of 118. Pt's FS was also 68 first, w/ recheck at 71 and 77

## 2024-06-09 NOTE — PROVIDER CONTACT NOTE (OTHER) - ACTION/TREATMENT ORDERED:
none
provider made aware. no new orders notified
complete EKG and teams it to the provider
will order tylenol, recheck 3am FS

## 2024-06-09 NOTE — PROVIDER CONTACT NOTE (OTHER) - SITUATION
pt tachy and rectal temp of 99.9
pt tachycardic
Finger stick 563 test via venous stick.  Realized iv fluid with dextrose was running below venous stick.  Repeat finger stick was 186.
pt tachy to 115

## 2024-06-09 NOTE — PROGRESS NOTE ADULT - TIME BILLING
Time-based billing (NON-critical care).     More than 50% of the visit was spent counseling and / or coordinating care by the attending physician.      The necessity of the time spent during the encounter on this date of service was due to: documentation in [place], reviewing chart and coordinating care with patient/ACPs and interdisciplinary staff (such as , social workers, etc) as well as reviewing vitals, laboratory data, radiology, medication list, consultants' recommendations and prior records. Interventions were performed as documented above.
Time-based billing (NON-critical care).     More than 50% of the visit was spent counseling and / or coordinating care by the attending physician.      The necessity of the time spent during the encounter on this date of service was due to: documentation in [place], reviewing chart and coordinating care with patient/ACPs and interdisciplinary staff (such as , social workers, etc) as well as reviewing vitals, laboratory data, radiology, medication list, consultants' recommendations and prior records. Interventions were performed as documented above.
Time-based billing (NON-critical care).     More than 50% of the visit was spent counseling and / or coordinating care by the attending physician.      The necessity of the time spent during the encounter on this date of service was due to: documentation in [place], reviewing chart and coordinating care with patient/ACPs and interdisciplinary staff (such as , social workers, etc) as well as reviewing vitals, laboratory data, radiology, medication list, consultants' recommendations and prior records. Interventions were performed as documented above.

## 2024-06-09 NOTE — PROGRESS NOTE ADULT - SUBJECTIVE AND OBJECTIVE BOX
Lino Velazquez MD  Division of Hospitalist Medicine  Pager [phone]  Available on Teams    CC: Patient is a 39y old  Female who presents with a chief complaint of DKA (08 Jun 2024 16:40)        SUBJECTIVE / INTERVAL HPI: Patient seen and examined at bedside. Pt grossly denies any new complaints.     ROS: negative unless otherwise stated above.    VITAL SIGNS:  Vital Signs Last 24 Hrs  T(C): 36.8 (09 Jun 2024 09:54), Max: 37.7 (08 Jun 2024 22:12)  T(F): 98.2 (09 Jun 2024 09:54), Max: 99.9 (08 Jun 2024 22:12)  HR: 112 (09 Jun 2024 09:54) (104 - 118)  BP: 118/86 (09 Jun 2024 09:54) (110/71 - 121/78)  BP(mean): 78 (08 Jun 2024 22:12) (78 - 78)  RR: 18 (09 Jun 2024 09:54) (18 - 18)  SpO2: 98% (09 Jun 2024 09:54) (97% - 100%)    Parameters below as of 09 Jun 2024 09:54  Patient On (Oxygen Delivery Method): room air            PHYSICAL EXAM:    General: NAD  HEENT: MMM  Neck: supple  Cardiovascular: +S1/S2; RRR  Respiratory: CTA B/L; no W/R/R  Gastrointestinal: soft, NT/ND  Extremities: WWP; no edema, clubbing or cyanosis  Neurological: awake and alert; communicating and able to follow commands without appreciated focal neurologic deficits    MEDICATIONS:  MEDICATIONS  (STANDING):  atorvastatin 10 milliGRAM(s) Oral at bedtime  chlorhexidine 2% Cloths 1 Application(s) Topical daily  dextrose 10% Bolus 125 milliLiter(s) IV Bolus once  dextrose 5%. 1000 milliLiter(s) (100 mL/Hr) IV Continuous <Continuous>  dextrose 5%. 1000 milliLiter(s) (50 mL/Hr) IV Continuous <Continuous>  dextrose 50% Injectable 25 Gram(s) IV Push once  dextrose 50% Injectable 12.5 Gram(s) IV Push once  glucagon  Injectable 1 milliGRAM(s) IntraMuscular once  heparin   Injectable 5000 Unit(s) SubCutaneous every 8 hours  insulin glargine Injectable (LANTUS) 48 Unit(s) SubCutaneous <User Schedule>  insulin lispro (ADMELOG) corrective regimen sliding scale   SubCutaneous three times a day before meals  insulin lispro (ADMELOG) corrective regimen sliding scale   SubCutaneous at bedtime  insulin lispro Injectable (ADMELOG) 10 Unit(s) SubCutaneous three times a day before meals  losartan 25 milliGRAM(s) Oral daily  potassium chloride    Tablet ER 40 milliEquivalent(s) Oral every 4 hours    MEDICATIONS  (PRN):  acetaminophen     Tablet .. 650 milliGRAM(s) Oral every 6 hours PRN Temp greater or equal to 38C (100.4F), Moderate Pain (4 - 6)  dextrose Oral Gel 15 Gram(s) Oral once PRN Blood Glucose LESS THAN 70 milliGRAM(s)/deciliter      ALLERGIES:  Allergies    No Known Allergies    Intolerances        LABS:                        8.7    12.73 )-----------( 283      ( 09 Jun 2024 05:50 )             28.2     06-09    144  |  107  |  15  ----------------------------<  102<H>  3.4<L>   |  24  |  1.28    Ca    9.2      09 Jun 2024 05:50  Phos  3.8     06-09  Mg     1.90     06-09        Urinalysis Basic - ( 09 Jun 2024 05:50 )    Color: x / Appearance: x / SG: x / pH: x  Gluc: 102 mg/dL / Ketone: x  / Bili: x / Urobili: x   Blood: x / Protein: x / Nitrite: x   Leuk Esterase: x / RBC: x / WBC x   Sq Epi: x / Non Sq Epi: x / Bacteria: x      CAPILLARY BLOOD GLUCOSE      POCT Blood Glucose.: 111 mg/dL (09 Jun 2024 12:25)      RADIOLOGY & ADDITIONAL TESTS: Reviewed.

## 2024-06-10 LAB
ANION GAP SERPL CALC-SCNC: 15 MMOL/L — HIGH (ref 7–14)
BUN SERPL-MCNC: 16 MG/DL — SIGNIFICANT CHANGE UP (ref 7–23)
CALCIUM SERPL-MCNC: 9 MG/DL — SIGNIFICANT CHANGE UP (ref 8.4–10.5)
CHLORIDE SERPL-SCNC: 106 MMOL/L — SIGNIFICANT CHANGE UP (ref 98–107)
CO2 SERPL-SCNC: 20 MMOL/L — LOW (ref 22–31)
CREAT SERPL-MCNC: 1.41 MG/DL — HIGH (ref 0.5–1.3)
EGFR: 48 ML/MIN/1.73M2 — LOW
GLUCOSE BLDC GLUCOMTR-MCNC: 201 MG/DL — HIGH (ref 70–99)
GLUCOSE BLDC GLUCOMTR-MCNC: 205 MG/DL — HIGH (ref 70–99)
GLUCOSE BLDC GLUCOMTR-MCNC: 223 MG/DL — HIGH (ref 70–99)
GLUCOSE BLDC GLUCOMTR-MCNC: 239 MG/DL — HIGH (ref 70–99)
GLUCOSE BLDC GLUCOMTR-MCNC: 253 MG/DL — HIGH (ref 70–99)
GLUCOSE SERPL-MCNC: 226 MG/DL — HIGH (ref 70–99)
HCT VFR BLD CALC: 27.3 % — LOW (ref 34.5–45)
HGB BLD-MCNC: 8.5 G/DL — LOW (ref 11.5–15.5)
MAGNESIUM SERPL-MCNC: 1.7 MG/DL — SIGNIFICANT CHANGE UP (ref 1.6–2.6)
MCHC RBC-ENTMCNC: 24.2 PG — LOW (ref 27–34)
MCHC RBC-ENTMCNC: 31.1 GM/DL — LOW (ref 32–36)
MCV RBC AUTO: 77.8 FL — LOW (ref 80–100)
NRBC # BLD: 0 /100 WBCS — SIGNIFICANT CHANGE UP (ref 0–0)
NRBC # FLD: 0 K/UL — SIGNIFICANT CHANGE UP (ref 0–0)
PHOSPHATE SERPL-MCNC: 3 MG/DL — SIGNIFICANT CHANGE UP (ref 2.5–4.5)
PLATELET # BLD AUTO: 347 K/UL — SIGNIFICANT CHANGE UP (ref 150–400)
POTASSIUM SERPL-MCNC: 4.3 MMOL/L — SIGNIFICANT CHANGE UP (ref 3.5–5.3)
POTASSIUM SERPL-SCNC: 4.3 MMOL/L — SIGNIFICANT CHANGE UP (ref 3.5–5.3)
RBC # BLD: 3.51 M/UL — LOW (ref 3.8–5.2)
RBC # FLD: 16.7 % — HIGH (ref 10.3–14.5)
SODIUM SERPL-SCNC: 141 MMOL/L — SIGNIFICANT CHANGE UP (ref 135–145)
WBC # BLD: 12.13 K/UL — HIGH (ref 3.8–10.5)
WBC # FLD AUTO: 12.13 K/UL — HIGH (ref 3.8–10.5)

## 2024-06-10 PROCEDURE — 99232 SBSQ HOSP IP/OBS MODERATE 35: CPT

## 2024-06-10 RX ORDER — ATORVASTATIN CALCIUM 80 MG/1
1 TABLET, FILM COATED ORAL
Qty: 30 | Refills: 0
Start: 2024-06-10 | End: 2024-07-09

## 2024-06-10 RX ORDER — INSULIN GLARGINE 100 [IU]/ML
52 INJECTION, SOLUTION SUBCUTANEOUS
Refills: 0 | Status: DISCONTINUED | OUTPATIENT
Start: 2024-06-10 | End: 2024-06-11

## 2024-06-10 RX ORDER — LOSARTAN POTASSIUM 100 MG/1
1 TABLET, FILM COATED ORAL
Qty: 30 | Refills: 0
Start: 2024-06-10 | End: 2024-07-09

## 2024-06-10 RX ORDER — INSULIN GLARGINE 100 [IU]/ML
52 INJECTION, SOLUTION SUBCUTANEOUS
Qty: 10 | Refills: 0
Start: 2024-06-10 | End: 2024-07-09

## 2024-06-10 RX ORDER — INSULIN LISPRO 100/ML
13 VIAL (ML) SUBCUTANEOUS
Refills: 0 | Status: DISCONTINUED | OUTPATIENT
Start: 2024-06-10 | End: 2024-06-11

## 2024-06-10 RX ORDER — INSULIN LISPRO 100/ML
13 VIAL (ML) SUBCUTANEOUS
Qty: 1 | Refills: 0
Start: 2024-06-10 | End: 2024-07-09

## 2024-06-10 RX ADMIN — Medication 6: at 08:59

## 2024-06-10 RX ADMIN — CHLORHEXIDINE GLUCONATE 1 APPLICATION(S): 213 SOLUTION TOPICAL at 12:33

## 2024-06-10 RX ADMIN — HEPARIN SODIUM 5000 UNIT(S): 5000 INJECTION INTRAVENOUS; SUBCUTANEOUS at 21:53

## 2024-06-10 RX ADMIN — HEPARIN SODIUM 5000 UNIT(S): 5000 INJECTION INTRAVENOUS; SUBCUTANEOUS at 05:31

## 2024-06-10 RX ADMIN — ATORVASTATIN CALCIUM 10 MILLIGRAM(S): 80 TABLET, FILM COATED ORAL at 21:53

## 2024-06-10 RX ADMIN — Medication 10 UNIT(S): at 12:30

## 2024-06-10 RX ADMIN — Medication 4: at 12:29

## 2024-06-10 RX ADMIN — INSULIN GLARGINE 52 UNIT(S): 100 INJECTION, SOLUTION SUBCUTANEOUS at 22:42

## 2024-06-10 RX ADMIN — Medication 4: at 17:52

## 2024-06-10 RX ADMIN — Medication 10 UNIT(S): at 08:59

## 2024-06-10 RX ADMIN — Medication 13 UNIT(S): at 17:52

## 2024-06-10 RX ADMIN — LOSARTAN POTASSIUM 25 MILLIGRAM(S): 100 TABLET, FILM COATED ORAL at 05:29

## 2024-06-10 NOTE — PROGRESS NOTE ADULT - PROBLEM SELECTOR PLAN 4
- not on any meds at home  - started on labetalol in MICU given OSMAN. will switch to losartan 25mg OD
- not on any meds at home  - started on labetalol in MICU given OSMAN. switched to losartan 25mg OD

## 2024-06-10 NOTE — PROGRESS NOTE ADULT - PROBLEM SELECTOR PLAN 3
- Admission Cr 4.57 on admission  - pre-renal in the setting of DKA, improved with fluids  - ctm Cr. baseline appears to be around 1 (CKD2)
resolved
- Admission Cr 4.57 on admission  - pre-renal in the setting of DKA, improved with fluids  - ctm Cr. baseline appears to be around 1 (CKD2)
- Admission Cr 4.57 on admission  - pre-renal in the setting of DKA, improved with fluids  - ctm Cr. baseline appears to be around 1 (CKD2)

## 2024-06-10 NOTE — PROGRESS NOTE ADULT - SUBJECTIVE AND OBJECTIVE BOX
Chief Complaint: DM type 2     Interval Events: FS above goal. Denies eating in between meals. No N/V or abdominal pain.     MEDICATIONS  (STANDING):  atorvastatin 10 milliGRAM(s) Oral at bedtime  chlorhexidine 2% Cloths 1 Application(s) Topical daily  dextrose 10% Bolus 125 milliLiter(s) IV Bolus once  dextrose 5%. 1000 milliLiter(s) (50 mL/Hr) IV Continuous <Continuous>  dextrose 5%. 1000 milliLiter(s) (100 mL/Hr) IV Continuous <Continuous>  dextrose 50% Injectable 25 Gram(s) IV Push once  dextrose 50% Injectable 12.5 Gram(s) IV Push once  glucagon  Injectable 1 milliGRAM(s) IntraMuscular once  heparin   Injectable 5000 Unit(s) SubCutaneous every 8 hours  insulin glargine Injectable (LANTUS) 52 Unit(s) SubCutaneous <User Schedule>  insulin lispro (ADMELOG) corrective regimen sliding scale   SubCutaneous three times a day before meals  insulin lispro (ADMELOG) corrective regimen sliding scale   SubCutaneous at bedtime  insulin lispro Injectable (ADMELOG) 13 Unit(s) SubCutaneous three times a day before meals  losartan 25 milliGRAM(s) Oral daily    MEDICATIONS  (PRN):  acetaminophen     Tablet .. 650 milliGRAM(s) Oral every 6 hours PRN Temp greater or equal to 38C (100.4F), Moderate Pain (4 - 6)  dextrose Oral Gel 15 Gram(s) Oral once PRN Blood Glucose LESS THAN 70 milliGRAM(s)/deciliter      Allergies  No Known Allergies    Review of Systems:  Constitutional: No fever/chills   Eyes: No blurry vision  Neuro: No tremors  HEENT: No pain  Cardiovascular: No chest pain, no palpitations  Respiratory: No SOB, no cough  GI: No nausea, vomiting or abdominal pain  : No dysuria  Skin: no rash  Endocrine: no polyuria, polydipsia      VITALS: T(C): 36.6 (06-10-24 @ 11:42)  T(F): 97.8 (06-10-24 @ 11:42), Max: 98.9 (06-09-24 @ 21:54)  HR: 100 (06-10-24 @ 11:42) (93 - 114)  BP: 114/66 (06-10-24 @ 11:42) (107/95 - 130/76)  RR:  (17 - 18)  SpO2:  (95% - 99%)      Physical Exam:   GENERAL: NAD, well-groomed, well-developed  EYES: No proptosis, no lid lag, anicteric  HEENT:  Atraumatic, Normocephalic, moist mucous membranes  RESPIRATORY: non labored breathing   GI: Soft, nontender, non distended  SKIN: Dry, intact, No rashes or lesions  MUSCULOSKELETAL: Full range of motion, normal strength  NEURO: extraocular movements intact, no tremor  PSYCH: Alert and oriented x 3, normal affect, normal mood    CAPILLARY BLOOD GLUCOSE  POCT Blood Glucose.: 239 mg/dL (10 Demian 2024 12:18)  POCT Blood Glucose.: 253 mg/dL (10 Demian 2024 08:33)  POCT Blood Glucose.: 201 mg/dL (10 Edmian 2024 03:51)  POCT Blood Glucose.: 222 mg/dL (09 Jun 2024 21:47)  POCT Blood Glucose.: 182 mg/dL (09 Jun 2024 17:06)      06-10    141  |  106  |  16  ----------------------------<  226<H>  4.3   |  20<L>  |  1.41<H>    eGFR: 48<L>    Ca    9.0      06-10  Mg     1.70     06-10  Phos  3.0     06-10        A1C with Estimated Average Glucose Result: 16.2 % (06-04-24 @ 01:40)

## 2024-06-10 NOTE — PROGRESS NOTE ADULT - ASSESSMENT
40 y/o F with PMHx of HTN who presented after found down at home unresponsive in bed with emesis on face, admitted to MICU for DKA. a1c 16.2%, started on insulin gtt. Reason for endocrine consult: DKA, blood glucose >1500 on admission, new diabetes.     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   - increase Lantus to 52 units daily at 8 pm. DO NOT HOLD IF NPO. --> will move 2 hours each day until bedtime reached.   - increase Admelog to 13 units TID AC. Hold if not eating/NPO.   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  - Transition of care pharmacist following: please refer to pharmacotherapy note    Discharge plan  - discharge on Lantus pen 52 units daily at 8 pm + Humalog 13 units TID AC (Hold Humalog if not eating)  - Please send prescriptions for diabetes supplies (glucometer, test strips, lancets, alcohol swabs, insulin pen needles) - dispense #100, use as directed (3 refills).  - please ensure nursing teach the patient how to use her own glucometer.   - Please prescribe Glucose tablets 4G (take 4 tablets) or 15G tablets for blood sugar less than 70 mG/dL repeat fingerstick in 15 minutes.   - patient has [employer] medicaid. Please follow up on  July 10th at 2PM with Dr. Mensah at [facility] [address] [place] [address] [phone] for Endocrinology appointment. She needs to bring referral and A1c.     HLD  - goal LDL in T2DM is <70  - LDL 61  - continue statin   - management per primary team       HTN  - goal BP in T2DM is <130/80  - continue losartan   - please obtain urine micro albumin cr ratio as an outpt   - management per primary team       TANYA Pandey-BC  Nurse Practitioner  Division of Endocrinology & Diabetes  pager [phone]    38 y/o F with PMHx of HTN who presented after found down at home unresponsive in bed with emesis on face, admitted to MICU for DKA. a1c 16.2%, started on insulin gtt. Reason for endocrine consult: DKA, blood glucose >1500 on admission, new diabetes.     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   - increase Lantus to 52 units daily at 8 pm. DO NOT HOLD IF NPO. --> will move 2 hours each day until bedtime reached.   - increase Admelog to 13 units TID AC. Hold if not eating/NPO.   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  - Transition of care pharmacist following: please refer to pharmacotherapy note    Discharge plan  - lantus and humalog pen covered by insurance, insulin pens already picked up from [employer]  - discharge on Lantus pen 52 units daily at 8 pm + Humalog pen 13 units TID AC (Hold Humalog if not eating). --> please confirm in AM if this is still the dose on discharge   - Please send prescriptions for diabetes supplies (glucometer, test strips, lancets, alcohol swabs, insulin pen needles) - dispense #100, use as directed (3 refills).  - CGM FLS2 sensors picked up from [employer] and at bedside, luke set up already ---> will place CGM on the day of discharge   - please ensure nursing teach the patient how to use her own glucometer.   - Please prescribe Glucose tablets 4G (take 4 tablets) or 15G tablets for blood sugar less than 70 mG/dL repeat fingerstick in 15 minutes.   - patient has [employer] medicaid. Please follow up on  July 10th at 2PM with Dr. Mensah at [facility] [address] [place], [place] [id number] [phone] for Endocrinology appointment. She needs to bring referral and A1c.     HLD  - goal LDL in T2DM is <70  - LDL 61  - continue statin   - management per primary team       HTN  - goal BP in T2DM is <130/80  - continue losartan   - please obtain urine micro albumin cr ratio as an outpt   - management per primary team       TANYA Pandey-BC  Nurse Practitioner  Division of Endocrinology & Diabetes  pager [phone]    40 y/o F with PMHx of HTN who presented after found down at home unresponsive in bed with emesis on face, admitted to MICU for DKA. a1c 16.2%, started on insulin gtt. Reason for endocrine consult: DKA, blood glucose >1500 on admission, new diabetes.     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   - increase Lantus to 52 units daily at 8 pm. DO NOT HOLD IF NPO. --> will move 2 hours each day until bedtime reached.   - increase Admelog to 13 units TID AC. Hold if not eating/NPO.   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  - Transition of care pharmacist following: please refer to pharmacotherapy note    Discharge plan  - lantus and humalog pen covered by insurance, insulin pens already picked up from [facility]  - discharge on Lantus pen --- units daily at 8 pm + Humalog pen --- units TID AC (Hold Humalog if not eating).   - Please send prescriptions for diabetes supplies (glucometer, test strips, lancets, alcohol swabs, insulin pen needles) - dispense #100, use as directed (3 refills).  - CGM FLS2 sensors picked up from [employer] and at bedside, luke set up already ---> will place CGM on the day of discharge   - please ensure nursing teach the patient how to use her own glucometer.   - Please prescribe Glucose tablets 4G (take 4 tablets) or 15G tablets for blood sugar less than 70 mG/dL repeat fingerstick in 15 minutes.   - [facility] Please follow up on  July 10th at 2PM with Dr. Mensah at [facility] [address] [place], [place] [id number] [phone] for Endocrinology appointment. She needs to bring referral and A1c.     HLD  - goal LDL in T2DM is <70  - LDL 61  - continue statin   - management per primary team       HTN  - goal BP in T2DM is <130/80  - continue losartan   - please obtain urine micro albumin cr ratio as an outpt   - management per primary team       TANYA Pandey-BC  Nurse Practitioner  Division of Endocrinology & Diabetes  pager [phone]

## 2024-06-10 NOTE — PROGRESS NOTE ADULT - PROBLEM SELECTOR PLAN 2
- resolved  - treat DM as above

## 2024-06-10 NOTE — PROGRESS NOTE ADULT - PROBLEM SELECTOR PLAN 1
New diagnosis  - a1c 16.2  - islet cell ab and YUSRA ab negative  - c-peptide low  Plan:  - endo recs appreciated  - f/u zinc transporter ab  - basal bolus as per endo recs  - ISS  - f/u nutrition recs  - LDL 61, cont lipitor 10mg
New diagnosis  - a1c 16.2  - islet cell ab and YUSRA ab negative  - c-peptide low  Plan:  - endo recs appreciated  - f/u zinc transporter ab  - basal bolus as per endo recs  - ISS  - f/u nutrition recs  - LDL 61, moderate intensity statin indicated given DM despite goal LDL. started lipitor 10mg
New diagnosis  - a1c 16.2  - islet cell ab and YUSRA ab negative  Plan:  - endo recs appreciated  - f/u zinc transporter ab  - basal bolus as per endo recs  - ISS  - f/u nutrition recs  - LDL 61, will start moderate intensity statin given DM despite goal LDL. started lipitor 10mg
New diagnosis  - a1c 16.2  Plan:  - endo recs appreciated  - f/u autoimmune w/u  - basal bolus as per endo recs  - ISS  - f/u nutrition recs  - LDL 61, will start moderate intensity statin given DM despite goal LDL. will start lipitor 10mg

## 2024-06-10 NOTE — PROGRESS NOTE ADULT - SUBJECTIVE AND OBJECTIVE BOX
[facility] Division of Hospital Medicine  Tia Huitron MD  Pager [phone]    Patient is a 39y old  Female who presents with a chief complaint of DKA       SUBJECTIVE / OVERNIGHT EVENTS: pt feeling well; denies abd pain, nausea; [place] PO; pt states she is nervous about the insulin at home but she will do it; the nurses are teaching her      MEDICATIONS  (STANDING):  atorvastatin 10 milliGRAM(s) Oral at bedtime  chlorhexidine 2% Cloths 1 Application(s) Topical daily  dextrose 10% Bolus 125 milliLiter(s) IV Bolus once  dextrose 5%. 1000 milliLiter(s) (50 mL/Hr) IV Continuous <Continuous>  dextrose 5%. 1000 milliLiter(s) (100 mL/Hr) IV Continuous <Continuous>  dextrose 50% Injectable 25 Gram(s) IV Push once  dextrose 50% Injectable 12.5 Gram(s) IV Push once  glucagon  Injectable 1 milliGRAM(s) IntraMuscular once  heparin   Injectable 5000 Unit(s) SubCutaneous every 8 hours  insulin glargine Injectable (LANTUS) 48 Unit(s) SubCutaneous <User Schedule>  insulin lispro (ADMELOG) corrective regimen sliding scale   SubCutaneous three times a day before meals  insulin lispro (ADMELOG) corrective regimen sliding scale   SubCutaneous at bedtime  insulin lispro Injectable (ADMELOG) 10 Unit(s) SubCutaneous three times a day before meals  losartan 25 milliGRAM(s) Oral daily    MEDICATIONS  (PRN):  acetaminophen     Tablet .. 650 milliGRAM(s) Oral every 6 hours PRN Temp greater or equal to 38C (100.4F), Moderate Pain (4 - 6)  dextrose Oral Gel 15 Gram(s) Oral once PRN Blood Glucose LESS THAN 70 milliGRAM(s)/deciliter      CAPILLARY BLOOD GLUCOSE  POCT Blood Glucose.: 239 mg/dL (10 Demian 2024 12:18)  POCT Blood Glucose.: 253 mg/dL (10 Demian 2024 08:33)  POCT Blood Glucose.: 201 mg/dL (10 Demian 2024 03:51)  POCT Blood Glucose.: 222 mg/dL (09 Jun 2024 21:47)  POCT Blood Glucose.: 182 mg/dL (09 Jun 2024 17:06)      PHYSICAL EXAM:  Vital Signs Last 24 Hrs  T(F): 97.8 (10 Demian 2024 11:42), Max: 98.9 (09 Jun 2024 21:54)  HR: 100 (10 Demian 2024 11:42) (93 - 114)  BP: 114/66 (10 Demian 2024 11:42) (107/95 - 130/76)  RR: 18 (10 Demian 2024 11:42) (16 - 18)  SpO2: 99% (10 Demian 2024 11:42) (95% - 99%)    Parameters below as of 10 Demian 2024 11:42  Patient On (Oxygen Delivery Method): room air        CONSTITUTIONAL: NAD, appears comfortable  EYES: PERRLA; conjunctiva and sclera clear  ENMT: Moist oral mucosa; normal dentition  RESPIRATORY: Normal respiratory effort; lungs are clear to auscultation bilaterally  CARDIOVASCULAR: Regular rate and rhythm; No lower extremity edema  ABDOMEN: Nontender to palpation, normoactive bowel sounds  MUSCULOSKELETAL:  no clubbing or cyanosis of digits; no joint swelling or tenderness to palpation  PSYCH: A+O to person, place, and time; affect appropriate  NEUROLOGY: CN 2-12 are intact and symmetric; no gross sensory deficits   SKIN: No rashes; no palpable lesions    LABS:                        8.5    12.13 )-----------( 347      ( 10 Demian 2024 05:40 )             27.3     06-10    141  |  106  |  16  ----------------------------<  226<H>  4.3   |  20<L>  |  1.41<H>    Ca    9.0      10 Demian 2024 05:40  Phos  3.0     06-10  Mg     1.70     06-10

## 2024-06-10 NOTE — PROGRESS NOTE ADULT - PROBLEM SELECTOR PLAN 7
- empirically treated with zosyn given hypoxic respiratory failure, sepsis, found to be covered in vomitus

## 2024-06-10 NOTE — PROGRESS NOTE ADULT - PROBLEM SELECTOR PLAN 6
- WBC 16.9, T 100.4,  on admission  - U/A negative, limited CXR w/o focal consolidation, RVP negative, Blood cultures NGTD, UCx neg   - s/p zosyn x 5 days  resolved, likely SIRS, no source found
- WBC 16.9, T 100.4,  on admission  - U/A negative, limited CXR w/o focal consolidation, RVP negative, Blood cultures NGTD, UCx neg   - s/p zosyn x 5 days

## 2024-06-11 VITALS
OXYGEN SATURATION: 100 % | DIASTOLIC BLOOD PRESSURE: 72 MMHG | SYSTOLIC BLOOD PRESSURE: 111 MMHG | HEART RATE: 118 BPM | RESPIRATION RATE: 18 BRPM | TEMPERATURE: 99 F

## 2024-06-11 LAB
ANION GAP SERPL CALC-SCNC: 16 MMOL/L — HIGH (ref 7–14)
BUN SERPL-MCNC: 24 MG/DL — HIGH (ref 7–23)
CALCIUM SERPL-MCNC: 8.9 MG/DL — SIGNIFICANT CHANGE UP (ref 8.4–10.5)
CHLORIDE SERPL-SCNC: 106 MMOL/L — SIGNIFICANT CHANGE UP (ref 98–107)
CO2 SERPL-SCNC: 19 MMOL/L — LOW (ref 22–31)
CREAT SERPL-MCNC: 1.48 MG/DL — HIGH (ref 0.5–1.3)
EGFR: 46 ML/MIN/1.73M2 — LOW
GLUCOSE BLDC GLUCOMTR-MCNC: 196 MG/DL — HIGH (ref 70–99)
GLUCOSE BLDC GLUCOMTR-MCNC: 227 MG/DL — HIGH (ref 70–99)
GLUCOSE SERPL-MCNC: 209 MG/DL — HIGH (ref 70–99)
HCT VFR BLD CALC: 26.5 % — LOW (ref 34.5–45)
HGB BLD-MCNC: 8.2 G/DL — LOW (ref 11.5–15.5)
MAGNESIUM SERPL-MCNC: 1.8 MG/DL — SIGNIFICANT CHANGE UP (ref 1.6–2.6)
MCHC RBC-ENTMCNC: 23.8 PG — LOW (ref 27–34)
MCHC RBC-ENTMCNC: 30.9 GM/DL — LOW (ref 32–36)
MCV RBC AUTO: 76.8 FL — LOW (ref 80–100)
NRBC # BLD: 0 /100 WBCS — SIGNIFICANT CHANGE UP (ref 0–0)
NRBC # FLD: 0 K/UL — SIGNIFICANT CHANGE UP (ref 0–0)
PHOSPHATE SERPL-MCNC: 3.9 MG/DL — SIGNIFICANT CHANGE UP (ref 2.5–4.5)
PLATELET # BLD AUTO: 375 K/UL — SIGNIFICANT CHANGE UP (ref 150–400)
POTASSIUM SERPL-MCNC: 3.9 MMOL/L — SIGNIFICANT CHANGE UP (ref 3.5–5.3)
POTASSIUM SERPL-SCNC: 3.9 MMOL/L — SIGNIFICANT CHANGE UP (ref 3.5–5.3)
RBC # BLD: 3.45 M/UL — LOW (ref 3.8–5.2)
RBC # FLD: 16.9 % — HIGH (ref 10.3–14.5)
SODIUM SERPL-SCNC: 141 MMOL/L — SIGNIFICANT CHANGE UP (ref 135–145)
WBC # BLD: 14.87 K/UL — HIGH (ref 3.8–10.5)
WBC # FLD AUTO: 14.87 K/UL — HIGH (ref 3.8–10.5)

## 2024-06-11 PROCEDURE — 99232 SBSQ HOSP IP/OBS MODERATE 35: CPT

## 2024-06-11 PROCEDURE — 99239 HOSP IP/OBS DSCHRG MGMT >30: CPT

## 2024-06-11 RX ORDER — INSULIN GLARGINE 100 [IU]/ML
56 INJECTION, SOLUTION SUBCUTANEOUS
Qty: 10 | Refills: 0
Start: 2024-06-11 | End: 2024-07-10

## 2024-06-11 RX ORDER — INSULIN GLARGINE 100 [IU]/ML
56 INJECTION, SOLUTION SUBCUTANEOUS
Refills: 0 | Status: DISCONTINUED | OUTPATIENT
Start: 2024-06-11 | End: 2024-06-11

## 2024-06-11 RX ORDER — INSULIN LISPRO 100/ML
15 VIAL (ML) SUBCUTANEOUS
Qty: 1 | Refills: 0
Start: 2024-06-11 | End: 2024-07-10

## 2024-06-11 RX ORDER — INSULIN LISPRO 100/ML
15 VIAL (ML) SUBCUTANEOUS
Refills: 0 | Status: DISCONTINUED | OUTPATIENT
Start: 2024-06-11 | End: 2024-06-11

## 2024-06-11 RX ADMIN — Medication 2: at 12:40

## 2024-06-11 RX ADMIN — Medication 13 UNIT(S): at 12:39

## 2024-06-11 RX ADMIN — Medication 13 UNIT(S): at 08:41

## 2024-06-11 RX ADMIN — CHLORHEXIDINE GLUCONATE 1 APPLICATION(S): 213 SOLUTION TOPICAL at 12:44

## 2024-06-11 RX ADMIN — HEPARIN SODIUM 5000 UNIT(S): 5000 INJECTION INTRAVENOUS; SUBCUTANEOUS at 07:01

## 2024-06-11 RX ADMIN — Medication 4: at 08:40

## 2024-06-11 RX ADMIN — LOSARTAN POTASSIUM 25 MILLIGRAM(S): 100 TABLET, FILM COATED ORAL at 07:01

## 2024-06-11 NOTE — PROGRESS NOTE ADULT - PROBLEM SELECTOR PROBLEM 4
HLD (hyperlipidemia)
HTN (hypertension)

## 2024-06-11 NOTE — DISCHARGE NOTE NURSING/CASE MANAGEMENT/SOCIAL WORK - PATIENT PORTAL LINK FT
You can access the FollowMyHealth Patient Portal offered by [facility] by registering at the following website: [email or web address]. By joining [employer]’s FollowMyHealth portal, you will also be able to view your health information using other applications (apps) compatible with our system.

## 2024-06-11 NOTE — PROGRESS NOTE ADULT - PROBLEM SELECTOR PROBLEM 2
DKA (diabetic ketoacidosis)
Type 1 diabetes
DKA (diabetic ketoacidosis)

## 2024-06-11 NOTE — PROGRESS NOTE ADULT - PROBLEM SELECTOR PROBLEM 3
OSMAN (acute kidney injury)
HTN (hypertension)
OSMAN (acute kidney injury)
HTN (hypertension)
OSMAN (acute kidney injury)
OSMAN (acute kidney injury)

## 2024-06-11 NOTE — PROGRESS NOTE ADULT - PROBLEM SELECTOR PROBLEM 1
DKA (diabetic ketoacidosis)
Type 2 diabetes mellitus
DKA (diabetic ketoacidosis)
DKA (diabetic ketoacidosis)
Type 2 diabetes mellitus

## 2024-06-11 NOTE — PROGRESS NOTE ADULT - PROVIDER SPECIALTY LIST ADULT
MICU
Endocrinology
MICU
MICU
Endocrinology
Hospitalist

## 2024-06-11 NOTE — PROGRESS NOTE ADULT - SUBJECTIVE AND OBJECTIVE BOX
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 Pt tolerating oral diet. Pt denies nausea and vomiting/any signs of hypoglycemia. Pt reports an adequate appetite.     MEDICATIONS  (STANDING):  atorvastatin 10 milliGRAM(s) Oral at bedtime  chlorhexidine 2% Cloths 1 Application(s) Topical daily  dextrose 10% Bolus 125 milliLiter(s) IV Bolus once  dextrose 5%. 1000 milliLiter(s) (100 mL/Hr) IV Continuous <Continuous>  dextrose 5%. 1000 milliLiter(s) (50 mL/Hr) IV Continuous <Continuous>  dextrose 50% Injectable 25 Gram(s) IV Push once  dextrose 50% Injectable 12.5 Gram(s) IV Push once  glucagon  Injectable 1 milliGRAM(s) IntraMuscular once  heparin   Injectable 5000 Unit(s) SubCutaneous every 8 hours  insulin glargine Injectable (LANTUS) 52 Unit(s) SubCutaneous <User Schedule>  insulin lispro (ADMELOG) corrective regimen sliding scale   SubCutaneous three times a day before meals  insulin lispro (ADMELOG) corrective regimen sliding scale   SubCutaneous at bedtime  insulin lispro Injectable (ADMELOG) 13 Unit(s) SubCutaneous three times a day before meals  losartan 25 milliGRAM(s) Oral daily    MEDICATIONS  (PRN):  acetaminophen     Tablet .. 650 milliGRAM(s) Oral every 6 hours PRN Temp greater or equal to 38C (100.4F), Moderate Pain (4 - 6)  dextrose Oral Gel 15 Gram(s) Oral once PRN Blood Glucose LESS THAN 70 milliGRAM(s)/deciliter      Allergies: No Known Allergies    Review of Systems:  Respiratory: No SOB, no cough  GI: No nausea, vomiting, abdominal pain  Endocrine: no polyuria, polydipsia      PHYSICAL EXAM:  VITALS: T(C): 37.4 (06-11-24 @ 10:00)  T(F): 99.3 (06-11-24 @ 10:00), Max: 99.3 (06-11-24 @ 10:00)  HR: 118 (06-11-24 @ 10:00) (106 - 120)  BP: 111/72 (06-11-24 @ 10:00) (111/72 - 130/77)  RR:  (16 - 18)  SpO2:  (96% - 100%)  Wt(kg): --  GENERAL: NAD, well-groomed, well-developed  RESPIRATORY: No labored breathing   GI: Soft, nontender, non distended  PSYCH: Alert and oriented x 3, normal affect, normal mood      CAPILLARY BLOOD GLUCOSE  POCT Blood Glucose.: 196 mg/dL (11 Jun 2024 12:14)  POCT Blood Glucose.: 227 mg/dL (11 Jun 2024 08:26)  POCT Blood Glucose.: 205 mg/dL (10 Demian 2024 22:16)  POCT Blood Glucose.: 223 mg/dL (10 Demian 2024 17:23)    A1C with Estimated Average Glucose (06.04.24 @ 01:40)    A1C with Estimated Average Glucose Result: 16.2   Estimated Average Glucose: 418      06-11    141  |  106  |  24<H>  ----------------------------<  209<H>  3.9   |  19<L>  |  1.48<H>    eGFR: 46<L>    Ca    8.9      06-11  Mg     1.80     06-11  Phos  3.9     06-11    Diet, Consistent Carbohydrate/No Snacks (06-06-24 @ 12:17) [Active]

## 2024-06-11 NOTE — PHARMACOTHERAPY INTERVENTION NOTE - COMMENTS
Patient and mother educated on A1c, insulin pen administration, hypoglycemia and treatment, healthy plate and exercise. Patient previously taught by diabetes pharmacist. Asked patient to demonstrate insulin administration technique using demo pen and skin pad. Patient has trouble seeing the numbers on the pen but was successfully able to dial to 17 units. On the first attempt, she pushed the button on the insulin pen too early but was able to do it correctly on the second attempt. Assisted patient in adding her new home medication list into the health luke on her phone so she will remember to take her medications. Also counseled on losartan and atorvastatin, including  indication, dose, administration times, treatment duration, side effectsand special instructions. Patient questions and concerns were answered and addressed. Patient demonstrated understanding. Informed patient that medication list may change prior to discharge. Patient provided with educational handout.      Farrah Fischer, PharmD, Clinical Pharmacy Specialist, [phone]

## 2024-06-11 NOTE — DISCHARGE NOTE NURSING/CASE MANAGEMENT/SOCIAL WORK - NSDCFUADDAPPT_GEN_ALL_CORE_FT
-follow up on  July 10th at 2PM with Dr. Mensah at [facility] [address] [place], [place] [id number] [phone] for Endocrinology appointment;   - needs to bring referral and A1c

## 2024-06-11 NOTE — DISCHARGE NOTE NURSING/CASE MANAGEMENT/SOCIAL WORK - NSDCPEFALRISK_GEN_ALL_CORE
For information on Fall & Injury Prevention, visit: [email or web address] OR  [email or web address] OR  https://www.cdc.gov/steadi/patient.html

## 2024-06-11 NOTE — PROGRESS NOTE ADULT - ASSESSMENT
38 y/o F with PMHx of HTN who presented after found down at home unresponsive in bed with emesis on face, admitted to MICU for DKA. a1c 16.2%, started on insulin gtt. Reason for endocrine consult: DKA, blood glucose >1500 on admission, new diabetes.     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   - increase Lantus to 56 units sq at bedtime  DO NOT HOLD IF NPO. --> will move 2 hours each day until bedtime reached.   - increase Admelog to 15 units TID AC. Hold if not eating/NPO.   - continue moderate Admelog correctional scale TID AC  - continue separate moderate Admelog correctional scale at HS   - FS TID AC & HS ---> q6 if NPO   - hypoglycemia protocol PRN   - consistent carbohydrate diet  - RD consult  - given acute DKA - check Latent autoimmune diabetes of adults (KELLY) antibodies: zinc transporter antibody (specimen received awaiting results), glutamic acid decarboxylase antibody 0.00 WNL, IA-2 antibody 0.00 WNL  - c-peptide 0.6  with glucose 102 (pt is not producing endogenous insulin); please repeat c-peptide as an outpt in case c-peptide falsely low due to recent DKA   - Nutrition consult and diabetes/insulin pen teaching prior to discharge given this is NEW diabetes diagnosis, including glucometer teaching.  - Transition of care pharmacist following: please refer to pharmacotherapy note: unit based pharmacist will see pt today for glucometer teach and insulin pen administration: please refer to pharmacotherapy note     Discharge plan  - Lantus solostar pen 54 units sq at bedtime (please do not hold as pt is at risk for DKA) and humalog kwik pen 17 units sq TID AC (please hold if npo/not eating) as per team Lantus and Humalog are covered by pts insurance   - Please send prescriptions for diabetes supplies (glucometer, test strips, lancets, alcohol swabs, insulin pen needles) - dispense #100, use as directed (3 refills).    - given acute DKA - check Latent autoimmune diabetes of adults (KELLY) antibodies: zinc transporter antibody (specimen received awaiting results), glutamic acid decarboxylase antibody 0.00 WNL, IA-2 antibody 0.00 WNL  - c-peptide 0.6  with glucose 102 (pt is not producing endogenous insulin); please repeat c-peptide as an outpt in case c-peptide falsely low due to recent DKA     - CGM FLS2 sensors picked up from [employer] and at bedside, luke set up already ---> placed by writer today in left arm today     - unit based pharmacist will see pt today for glucometer teach and insulin pen administration: please refer to pharmacotherapy note     - Please prescribe Glucose tablets 4G (take 4 tablets) or 15G tablets for blood sugar less than 70 mG/dL repeat fingerstick in 15 minutes.     - Please call your doctor if you fs is 70 or below and or 250 and above     - Reviewed importance of medication adherence,  glucose monitoring, and following a consistent carb diet     - Hypoglycemia and intervention     - Please follow up with your pcp, podiatry, endocrinology, and opthalmology as an outpt     - patient has [employer] medicaid. Please follow up on  July 10th at 2PM with Dr. Mensah at [facility] [address] [place], [place] [id number] [phone] for Endocrinology appointment. She needs to bring referral and A1c.     HLD  - goal LDL in T2DM is <70  - LDL 61  - continue statin if no contraindicaitons   - management per primary team       HTN  - goal BP in T2DM is <130/80  - continue losartan   - please obtain urine micro albumin cr ratio as an outpt   - management per primary team     D/w Eunice Renee  Nurse Practitioner  Division of Endocrinology & Diabetes  In house pager #[phone]    If before 9AM or after 6PM, or on weekends/holidays, please call endocrine answering service for assistance ([phone]).For nonurgent matters email [email or web address] for [email or web address].

## 2024-06-14 LAB — ZINC TRANSPORTER 8 AB, RESULT: <15 U/ML — SIGNIFICANT CHANGE UP

## 2024-07-15 NOTE — DISCHARGE NOTE NURSING/CASE MANAGEMENT/SOCIAL WORK - NSTRANSFERBELONGINGSDISPO_GEN_A_NUR
Continuity of Care Form    Patient Name: Suzy Flaherty   :  1945  MRN:  [id number]    Admit date:  2024  Discharge date:  2024    Code Status Order: Full Code   Advance Directives:     Admitting Physician:  Nathaniel Chacon MD  PCP: No primary care provider on file.    Discharging Nurse: Macy Zelaya RN  Discharging Hospital Unit/Room#: 3016/3016-01  Discharging Unit Phone Number: [phone]    Emergency Contact:   Extended Emergency Contact Information  Primary Emergency Contact: jad giron  Home Phone: [phone]  Mobile Phone: [phone]  Relation: Child  Preferred language: English   needed? No  Secondary Emergency Contact: moon grubbs  Home Phone: [phone]  Mobile Phone: [phone]  Relation: Child  Preferred language: English   needed? No    Past Surgical History:  No past surgical history on file.    Immunization History:     There is no immunization history on file for this patient.    Active Problems:  Patient Active Problem List   Diagnosis Code    Septic shock (HCC) A41.9, R65.21    Urinary tract infection without hematuria N39.0    Abdominal cramps R10.9    Acute encephalopathy G93.40       Isolation/Infection:   Isolation            Contact          Patient Infection Status       Infection Onset Added Last Indicated Last Indicated By Review Planned Expiration Resolved Resolved By    MRSA 24 MRSA DNA Probe, Nasal        Nasal- 2024            Nurse Assessment:  Last Vital Signs: BP (!) 153/89   Pulse 95   Temp 98.8 °F (37.1 °C) (Axillary)   Resp 16   Ht 1.62 m (5' 3.78\")   Wt 78.8 kg (173 lb 11.6 oz)   SpO2 95%   BMI 30.03 kg/m²     Last documented pain score (0-10 scale): Pain Level: 0  Last Weight:   Wt Readings from Last 1 Encounters:   24 78.8 kg (173 lb 11.6 oz)     Mental Status:  disoriented and alert    IV Access:  Right Cephalic PICC     Nursing Mobility/ADLs:  Walking   Assisted  Transfer  Assisted  Bathing   Assisted  Dressing  Assisted  Toileting  Assisted  Feeding  Assisted  Med Admin  Assisted  Med Delivery   whole    Wound Care Documentation and Therapy:        Elimination:  Continence:   Bowel: No  Bladder: No  Urinary Catheter: Insertion Date: 07/12/2024    Colostomy/Ileostomy/Ileal Conduit: No       Date of Last BM: 07/17/2024    Intake/Output Summary (Last 24 hours) at 7/15/2024 1235  Last data filed at 7/15/2024 1043  Gross per 24 hour   Intake 1092.86 ml   Output 2315 ml   Net -1222.14 ml     I/O last 3 completed shifts:  In: 2502.2 [P.O.:260; I.V.:1911.3; IV Piggyback:330.9]  Out: 3810 [Urine:3810]    Safety Concerns:     Sundowners [place]rome    Impairments/Disabilities:      None    Nutrition Therapy:  Current Nutrition Therapy:   - Oral Diet:  Dysphagia - Soft and Bite Sized    Routes of Feeding: Oral  Liquids: Thin Liquids  Daily Fluid Restriction: no  Last Modified Barium Swallow with Video (Video Swallowing Test): not done    Treatments at the Time of Hospital Discharge:   Respiratory Treatments: n/a  Oxygen Therapy:  is not on home oxygen therapy.  Ventilator:    - No ventilator support    Rehab Therapies: Physical Therapy and Occupational Therapy  Weight Bearing Status/Restrictions: No weight bearing restrictions  Other Medical Equipment (for information only, NOT a DME order):    Other Treatments:     Patient's personal belongings (please select all that are sent with patient):  [employer]    RN SIGNATURE:  Electronically signed by ABRIL KELLOGG RN on 7/17/24 at 12:30 PM EDT    CASE MANAGEMENT/SOCIAL WORK SECTION    Inpatient Status Date: 7/12/24    Readmission Risk Assessment Score:  Readmission Risk              Risk of Unplanned Readmission:  10           Discharging to Facility/ Agency   Name: [facility] with hospice that nursing facility contracts with  Address:  Phone:  Fax:    Dialysis Facility (if applicable)   Name:  Address:  Dialysis Schedule:  Phone:  Fax:    /Social  given to family

## 2024-10-11 NOTE — DISCHARGE NOTE PROVIDER - PROVIDER RX CONTACT NUMBER
Pt with history of Afib on Eliquis 5mg BID. Pt is not reporting hematuria or bloody stools, no other signs of acute bleeding    Plan:  - continue eliquis 5mg BID
[phone]